# Patient Record
Sex: FEMALE | Race: WHITE | Employment: OTHER | URBAN - METROPOLITAN AREA
[De-identification: names, ages, dates, MRNs, and addresses within clinical notes are randomized per-mention and may not be internally consistent; named-entity substitution may affect disease eponyms.]

---

## 2018-10-01 ENCOUNTER — HOSPITAL ENCOUNTER (EMERGENCY)
Age: 28
Discharge: HOME | End: 2018-10-02
Attending: EMERGENCY MEDICINE
Payer: MEDICAID

## 2018-10-01 VITALS
RESPIRATION RATE: 15 BRPM | OXYGEN SATURATION: 98 % | TEMPERATURE: 98.7 F | DIASTOLIC BLOOD PRESSURE: 81 MMHG | HEART RATE: 92 BPM | BODY MASS INDEX: 42.68 KG/M2 | HEIGHT: 64 IN | WEIGHT: 250 LBS | SYSTOLIC BLOOD PRESSURE: 127 MMHG

## 2018-10-01 DIAGNOSIS — R45.851 DEPRESSION WITH SUICIDAL IDEATION: Primary | ICD-10-CM

## 2018-10-01 DIAGNOSIS — F32.A DEPRESSION WITH SUICIDAL IDEATION: Primary | ICD-10-CM

## 2018-10-01 LAB
ACETAMINOPHEN LEVEL: <5 UG/ML (ref 15–30)
ALBUMIN SERPL-MCNC: 3.8 GM/DL (ref 3.4–5)
ALCOHOL SCREEN SERUM: <0.01 %WT/VOL
ALP BLD-CCNC: 79 IU/L (ref 40–129)
ALT SERPL-CCNC: 15 U/L (ref 10–40)
AMPHETAMINES: NEGATIVE
ANION GAP SERPL CALCULATED.3IONS-SCNC: 11 MMOL/L (ref 4–16)
AST SERPL-CCNC: 16 IU/L (ref 15–37)
BACTERIA: NEGATIVE /HPF
BARBITURATE SCREEN URINE: NEGATIVE
BASOPHILS ABSOLUTE: 0 K/CU MM
BASOPHILS RELATIVE PERCENT: 0.3 % (ref 0–1)
BENZODIAZEPINE SCREEN, URINE: NEGATIVE
BILIRUB SERPL-MCNC: 0.2 MG/DL (ref 0–1)
BILIRUBIN URINE: NEGATIVE MG/DL
BLOOD, URINE: NEGATIVE
BUN BLDV-MCNC: 14 MG/DL (ref 6–23)
CALCIUM SERPL-MCNC: 9.2 MG/DL (ref 8.3–10.6)
CANNABINOID SCREEN URINE: NEGATIVE
CHLORIDE BLD-SCNC: 103 MMOL/L (ref 99–110)
CLARITY: CLEAR
CO2: 26 MMOL/L (ref 21–32)
COCAINE METABOLITE: NEGATIVE
COLOR: YELLOW
CREAT SERPL-MCNC: 0.5 MG/DL (ref 0.6–1.1)
DIFFERENTIAL TYPE: ABNORMAL
EOSINOPHILS ABSOLUTE: 0.2 K/CU MM
EOSINOPHILS RELATIVE PERCENT: 2.4 % (ref 0–3)
GFR AFRICAN AMERICAN: >60 ML/MIN/1.73M2
GFR NON-AFRICAN AMERICAN: >60 ML/MIN/1.73M2
GLUCOSE BLD-MCNC: 92 MG/DL (ref 70–99)
GLUCOSE, URINE: NEGATIVE MG/DL
HCT VFR BLD CALC: 39.2 % (ref 37–47)
HEMOGLOBIN: 12 GM/DL (ref 12.5–16)
IMMATURE NEUTROPHIL %: 0.2 % (ref 0–0.43)
KETONES, URINE: NEGATIVE MG/DL
LEUKOCYTE ESTERASE, URINE: NEGATIVE
LYMPHOCYTES ABSOLUTE: 2 K/CU MM
LYMPHOCYTES RELATIVE PERCENT: 20.2 % (ref 24–44)
MCH RBC QN AUTO: 26.4 PG (ref 27–31)
MCHC RBC AUTO-ENTMCNC: 30.6 % (ref 32–36)
MCV RBC AUTO: 86.2 FL (ref 78–100)
MONOCYTES ABSOLUTE: 0.6 K/CU MM
MONOCYTES RELATIVE PERCENT: 6.1 % (ref 0–4)
MUCUS: NORMAL HPF
NITRITE URINE, QUANTITATIVE: NEGATIVE
NUCLEATED RBC %: 0 %
OPIATES, URINE: NEGATIVE
OXYCODONE: NEGATIVE
PDW BLD-RTO: 13.8 % (ref 11.7–14.9)
PH, URINE: 5
PHENCYCLIDINE, URINE: NEGATIVE
PLATELET # BLD: 349 K/CU MM (ref 140–440)
PMV BLD AUTO: 9 FL (ref 7.5–11.1)
POTASSIUM SERPL-SCNC: 4.1 MMOL/L (ref 3.5–5.1)
PREGNANCY, URINE: NEGATIVE
PROTEIN UA: NEGATIVE MG/DL
RBC # BLD: 4.55 M/CU MM (ref 4.2–5.4)
RBC URINE: <1 /HPF
SALICYLATE LEVEL: <0.3 MG/DL (ref 15–30)
SEGMENTED NEUTROPHILS ABSOLUTE COUNT: 7 K/CU MM
SEGMENTED NEUTROPHILS RELATIVE PERCENT: 70.8 % (ref 36–66)
SODIUM BLD-SCNC: 140 MMOL/L (ref 135–145)
SPECIFIC GRAVITY UA: 1.02
SPECIFIC GRAVITY, URINE: 1.02 (ref 1–1.03)
SQUAMOUS EPITHELIAL: 2 /HPF
TOTAL IMMATURE NEUTOROPHIL: 0.02 K/CU MM
TOTAL NUCLEATED RBC: 0 K/CU MM
TOTAL PROTEIN: 7.6 GM/DL (ref 6.4–8.2)
TRICHOMONAS: NORMAL /HPF
TSH HIGH SENSITIVITY: 1.1 UIU/ML (ref 0.27–4.2)
UROBILINOGEN, URINE: 1 MG/DL
WBC # BLD: 9.8 K/CU MM (ref 4–10.5)
WBC UA: 2 /HPF

## 2018-10-01 PROCEDURE — 80307 DRUG TEST PRSMV CHEM ANLYZR: CPT

## 2018-10-01 PROCEDURE — G0480 DRUG TEST DEF 1-7 CLASSES: HCPCS

## 2018-10-01 PROCEDURE — 80329 ANALGESICS NON-OPIOID 1 OR 2: CPT

## 2018-10-01 PROCEDURE — 81025 URINE PREGNANCY TEST: CPT

## 2018-10-01 PROCEDURE — 36415 COLL VENOUS BLD VENIPUNCTURE: CPT

## 2018-10-01 PROCEDURE — 80050 GENERAL HEALTH PANEL: CPT

## 2018-10-01 PROCEDURE — 81001 URINALYSIS AUTO W/SCOPE: CPT

## 2018-10-01 PROCEDURE — 80320 DRUG SCREEN QUANTALCOHOLS: CPT

## 2018-10-01 PROCEDURE — 99285 EMERGENCY DEPT VISIT HI MDM: CPT

## 2018-10-01 NOTE — ED PROVIDER NOTES
EMERGENCY DEPARTMENT H&P     Patient Name:  Jeffy Walker  :  1990  MRN:  [id number]  Date of Visit:  10/1/2018    Triage Chief Complaint:   Suicide Attempt    HPI:  Jeffy Walker is a 25 y.o. female who presents for complaints of suicidal ideations. Patient reports that she has a history of PTSD and has been depressed she feels her family has left her and cares about her. She is present with her caretaker today who reports that patient had plans to stab herself in the neck with a fork however was stopped from doing so by staff. She states she no longer feels suicidal at this time however feels depressed. She denies any actual self-harm today. She denies any physical complaints. She denies any substance abuse. Denies any other symptoms or history at this time. ROS:  ROS  Ten point ROS was performed and is negative except as stated in HPI above. Past Medical History:   Diagnosis Date    Back pain     Depression     Encephalitis     Meningitis     Paraplegia ([facility] [address]     Suicidal ideation      Past Surgical History:   Procedure Laterality Date    PRE-MALIGNANT / BENIGN SKIN LESION EXCISION       left shoulder     History reviewed. No pertinent family history. Social History     Social History    Marital status: Single     Spouse name: N/A    Number of children: N/A    Years of education: N/A     Occupational History    Not on file. Social History Main Topics    Smoking status: Never Smoker    Smokeless tobacco: Never Used    Alcohol use No    Drug use: No    Sexual activity: No     Other Topics Concern    Not on file     Social History Narrative    Lives in group home. Wheelchair-bound since meningitis at 13 yo     No current facility-administered medications for this encounter.       Current Outpatient Prescriptions   Medication Sig Dispense Refill    magnesium citrate solution Take 296 mLs by mouth once for 1 dose 296 mL 0    diclofenac sodium (VOLTAREN) 1 % GEL Apply 4 g topically 4 times daily for 10 days 1 Tube 0    nystatin (MYCOSTATIN) 897876 UNIT/GM cream Apply topically 2 times daily Apply topically 2 times daily.  PARoxetine (PAXIL) 40 MG tablet Take 40 mg by mouth every morning      linaclotide (LINZESS) 145 MCG capsule Take 145 mcg by mouth every morning (before breakfast)      clotrimazole-betamethasone (LOTRISONE) 1-0.05 % cream Apply topically 2 times daily Apply topically 2 times daily.  zaleplon (SONATA) 5 MG capsule Take 10 mg by mouth nightly       bisacodyl (DULCOLAX) 5 MG EC tablet Take 5 mg by mouth every 12 hours as needed for Constipation.  benzoyl peroxide-erythromycin (BENZAMYCIN) gel Apply  topically 2 times daily. Apply topically 2 times daily. Allergies   Allergen Reactions    Fish-Derived Products     Lemon Extract [Flavoring Agent]        Physical Exam:  ED TRIAGE VITALS  BP: 127/81, Temp: 98.7 °F (37.1 °C), Pulse: 92, Resp: 15, SpO2: 98 %    GENERAL: Awake and alert, appears stated age, no acute distress noted. HEENT: NC / AT. Oropharynx unremarkable. NECK: Trachea midline. No overt adenopathy or masses. No stab wounds or other signs of trauma noted. CARDIOVASCULAR: RRR. Normal s1/s2. No murmur. Peripheral pulses and perfusion intact. RESPIRATORY: Lungs clear to auscultation bilaterally. No respiratory distress or accessory muscle usage. SKIN: Warm. Dry. Intact. No obvious skin abnormalities noted. NEURO: The patient is awake, alert, interactive. Follows commands and answers questions appropriately. Speech is fluent with intact cognition. PSYCHIATRIC: Appropriate. Cooperative with exam. Denies SI / HI at this time. Good eye contact.      I have reviewed and interpreted all of the currently available lab results from this visit:  Results for orders placed or performed during the hospital encounter of 10/01/18   CBC Auto Differential   Result Value Ref Range    WBC 9.8 4.0 - 10.5 K/CU MM    RBC 4.55 4.2

## 2018-10-01 NOTE — ED NOTES
All personal belongings were given to the representative that is with the patient as per the patients request       Lauren Smith RN  10/01/18 [id number]

## 2018-10-01 NOTE — ED NOTES
Patient placed in the bed at this time and changed into a green gown as per policy.       Chris Gary RN  10/01/18 [id number]

## 2018-10-02 NOTE — ED NOTES
Pt resting quietly in bed watching TV. Respirations easy. Sitter present.       [place], RN  10/01/18 1647
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
## 2019-02-11 ENCOUNTER — HOSPITAL ENCOUNTER (OUTPATIENT)
Dept: WOUND CARE | Age: 29
Discharge: HOME | End: 2019-02-11
Payer: MEDICAID

## 2019-02-11 ENCOUNTER — HOSPITAL ENCOUNTER (EMERGENCY)
Age: 29
Discharge: HOME | End: 2019-02-11
Attending: EMERGENCY MEDICINE
Payer: MEDICAID

## 2019-02-11 VITALS
RESPIRATION RATE: 16 BRPM | SYSTOLIC BLOOD PRESSURE: 119 MMHG | BODY MASS INDEX: 42.91 KG/M2 | DIASTOLIC BLOOD PRESSURE: 79 MMHG | HEIGHT: 64 IN | HEART RATE: 80 BPM | TEMPERATURE: 98.2 F

## 2019-02-11 VITALS
HEART RATE: 95 BPM | DIASTOLIC BLOOD PRESSURE: 71 MMHG | SYSTOLIC BLOOD PRESSURE: 106 MMHG | WEIGHT: 250 LBS | HEIGHT: 64 IN | BODY MASS INDEX: 42.68 KG/M2 | RESPIRATION RATE: 16 BRPM | OXYGEN SATURATION: 98 % | TEMPERATURE: 98.4 F

## 2019-02-11 DIAGNOSIS — F69 BEHAVIOR PROBLEM, ADULT: Primary | ICD-10-CM

## 2019-02-11 DIAGNOSIS — L89.311: ICD-10-CM

## 2019-02-11 DIAGNOSIS — L98.411 NON-PRESSURE CHRONIC ULCER OF BUTTOCK, LIMITED TO BREAKDOWN OF SKIN (HCC): ICD-10-CM

## 2019-02-11 LAB
ACETAMINOPHEN LEVEL: <5 UG/ML (ref 15–30)
ALBUMIN SERPL-MCNC: 3.3 GM/DL (ref 3.4–5)
ALCOHOL SCREEN SERUM: <0.01 %WT/VOL
ALP BLD-CCNC: 79 IU/L (ref 40–129)
ALT SERPL-CCNC: 10 U/L (ref 10–40)
AMPHETAMINES: NEGATIVE
ANION GAP SERPL CALCULATED.3IONS-SCNC: 12 MMOL/L (ref 4–16)
AST SERPL-CCNC: 12 IU/L (ref 15–37)
BARBITURATE SCREEN URINE: NEGATIVE
BASOPHILS ABSOLUTE: 0 K/CU MM
BASOPHILS RELATIVE PERCENT: 0.3 % (ref 0–1)
BENZODIAZEPINE SCREEN, URINE: NEGATIVE
BILIRUB SERPL-MCNC: 0.1 MG/DL (ref 0–1)
BUN BLDV-MCNC: 11 MG/DL (ref 6–23)
CALCIUM SERPL-MCNC: 8.6 MG/DL (ref 8.3–10.6)
CANNABINOID SCREEN URINE: NEGATIVE
CHLORIDE BLD-SCNC: 104 MMOL/L (ref 99–110)
CO2: 21 MMOL/L (ref 21–32)
COCAINE METABOLITE: NEGATIVE
CREAT SERPL-MCNC: 0.4 MG/DL (ref 0.6–1.1)
DIFFERENTIAL TYPE: ABNORMAL
EOSINOPHILS ABSOLUTE: 0.3 K/CU MM
EOSINOPHILS RELATIVE PERCENT: 3 % (ref 0–3)
GFR AFRICAN AMERICAN: >60 ML/MIN/1.73M2
GFR NON-AFRICAN AMERICAN: >60 ML/MIN/1.73M2
GLUCOSE BLD-MCNC: 109 MG/DL (ref 70–99)
GLUCOSE BLD-MCNC: 137 MG/DL (ref 70–99)
HCG QUALITATIVE: NEGATIVE
HCT VFR BLD CALC: 34.7 % (ref 37–47)
HEMOGLOBIN: 10.8 GM/DL (ref 12.5–16)
IMMATURE NEUTROPHIL %: 0.4 % (ref 0–0.43)
LYMPHOCYTES ABSOLUTE: 2.2 K/CU MM
LYMPHOCYTES RELATIVE PERCENT: 20.5 % (ref 24–44)
MCH RBC QN AUTO: 26.6 PG (ref 27–31)
MCHC RBC AUTO-ENTMCNC: 31.1 % (ref 32–36)
MCV RBC AUTO: 85.5 FL (ref 78–100)
MONOCYTES ABSOLUTE: 0.6 K/CU MM
MONOCYTES RELATIVE PERCENT: 5.3 % (ref 0–4)
NUCLEATED RBC %: 0 %
OPIATES, URINE: NEGATIVE
OXYCODONE: NEGATIVE
PDW BLD-RTO: 13.7 % (ref 11.7–14.9)
PHENCYCLIDINE, URINE: NEGATIVE
PLATELET # BLD: 352 K/CU MM (ref 140–440)
PMV BLD AUTO: 8.9 FL (ref 7.5–11.1)
POTASSIUM SERPL-SCNC: 3.9 MMOL/L (ref 3.5–5.1)
RBC # BLD: 4.06 M/CU MM (ref 4.2–5.4)
SALICYLATE LEVEL: <0.3 MG/DL (ref 15–30)
SEGMENTED NEUTROPHILS ABSOLUTE COUNT: 7.4 K/CU MM
SEGMENTED NEUTROPHILS RELATIVE PERCENT: 70.5 % (ref 36–66)
SODIUM BLD-SCNC: 137 MMOL/L (ref 135–145)
TOTAL IMMATURE NEUTOROPHIL: 0.04 K/CU MM
TOTAL NUCLEATED RBC: 0 K/CU MM
TOTAL PROTEIN: 6.9 GM/DL (ref 6.4–8.2)
WBC # BLD: 10.5 K/CU MM (ref 4–10.5)

## 2019-02-11 PROCEDURE — 85025 COMPLETE CBC W/AUTO DIFF WBC: CPT

## 2019-02-11 PROCEDURE — 99284 EMERGENCY DEPT VISIT MOD MDM: CPT

## 2019-02-11 PROCEDURE — 82962 GLUCOSE BLOOD TEST: CPT

## 2019-02-11 PROCEDURE — 80053 COMPREHEN METABOLIC PANEL: CPT

## 2019-02-11 PROCEDURE — 80307 DRUG TEST PRSMV CHEM ANLYZR: CPT

## 2019-02-11 PROCEDURE — 84703 CHORIONIC GONADOTROPIN ASSAY: CPT

## 2019-02-11 PROCEDURE — 99213 OFFICE O/P EST LOW 20 MIN: CPT

## 2019-02-11 PROCEDURE — G0480 DRUG TEST DEF 1-7 CLASSES: HCPCS

## 2019-02-11 PROCEDURE — 36415 COLL VENOUS BLD VENIPUNCTURE: CPT

## 2019-02-11 RX ORDER — AMOXICILLIN 500 MG/1
500 CAPSULE ORAL 3 TIMES DAILY
COMMUNITY
End: 2019-08-15

## 2019-02-11 RX ORDER — CIPROFLOXACIN AND DEXAMETHASONE 3; 1 MG/ML; MG/ML
4 SUSPENSION/ DROPS AURICULAR (OTIC) 2 TIMES DAILY
COMMUNITY
End: 2019-08-15

## 2019-02-11 RX ORDER — IBUPROFEN 800 MG/1
800 TABLET ORAL EVERY 6 HOURS PRN
COMMUNITY
End: 2019-10-01 | Stop reason: SDUPTHER

## 2019-02-25 ENCOUNTER — HOSPITAL ENCOUNTER (OUTPATIENT)
Dept: WOUND CARE | Age: 29
Discharge: HOME | End: 2019-02-25

## 2019-02-25 ENCOUNTER — HOSPITAL ENCOUNTER (EMERGENCY)
Age: 29
Discharge: OTHER INSTITUTION | End: 2019-02-25
Attending: EMERGENCY MEDICINE
Payer: MEDICAID

## 2019-02-25 VITALS
HEART RATE: 104 BPM | OXYGEN SATURATION: 99 % | BODY MASS INDEX: 42.68 KG/M2 | DIASTOLIC BLOOD PRESSURE: 80 MMHG | HEIGHT: 64 IN | RESPIRATION RATE: 18 BRPM | TEMPERATURE: 98 F | WEIGHT: 250 LBS | SYSTOLIC BLOOD PRESSURE: 101 MMHG

## 2019-02-25 DIAGNOSIS — R46.89 BEHAVIORAL CHANGE: Primary | ICD-10-CM

## 2019-02-25 PROCEDURE — 99285 EMERGENCY DEPT VISIT HI MDM: CPT

## 2019-05-31 ENCOUNTER — APPOINTMENT (OUTPATIENT)
Dept: GENERAL RADIOLOGY | Age: 29
End: 2019-05-31
Payer: MEDICAID

## 2019-05-31 ENCOUNTER — HOSPITAL ENCOUNTER (EMERGENCY)
Age: 29
Discharge: HOME | End: 2019-05-31
Payer: MEDICAID

## 2019-05-31 VITALS
TEMPERATURE: 98.4 F | HEART RATE: 96 BPM | SYSTOLIC BLOOD PRESSURE: 91 MMHG | RESPIRATION RATE: 18 BRPM | WEIGHT: 230 LBS | DIASTOLIC BLOOD PRESSURE: 77 MMHG | BODY MASS INDEX: 39.27 KG/M2 | OXYGEN SATURATION: 94 % | HEIGHT: 64 IN

## 2019-05-31 DIAGNOSIS — S90.32XA CONTUSION OF LEFT FOOT, INITIAL ENCOUNTER: Primary | ICD-10-CM

## 2019-05-31 PROCEDURE — 99283 EMERGENCY DEPT VISIT LOW MDM: CPT

## 2019-05-31 PROCEDURE — 73630 X-RAY EXAM OF FOOT: CPT

## 2019-05-31 PROCEDURE — 73610 X-RAY EXAM OF ANKLE: CPT

## 2019-05-31 ASSESSMENT — PAIN DESCRIPTION - LOCATION: LOCATION: FOOT

## 2019-05-31 ASSESSMENT — PAIN DESCRIPTION - ORIENTATION: ORIENTATION: LEFT

## 2019-05-31 ASSESSMENT — PAIN DESCRIPTION - PAIN TYPE: TYPE: ACUTE PAIN

## 2019-05-31 NOTE — ED NOTES
Bed: ED-25  Expected date:   Expected time:   Means of arrival:   Comments:  25 F lt foot injury     Ebonie Hilliard RN  05/31/19 [id number]

## 2019-05-31 NOTE — ED PROVIDER NOTES
eMERGENCY dEPARTMENT eNCOUnter      PCP: Morris Hodgkins, APRN - CNP    CHIEF COMPLAINT    Chief Complaint   Patient presents with    Foot Injury         HPI    Va Pelletier is a 25 y.o. female who presents with pain localized in the Left foot. Onset is Tuesday, 3 days ago. The context was patient is paraplegic and is in a wheelchair. She was at work when her wheelchair-bound due to some scaffolding causing a 5 gallon paint started to fall down onto her foot. No other injuries. Patient having gradually increasing pain and therefore presents to the ED for further evaluation. REVIEW OF SYSTEMS    General: No fever or chills  Skin: No lacerations or puncture wounds  Musculoskeletal: No other joint pain    All other review of systems are negative  See HPI and nursing notes for additional information       PAST MEDICAL & SURGICAL HISTORY    Past Medical History:   Diagnosis Date    Back pain     Depression     Encephalitis     Meningitis     Paraplegia ([address]     Suicidal ideation      Past Surgical History:   Procedure Laterality Date    PRE-MALIGNANT / BENIGN SKIN LESION EXCISION      2015 left shoulder       CURRENT MEDICATIONS    Current Outpatient Rx   Medication Sig Dispense Refill    amoxicillin (AMOXIL) 500 MG capsule Take 500 mg by mouth 3 times daily      ciprofloxacin-dexamethasone (CIPRODEX) 0.3-0.1 % otic suspension 4 drops 2 times daily      ibuprofen (ADVIL;MOTRIN) 800 MG tablet Take 800 mg by mouth every 6 hours as needed for Pain      magnesium citrate solution Take 296 mLs by mouth once for 1 dose 296 mL 0    diclofenac sodium (VOLTAREN) 1 % GEL Apply 4 g topically 4 times daily for 10 days 1 Tube 0    nystatin (MYCOSTATIN) 073401 UNIT/GM cream Apply topically 2 times daily Apply topically 2 times daily.       PARoxetine (PAXIL) 40 MG tablet Take 40 mg by mouth every morning      linaclotide (LINZESS) 145 MCG capsule Take 145 mcg by mouth every morning (before breakfast)  clotrimazole-betamethasone (LOTRISONE) 1-0.05 % cream Apply topically 2 times daily Apply topically 2 times daily.  zaleplon (SONATA) 5 MG capsule Take 10 mg by mouth nightly       bisacodyl (DULCOLAX) 5 MG EC tablet Take 5 mg by mouth every 12 hours as needed for Constipation.  benzoyl peroxide-erythromycin (BENZAMYCIN) gel Apply  topically 2 times daily. Apply topically 2 times daily. ALLERGIES    Allergies   Allergen Reactions    Fish-Derived Products     Lemon Extract [Flavoring Agent]        SOCIAL & FAMILY HISTORY    Social History     Socioeconomic History    Marital status: Single     Spouse name: None    Number of children: None    Years of education: None    Highest education level: None   Occupational History    None   Social Needs    Financial resource strain: None    Food insecurity:     Worry: None     Inability: None    Transportation needs:     Medical: None     Non-medical: None   Tobacco Use    Smoking status: Never Smoker    Smokeless tobacco: Never Used   Substance and Sexual Activity    Alcohol use: No    Drug use: No    Sexual activity: Never   Lifestyle    Physical activity:     Days per week: None     Minutes per session: None    Stress: None   Relationships    Social connections:     Talks on phone: None     Gets together: None     Attends Christianity service: None     Active member of club or organization: None     Attends meetings of clubs or organizations: None     Relationship status: None    Intimate partner violence:     Fear of current or ex partner: None     Emotionally abused: None     Physically abused: None     Forced sexual activity: None   Other Topics Concern    None   Social History Narrative    Lives in group home. Wheelchair-bound since meningitis at 13 yo     History reviewed. No pertinent family history.       PHYSICAL EXAM    VITAL SIGNS: BP 91/77   Pulse 96   Temp 98.4 °F (36.9 °C) (Oral)   Resp 18   Ht 5' 4\" (1.626 m) Wt 230 lb (104.3 kg)   SpO2 94%   BMI 39.48 kg/m²   Constitutional:  Well developed, appears comfortable  HENT:  Atraumatic  Respiratory:  No retractions  Musculoskeletal:   Left Lower Extemity:  The Left foot demonstrates mild soft tissue swelling. There is tenderness over dorsal aspect. No palpable defects. Range of motion mildly limited - no obvious deficits. The ankle joint is stable. No swelling, discoloration, or tenderness to palpation of the proximal to distal lower leg bones, ankle & toes  Distal capillary refill, motor intact. Vascular:  DP pulse 2+, capillary refill intact. Integument:  No open wounds of the left ankle   Neurologic:  Awake alert, no slurred speech     RADIOLOGY   Left foot xrays:       XR FOOT LEFT (MIN 3 VIEWS) (Final result)   Result time 05/31/19 01:21:47   Final result by Lulú Alfonso MD (05/31/19 01:21:47)                Impression:    1. Severe osteopenia, which limits evaluation. 2. No convincing evidence of a displaced fracture of the left ankle or foot. 3. Diffuse soft tissue prominence. Narrative:    EXAMINATION:  3 XRAY VIEWS OF THE LEFT FOOT; 3 XRAY VIEWS OF THE LEFT ANKLE    5/31/2019 12:38 am    COMPARISON:  04/18/2016. HISTORY:  ORDERING SYSTEM PROVIDED HISTORY: injury  TECHNOLOGIST PROVIDED HISTORY:  Reason for exam:->injury  Ordering Physician Provided Reason for Exam: injury, pt states that an 80lb  can of paint fell on foot and ankle  Acuity: Acute  Type of Exam: Initial  Relevant Medical/Surgical History: Best possible images, pt is paraplegic    FINDINGS:  The osseous structures are severely demineralized, which limits evaluation. No evidence of an acutely displaced fracture of the left ankle or left foot.   There is accentuation of the normal plantar arch.  There is diffuse soft  tissue swelling.                      XR ANKLE LEFT (MIN 3 VIEWS) (Final result)   Result time 05/31/19 01:21:47   Final result by Lulú Alfonso MD (05/31/19 01:21:47)                Impression:    1. Severe osteopenia, which limits evaluation. 2. No convincing evidence of a displaced fracture of the left ankle or foot. 3. Diffuse soft tissue prominence. Narrative:    EXAMINATION:  3 XRAY VIEWS OF THE LEFT FOOT; 3 XRAY VIEWS OF THE LEFT ANKLE    5/31/2019 12:38 am    COMPARISON:  04/18/2016. HISTORY:  ORDERING SYSTEM PROVIDED HISTORY: injury  TECHNOLOGIST PROVIDED HISTORY:  Reason for exam:->injury  Ordering Physician Provided Reason for Exam: injury, pt states that an 80lb  can of paint fell on foot and ankle  Acuity: Acute  Type of Exam: Initial  Relevant Medical/Surgical History: Best possible images, pt is paraplegic    FINDINGS:  The osseous structures are severely demineralized, which limits evaluation. No evidence of an acutely displaced fracture of the left ankle or left foot. There is accentuation of the normal plantar arch.  There is diffuse soft  tissue swelling.                      ED COURSE & MEDICAL DECISION MAKING       Vital signs and nursing notes reviewed during ED course. I have independently evaluated this patient . Supervising MD present in the Emergency Department, available for consultation, throughout entirety of  patient care. All pertinent Lab data and radiographic results reviewed with patient at bedside. The patient and/or the family were informed of the results of any tests/labs/imaging, the treatment plan, and time was allotted to answer questions. Differential diagnosis includes but not limited to fracture, dislocation, vascular injury, neurologic injury. Clinical  IMPRESSION    1. Contusion of left foot, initial encounter       Recommend ice and elevation as much as possible. To followup with orthopedist if symptoms do not improve over the next 5-7days. Diagnosis and plan discussed in detail with patient who understands and agrees.   Patient agrees to return emergency department if symptoms worsen or any new symptoms develop. Comment: Please note this report has been produced using speech recognition software and may contain errors related to that system including errors in grammar, punctuation, and spelling, as well as words and phrases that may be inappropriate. If there are any questions or concerns please feel free to contact the dictating provider for clarification.           Marianne Cassidy PA-C  05/31/19 [id number]

## 2019-06-17 ENCOUNTER — HOSPITAL ENCOUNTER (OUTPATIENT)
Dept: OCCUPATIONAL THERAPY | Age: 29
Discharge: HOME | End: 2019-06-17

## 2019-06-17 NOTE — FLOWSHEET NOTE
Physical Therapy  Cancellation/No-show Note  Patient Name:  Oleg Rich  :  1990   Date:  2019  Cancelled visits to date: 1  No-shows to date: 0    For today's appointment patient:  []  Cancelled  []  Rescheduled appointment  []  No-show     Reason given by patient:  []  Patient ill  []  Conflicting appointment  []  No transportation    []  Conflict with work  []  No reason given  [x]  Other:     Comments:  Patient wants home health care  Electronically signed by:  Kelly Mcgill, 2019, 12:15 PM

## 2019-08-15 ENCOUNTER — OFFICE VISIT (OUTPATIENT)
Dept: FAMILY MEDICINE CLINIC | Age: 29
End: 2019-08-15
Payer: MEDICAID

## 2019-08-15 VITALS
HEART RATE: 76 BPM | HEIGHT: 64 IN | BODY MASS INDEX: 39.27 KG/M2 | WEIGHT: 230 LBS | DIASTOLIC BLOOD PRESSURE: 74 MMHG | SYSTOLIC BLOOD PRESSURE: 110 MMHG | OXYGEN SATURATION: 98 %

## 2019-08-15 DIAGNOSIS — F41.9 ANXIETY: ICD-10-CM

## 2019-08-15 DIAGNOSIS — Z76.89 ENCOUNTER TO ESTABLISH CARE: ICD-10-CM

## 2019-08-15 DIAGNOSIS — R53.83 FATIGUE, UNSPECIFIED TYPE: ICD-10-CM

## 2019-08-15 DIAGNOSIS — R15.9 INCONTINENCE OF FECES, UNSPECIFIED FECAL INCONTINENCE TYPE: Primary | ICD-10-CM

## 2019-08-15 PROCEDURE — 99202 OFFICE O/P NEW SF 15 MIN: CPT | Performed by: NURSE PRACTITIONER

## 2019-08-15 RX ORDER — LORATADINE 10 MG/1
10 TABLET ORAL DAILY
Qty: 30 TABLET | Refills: 0 | Status: SHIPPED | OUTPATIENT
Start: 2019-08-15 | End: 2019-08-16 | Stop reason: SDUPTHER

## 2019-08-15 RX ORDER — DIAPER,BRIEF,INFANT-TODD,DISP
EACH MISCELLANEOUS
Qty: 56 G | Refills: 0 | Status: SHIPPED | OUTPATIENT
Start: 2019-08-15 | End: 2019-08-16 | Stop reason: SDUPTHER

## 2019-08-15 NOTE — PROGRESS NOTES
Eyes: Negative. Respiratory: Negative for cough, chest tightness, shortness of breath and wheezing. Cardiovascular: Negative for chest pain and palpitations. Gastrointestinal: Negative. Negative for nausea. Endocrine: Negative. Genitourinary: Negative. Musculoskeletal: Positive for gait problem (paraplegic). Negative for arthralgias, back pain, joint swelling, myalgias, neck pain and neck stiffness. Neurological: Negative for dizziness, seizures and headaches. Hematological: Negative. Psychiatric/Behavioral: Negative. OBJECTIVE:     /74   Pulse 76   Ht 5' 4\" (1.626 m)   Wt 230 lb (104.3 kg)   SpO2 98%   BMI 39.48 kg/m²     Physical Exam   Constitutional: She is oriented to person, place, and time. She appears well-developed and well-nourished. No distress. HENT:   Head: Normocephalic and atraumatic. Right Ear: External ear normal.   Left Ear: External ear normal.   Nose: Nose normal.   Mouth/Throat: Oropharynx is clear and moist. No oropharyngeal exudate. Eyes: Pupils are equal, round, and reactive to light. Conjunctivae and EOM are normal. Right eye exhibits no discharge. Left eye exhibits no discharge. No scleral icterus. Neck: Normal range of motion. Neck supple. Cardiovascular: Normal rate, regular rhythm and normal heart sounds. Pulses:       Dorsalis pedis pulses are 2+ on the right side, and 2+ on the left side. Pulmonary/Chest: Effort normal and breath sounds normal. No respiratory distress. She has no wheezes. She exhibits no tenderness. Abdominal: Soft. Musculoskeletal: Normal range of motion. Feet:   Right Foot:   Skin Integrity: Negative for ulcer or skin breakdown. Left Foot:   Skin Integrity: Negative for ulcer or skin breakdown. Lymphadenopathy:     She has no cervical adenopathy. Neurological: She is alert and oriented to person, place, and time. Skin: Skin is warm and dry. She is not diaphoretic.    Psychiatric: She has a normal

## 2019-08-16 DIAGNOSIS — R15.9 INCONTINENCE OF FECES, UNSPECIFIED FECAL INCONTINENCE TYPE: ICD-10-CM

## 2019-08-16 RX ORDER — LORATADINE 10 MG/1
10 TABLET ORAL DAILY
Qty: 30 TABLET | Refills: 0 | Status: SHIPPED | OUTPATIENT
Start: 2019-08-16 | End: 2019-09-15

## 2019-08-16 RX ORDER — DIAPER,BRIEF,INFANT-TODD,DISP
EACH MISCELLANEOUS
Qty: 56 G | Refills: 0 | Status: SHIPPED | OUTPATIENT
Start: 2019-08-16 | End: 2019-08-29 | Stop reason: ALTCHOICE

## 2019-08-16 ASSESSMENT — ENCOUNTER SYMPTOMS
BACK PAIN: 0
CHEST TIGHTNESS: 0
SINUS PRESSURE: 0
GASTROINTESTINAL NEGATIVE: 1
SORE THROAT: 1
SHORTNESS OF BREATH: 0
SINUS PAIN: 0
TROUBLE SWALLOWING: 0
EYES NEGATIVE: 1
WHEEZING: 0
COUGH: 0
NAUSEA: 0

## 2019-08-24 ENCOUNTER — HOSPITAL ENCOUNTER (EMERGENCY)
Age: 29
Discharge: HOME | End: 2019-08-24
Payer: MEDICAID

## 2019-08-24 ENCOUNTER — APPOINTMENT (OUTPATIENT)
Dept: GENERAL RADIOLOGY | Age: 29
End: 2019-08-24
Payer: MEDICAID

## 2019-08-24 VITALS
BODY MASS INDEX: 39.27 KG/M2 | OXYGEN SATURATION: 99 % | DIASTOLIC BLOOD PRESSURE: 82 MMHG | TEMPERATURE: 98.6 F | HEIGHT: 64 IN | WEIGHT: 230 LBS | SYSTOLIC BLOOD PRESSURE: 115 MMHG | HEART RATE: 97 BPM | RESPIRATION RATE: 15 BRPM

## 2019-08-24 DIAGNOSIS — Z86.59 HISTORY OF ANXIETY: Primary | ICD-10-CM

## 2019-08-24 LAB
ALBUMIN SERPL-MCNC: 3.6 GM/DL (ref 3.4–5)
ALP BLD-CCNC: 84 IU/L (ref 40–129)
ALT SERPL-CCNC: 15 U/L (ref 10–40)
ANION GAP SERPL CALCULATED.3IONS-SCNC: 10 MMOL/L (ref 4–16)
AST SERPL-CCNC: 20 IU/L (ref 15–37)
BASOPHILS ABSOLUTE: 0 K/CU MM
BASOPHILS RELATIVE PERCENT: 0.4 % (ref 0–1)
BILIRUB SERPL-MCNC: 0.2 MG/DL (ref 0–1)
BUN BLDV-MCNC: 12 MG/DL (ref 6–23)
CALCIUM SERPL-MCNC: 8.9 MG/DL (ref 8.3–10.6)
CHLORIDE BLD-SCNC: 105 MMOL/L (ref 99–110)
CO2: 25 MMOL/L (ref 21–32)
CREAT SERPL-MCNC: 0.5 MG/DL (ref 0.6–1.1)
DIFFERENTIAL TYPE: ABNORMAL
EOSINOPHILS ABSOLUTE: 0.3 K/CU MM
EOSINOPHILS RELATIVE PERCENT: 2.9 % (ref 0–3)
GFR AFRICAN AMERICAN: >60 ML/MIN/1.73M2
GFR NON-AFRICAN AMERICAN: >60 ML/MIN/1.73M2
GLUCOSE BLD-MCNC: 105 MG/DL (ref 70–99)
HCT VFR BLD CALC: 38.3 % (ref 37–47)
HEMOGLOBIN: 10.9 GM/DL (ref 12.5–16)
IMMATURE NEUTROPHIL %: 0.6 % (ref 0–0.43)
LYMPHOCYTES ABSOLUTE: 2.1 K/CU MM
LYMPHOCYTES RELATIVE PERCENT: 18.9 % (ref 24–44)
MCH RBC QN AUTO: 24.4 PG (ref 27–31)
MCHC RBC AUTO-ENTMCNC: 28.5 % (ref 32–36)
MCV RBC AUTO: 85.9 FL (ref 78–100)
MONOCYTES ABSOLUTE: 0.6 K/CU MM
MONOCYTES RELATIVE PERCENT: 5.2 % (ref 0–4)
NUCLEATED RBC %: 0 %
PDW BLD-RTO: 14.6 % (ref 11.7–14.9)
PLATELET # BLD: 403 K/CU MM (ref 140–440)
PMV BLD AUTO: 9.1 FL (ref 7.5–11.1)
POTASSIUM SERPL-SCNC: 4.3 MMOL/L (ref 3.5–5.1)
RBC # BLD: 4.46 M/CU MM (ref 4.2–5.4)
SEGMENTED NEUTROPHILS ABSOLUTE COUNT: 7.9 K/CU MM
SEGMENTED NEUTROPHILS RELATIVE PERCENT: 72 % (ref 36–66)
SODIUM BLD-SCNC: 140 MMOL/L (ref 135–145)
TOTAL IMMATURE NEUTOROPHIL: 0.06 K/CU MM
TOTAL NUCLEATED RBC: 0 K/CU MM
TOTAL PROTEIN: 7.5 GM/DL (ref 6.4–8.2)
WBC # BLD: 10.9 K/CU MM (ref 4–10.5)

## 2019-08-24 PROCEDURE — 36415 COLL VENOUS BLD VENIPUNCTURE: CPT

## 2019-08-24 PROCEDURE — 80053 COMPREHEN METABOLIC PANEL: CPT

## 2019-08-24 PROCEDURE — 71046 X-RAY EXAM CHEST 2 VIEWS: CPT

## 2019-08-24 PROCEDURE — 85025 COMPLETE CBC W/AUTO DIFF WBC: CPT

## 2019-08-24 PROCEDURE — 99284 EMERGENCY DEPT VISIT MOD MDM: CPT

## 2019-08-24 RX ORDER — HYDROXYZINE PAMOATE 25 MG/1
25-50 CAPSULE ORAL 3 TIMES DAILY PRN
Qty: 30 CAPSULE | Refills: 0 | Status: SHIPPED | OUTPATIENT
Start: 2019-08-24 | End: 2019-09-07

## 2019-08-24 RX ORDER — HYDROXYZINE HYDROCHLORIDE 50 MG/ML
50 INJECTION, SOLUTION INTRAMUSCULAR ONCE
Status: DISCONTINUED | OUTPATIENT
Start: 2019-08-24 | End: 2019-08-24 | Stop reason: HOSPADM

## 2019-08-24 ASSESSMENT — ENCOUNTER SYMPTOMS
CHEST TIGHTNESS: 0
DIARRHEA: 0
SHORTNESS OF BREATH: 0
VOMITING: 0
ABDOMINAL PAIN: 0
NAUSEA: 0

## 2019-08-24 ASSESSMENT — PAIN SCALES - GENERAL: PAINLEVEL_OUTOF10: 3

## 2019-08-24 ASSESSMENT — PAIN DESCRIPTION - LOCATION: LOCATION: CHEST

## 2019-08-24 ASSESSMENT — PAIN DESCRIPTION - DESCRIPTORS: DESCRIPTORS: PATIENT UNABLE TO DESCRIBE

## 2019-08-24 ASSESSMENT — PAIN DESCRIPTION - PAIN TYPE: TYPE: ACUTE PAIN

## 2019-08-24 NOTE — ED NOTES
Bed: ED-22  Expected date:   Expected time:   Means of arrival:   Comments:  Medic panic attack     Elizabeth Wright RN  08/24/19 [id number]

## 2019-08-24 NOTE — ED PROVIDER NOTES
session: None    Stress: None   Relationships    Social connections:     Talks on phone: None     Gets together: None     Attends Quaker service: None     Active member of club or organization: None     Attends meetings of clubs or organizations: None     Relationship status: None    Intimate partner violence:     Fear of current or ex partner: None     Emotionally abused: None     Physically abused: None     Forced sexual activity: None   Other Topics Concern    None   Social History Narrative    Lives in group home. Wheelchair-bound since meningitis at 13 yo       SCREENINGS             PHYSICAL EXAM    (up to 7 for level 4, 8 or more for level 5)     ED Triage Vitals   BP Temp Temp Source Pulse Resp SpO2 Height Weight   08/24/19 1222 08/24/19 1222 08/24/19 1222 08/24/19 1222 08/24/19 1222 08/24/19 1222 08/24/19 1220 08/24/19 1220   112/62 98.6 °F (37 °C) Oral 99 16 93 % 5' 4\" (1.626 m) 230 lb (104.3 kg)       Physical Exam   Constitutional: She is oriented to person, place, and time. She appears well-developed and well-nourished. No distress. HENT:   Head: Normocephalic and atraumatic. Right Ear: External ear normal.   Left Ear: External ear normal.   Eyes: Right eye exhibits no discharge. Left eye exhibits no discharge. Neck: Normal range of motion. Neck supple. No JVD present. Cardiovascular: Normal rate and regular rhythm. Exam reveals no friction rub. No murmur heard. Pulmonary/Chest: Effort normal and breath sounds normal. No stridor. No respiratory distress. She has no wheezes. Abdominal: Soft. She exhibits no mass. There is no tenderness. Musculoskeletal: Normal range of motion. Neurological: She is alert and oriented to person, place, and time. No cranial nerve deficit. GCS eye subscore is 4. GCS verbal subscore is 5. GCS motor subscore is 6. Skin: Skin is warm and dry. She is not diaphoretic. No pallor. Psychiatric: She has a normal mood and affect.  Her speech is normal and

## 2019-08-29 ENCOUNTER — OFFICE VISIT (OUTPATIENT)
Dept: FAMILY MEDICINE CLINIC | Age: 29
End: 2019-08-29
Payer: MEDICAID

## 2019-08-29 VITALS
SYSTOLIC BLOOD PRESSURE: 122 MMHG | OXYGEN SATURATION: 97 % | BODY MASS INDEX: 39.48 KG/M2 | HEART RATE: 94 BPM | DIASTOLIC BLOOD PRESSURE: 80 MMHG | WEIGHT: 230 LBS

## 2019-08-29 DIAGNOSIS — F41.9 ANXIETY: ICD-10-CM

## 2019-08-29 DIAGNOSIS — R41.82 ALTERED MENTAL STATUS, UNSPECIFIED ALTERED MENTAL STATUS TYPE: Primary | ICD-10-CM

## 2019-08-29 DIAGNOSIS — Z86.61 HISTORY OF ENCEPHALITIS: ICD-10-CM

## 2019-08-29 DIAGNOSIS — R15.9 INCONTINENCE OF FECES, UNSPECIFIED FECAL INCONTINENCE TYPE: ICD-10-CM

## 2019-08-29 DIAGNOSIS — R53.83 FATIGUE, UNSPECIFIED TYPE: ICD-10-CM

## 2019-08-29 DIAGNOSIS — F41.0 PANIC ATTACK: ICD-10-CM

## 2019-08-29 LAB
BASOPHILS ABSOLUTE: 0 K/UL (ref 0–0.2)
BASOPHILS RELATIVE PERCENT: 0.5 %
EOSINOPHILS ABSOLUTE: 0.4 K/UL (ref 0–0.6)
EOSINOPHILS RELATIVE PERCENT: 4.1 %
HCT VFR BLD CALC: 36.6 % (ref 36–48)
HEMOGLOBIN: 11.7 G/DL (ref 12–16)
LYMPHOCYTES ABSOLUTE: 2.3 K/UL (ref 1–5.1)
LYMPHOCYTES RELATIVE PERCENT: 21.6 %
MCH RBC QN AUTO: 25.4 PG (ref 26–34)
MCHC RBC AUTO-ENTMCNC: 32 G/DL (ref 31–36)
MCV RBC AUTO: 79.2 FL (ref 80–100)
MONOCYTES ABSOLUTE: 0.6 K/UL (ref 0–1.3)
MONOCYTES RELATIVE PERCENT: 6.1 %
NEUTROPHILS ABSOLUTE: 7.2 K/UL (ref 1.7–7.7)
NEUTROPHILS RELATIVE PERCENT: 67.7 %
PDW BLD-RTO: 16 % (ref 12.4–15.4)
PLATELET # BLD: 367 K/UL (ref 135–450)
PMV BLD AUTO: 7.1 FL (ref 5–10.5)
RBC # BLD: 4.63 M/UL (ref 4–5.2)
WBC # BLD: 10.6 K/UL (ref 4–11)

## 2019-08-29 PROCEDURE — 36415 COLL VENOUS BLD VENIPUNCTURE: CPT | Performed by: NURSE PRACTITIONER

## 2019-08-29 PROCEDURE — 99213 OFFICE O/P EST LOW 20 MIN: CPT | Performed by: NURSE PRACTITIONER

## 2019-08-29 ASSESSMENT — ENCOUNTER SYMPTOMS
SORE THROAT: 0
WHEEZING: 0
NAUSEA: 0
CHEST TIGHTNESS: 0
COUGH: 0
SHORTNESS OF BREATH: 0
ABDOMINAL PAIN: 0
VISUAL CHANGE: 0

## 2019-08-30 ENCOUNTER — TELEPHONE (OUTPATIENT)
Dept: FAMILY MEDICINE CLINIC | Age: 29
End: 2019-08-30

## 2019-08-30 DIAGNOSIS — G82.20 PARAPLEGIA (HCC): Primary | ICD-10-CM

## 2019-08-30 LAB
A/G RATIO: 1.4 (ref 1.1–2.2)
ALBUMIN SERPL-MCNC: 4.2 G/DL (ref 3.4–5)
ALP BLD-CCNC: 87 U/L (ref 40–129)
ALT SERPL-CCNC: 15 U/L (ref 10–40)
ANION GAP SERPL CALCULATED.3IONS-SCNC: 14 MMOL/L (ref 3–16)
AST SERPL-CCNC: 19 U/L (ref 15–37)
BILIRUB SERPL-MCNC: <0.2 MG/DL (ref 0–1)
BUN BLDV-MCNC: 11 MG/DL (ref 7–20)
CALCIUM SERPL-MCNC: 9.2 MG/DL (ref 8.3–10.6)
CHLORIDE BLD-SCNC: 101 MMOL/L (ref 99–110)
CHOLESTEROL, TOTAL: 155 MG/DL (ref 0–199)
CO2: 25 MMOL/L (ref 21–32)
CREAT SERPL-MCNC: <0.5 MG/DL (ref 0.6–1.1)
GFR AFRICAN AMERICAN: >60
GFR NON-AFRICAN AMERICAN: >60
GLOBULIN: 3.1 G/DL
GLUCOSE BLD-MCNC: 94 MG/DL (ref 70–99)
HDLC SERPL-MCNC: 32 MG/DL (ref 40–60)
LDL CHOLESTEROL CALCULATED: 97 MG/DL
POTASSIUM SERPL-SCNC: 4.1 MMOL/L (ref 3.5–5.1)
SODIUM BLD-SCNC: 140 MMOL/L (ref 136–145)
TOTAL PROTEIN: 7.3 G/DL (ref 6.4–8.2)
TRIGL SERPL-MCNC: 129 MG/DL (ref 0–150)
TSH SERPL DL<=0.05 MIU/L-ACNC: 1.59 UIU/ML (ref 0.27–4.2)
VLDLC SERPL CALC-MCNC: 26 MG/DL

## 2019-10-01 ENCOUNTER — OFFICE VISIT (OUTPATIENT)
Dept: FAMILY MEDICINE CLINIC | Age: 29
End: 2019-10-01
Payer: MEDICAID

## 2019-10-01 VITALS
TEMPERATURE: 98.4 F | DIASTOLIC BLOOD PRESSURE: 82 MMHG | HEART RATE: 96 BPM | SYSTOLIC BLOOD PRESSURE: 106 MMHG | OXYGEN SATURATION: 97 % | RESPIRATION RATE: 15 BRPM

## 2019-10-01 DIAGNOSIS — K59.00 CONSTIPATION, UNSPECIFIED CONSTIPATION TYPE: ICD-10-CM

## 2019-10-01 DIAGNOSIS — S70.222A: ICD-10-CM

## 2019-10-01 DIAGNOSIS — M79.10 MYALGIA: ICD-10-CM

## 2019-10-01 DIAGNOSIS — M54.50 CHRONIC LOW BACK PAIN, UNSPECIFIED BACK PAIN LATERALITY, UNSPECIFIED WHETHER SCIATICA PRESENT: ICD-10-CM

## 2019-10-01 DIAGNOSIS — J30.9 ALLERGIC RHINITIS, UNSPECIFIED SEASONALITY, UNSPECIFIED TRIGGER: ICD-10-CM

## 2019-10-01 DIAGNOSIS — G89.29 CHRONIC LOW BACK PAIN, UNSPECIFIED BACK PAIN LATERALITY, UNSPECIFIED WHETHER SCIATICA PRESENT: ICD-10-CM

## 2019-10-01 DIAGNOSIS — R15.9 BOWEL AND BLADDER INCONTINENCE: ICD-10-CM

## 2019-10-01 DIAGNOSIS — G82.20 PARAPLEGIA (HCC): Primary | ICD-10-CM

## 2019-10-01 DIAGNOSIS — R32 BOWEL AND BLADDER INCONTINENCE: ICD-10-CM

## 2019-10-01 PROCEDURE — 99214 OFFICE O/P EST MOD 30 MIN: CPT | Performed by: NURSE PRACTITIONER

## 2019-10-01 RX ORDER — IBUPROFEN 800 MG/1
800 TABLET ORAL EVERY 6 HOURS PRN
Qty: 120 TABLET | Refills: 1 | Status: SHIPPED | OUTPATIENT
Start: 2019-10-01 | End: 2022-01-27 | Stop reason: SDUPTHER

## 2019-10-01 RX ORDER — TRAZODONE HYDROCHLORIDE 50 MG/1
TABLET ORAL
Refills: 2 | COMMUNITY
Start: 2019-09-25 | End: 2021-06-11 | Stop reason: SDUPTHER

## 2019-10-01 RX ORDER — DIAPER,BRIEF,ADULT, DISPOSABLE
EACH MISCELLANEOUS
Refills: 2 | COMMUNITY
Start: 2019-08-17 | End: 2022-02-14 | Stop reason: ALTCHOICE

## 2019-10-01 RX ORDER — FLUOXETINE HYDROCHLORIDE 20 MG/1
CAPSULE ORAL
Refills: 2 | COMMUNITY
Start: 2019-09-25 | End: 2020-01-03 | Stop reason: SDUPTHER

## 2019-10-01 RX ORDER — HYDROCORTISONE 1 G/100G
CREAM TOPICAL
Refills: 0 | COMMUNITY
Start: 2019-08-16 | End: 2019-12-05 | Stop reason: ALTCHOICE

## 2019-10-01 RX ORDER — LORATADINE 10 MG/1
10 TABLET ORAL DAILY
Qty: 90 TABLET | Refills: 1 | Status: SHIPPED | OUTPATIENT
Start: 2019-10-01 | End: 2021-03-22 | Stop reason: ALTCHOICE

## 2019-10-02 ASSESSMENT — ENCOUNTER SYMPTOMS
EYE ITCHING: 0
SHORTNESS OF BREATH: 0
VOMITING: 0
SINUS PRESSURE: 1
NAUSEA: 0
WHEEZING: 0
EYE REDNESS: 0
SINUS PAIN: 0
EYE DISCHARGE: 0
SORE THROAT: 0
CHEST TIGHTNESS: 0
DIARRHEA: 0
ABDOMINAL PAIN: 0
CONSTIPATION: 1
COUGH: 0
EYE PAIN: 0
TROUBLE SWALLOWING: 0

## 2019-10-10 ENCOUNTER — TELEPHONE (OUTPATIENT)
Dept: FAMILY MEDICINE CLINIC | Age: 29
End: 2019-10-10

## 2019-10-10 DIAGNOSIS — S70.222A: Primary | ICD-10-CM

## 2019-10-10 DIAGNOSIS — L08.9 INFECTED SKIN LESION: ICD-10-CM

## 2019-10-10 RX ORDER — BACITRACIN, NEOMYCIN, POLYMYXIN B 400; 3.5; 5 [USP'U]/G; MG/G; [USP'U]/G
OINTMENT TOPICAL
Qty: 14 G | Refills: 0 | Status: SHIPPED | OUTPATIENT
Start: 2019-10-10 | End: 2019-10-20

## 2019-10-22 ENCOUNTER — TELEPHONE (OUTPATIENT)
Dept: FAMILY MEDICINE CLINIC | Age: 29
End: 2019-10-22

## 2019-10-22 DIAGNOSIS — R05.9 COUGH: Primary | ICD-10-CM

## 2019-10-22 RX ORDER — GUAIFENESIN 600 MG/1
600 TABLET, EXTENDED RELEASE ORAL 2 TIMES DAILY
Qty: 30 TABLET | Refills: 0 | Status: SHIPPED | OUTPATIENT
Start: 2019-10-22 | End: 2019-11-06

## 2019-11-25 ENCOUNTER — TELEPHONE (OUTPATIENT)
Dept: FAMILY MEDICINE CLINIC | Age: 29
End: 2019-11-25

## 2019-11-26 ENCOUNTER — HOSPITAL ENCOUNTER (OUTPATIENT)
Dept: PHYSICAL THERAPY | Age: 29
Setting detail: THERAPIES SERIES
Discharge: HOME | End: 2019-11-26
Payer: MEDICAID

## 2019-11-26 PROCEDURE — 97110 THERAPEUTIC EXERCISES: CPT

## 2019-11-26 PROCEDURE — 97162 PT EVAL MOD COMPLEX 30 MIN: CPT

## 2019-11-26 ASSESSMENT — PAIN - FUNCTIONAL ASSESSMENT: PAIN_FUNCTIONAL_ASSESSMENT: PREVENTS OR INTERFERES WITH ALL ACTIVE AND SOME PASSIVE ACTIVITIES

## 2019-11-26 ASSESSMENT — PAIN DESCRIPTION - ONSET: ONSET: AWAKENED FROM SLEEP

## 2019-11-26 ASSESSMENT — PAIN DESCRIPTION - ORIENTATION: ORIENTATION: RIGHT;LEFT

## 2019-11-26 ASSESSMENT — PAIN DESCRIPTION - LOCATION: LOCATION: BACK

## 2019-11-26 ASSESSMENT — PAIN DESCRIPTION - FREQUENCY: FREQUENCY: CONTINUOUS

## 2019-11-26 ASSESSMENT — PAIN DESCRIPTION - PAIN TYPE: TYPE: CHRONIC PAIN

## 2019-11-26 ASSESSMENT — PAIN SCALES - GENERAL: PAINLEVEL_OUTOF10: 2

## 2019-11-26 ASSESSMENT — PAIN DESCRIPTION - DESCRIPTORS: DESCRIPTORS: DULL

## 2019-11-26 ASSESSMENT — PAIN DESCRIPTION - PROGRESSION: CLINICAL_PROGRESSION: GRADUALLY WORSENING

## 2019-12-05 ENCOUNTER — OFFICE VISIT (OUTPATIENT)
Dept: FAMILY MEDICINE CLINIC | Age: 29
End: 2019-12-05
Payer: MEDICAID

## 2019-12-05 VITALS
RESPIRATION RATE: 16 BRPM | DIASTOLIC BLOOD PRESSURE: 84 MMHG | TEMPERATURE: 98.7 F | OXYGEN SATURATION: 96 % | SYSTOLIC BLOOD PRESSURE: 124 MMHG | HEART RATE: 103 BPM

## 2019-12-05 DIAGNOSIS — J01.10 ACUTE NON-RECURRENT FRONTAL SINUSITIS: Primary | ICD-10-CM

## 2019-12-05 DIAGNOSIS — J02.9 SORE THROAT: ICD-10-CM

## 2019-12-05 PROCEDURE — 99213 OFFICE O/P EST LOW 20 MIN: CPT | Performed by: NURSE PRACTITIONER

## 2019-12-05 RX ORDER — AMOXICILLIN 500 MG/1
500 CAPSULE ORAL 2 TIMES DAILY
Qty: 20 CAPSULE | Refills: 0 | Status: SHIPPED | OUTPATIENT
Start: 2019-12-05 | End: 2019-12-15

## 2019-12-05 ASSESSMENT — ENCOUNTER SYMPTOMS
SORE THROAT: 1
WHEEZING: 0
CHEST TIGHTNESS: 0
NAUSEA: 1
COUGH: 1
VOICE CHANGE: 1
SHORTNESS OF BREATH: 0
ABDOMINAL PAIN: 0

## 2019-12-06 ASSESSMENT — ENCOUNTER SYMPTOMS
SINUS PRESSURE: 0
RHINORRHEA: 0
SINUS PAIN: 0

## 2020-01-03 RX ORDER — FLUOXETINE HYDROCHLORIDE 20 MG/1
20 CAPSULE ORAL DAILY
Qty: 30 CAPSULE | Refills: 0 | Status: SHIPPED | OUTPATIENT
Start: 2020-01-03 | End: 2020-07-31 | Stop reason: ALTCHOICE

## 2020-01-03 RX ORDER — FLUOXETINE 10 MG/1
10 CAPSULE ORAL DAILY
Qty: 30 CAPSULE | Refills: 0 | Status: SHIPPED | OUTPATIENT
Start: 2020-01-03 | End: 2021-02-23 | Stop reason: HOSPADM

## 2020-02-24 ENCOUNTER — OFFICE VISIT (OUTPATIENT)
Dept: FAMILY MEDICINE CLINIC | Age: 30
End: 2020-02-24
Payer: COMMERCIAL

## 2020-02-24 VITALS
DIASTOLIC BLOOD PRESSURE: 70 MMHG | TEMPERATURE: 97.2 F | HEART RATE: 76 BPM | WEIGHT: 200 LBS | HEIGHT: 64 IN | BODY MASS INDEX: 34.15 KG/M2 | SYSTOLIC BLOOD PRESSURE: 110 MMHG

## 2020-02-24 PROCEDURE — 99213 OFFICE O/P EST LOW 20 MIN: CPT | Performed by: NURSE PRACTITIONER

## 2020-02-24 RX ORDER — HYDROXYZINE PAMOATE 25 MG/1
CAPSULE ORAL
COMMUNITY
Start: 2019-12-23 | End: 2021-05-21 | Stop reason: ALTCHOICE

## 2020-02-24 ASSESSMENT — ENCOUNTER SYMPTOMS
WHEEZING: 0
SORE THROAT: 0
CHEST TIGHTNESS: 0
GASTROINTESTINAL NEGATIVE: 1
SHORTNESS OF BREATH: 0
TROUBLE SWALLOWING: 0
COUGH: 0

## 2020-02-24 NOTE — PROGRESS NOTES
facility-administered medications on file prior to visit. Past Medical History:   Diagnosis Date    Back pain     Depression     Encephalitis     Meningitis     Paraplegia ([facility] [address]     Suicidal ideation      Past Surgical History:   Procedure Laterality Date    PRE-MALIGNANT / BENIGN SKIN LESION EXCISION      2015 left shoulder     No family history on file. Social History     Socioeconomic History    Marital status: Single     Spouse name: Not on file    Number of children: Not on file    Years of education: Not on file    Highest education level: Not on file   Occupational History    Not on file   Social Needs    Financial resource strain: Not on file    Food insecurity:     Worry: Not on file     Inability: Not on file    Transportation needs:     Medical: Not on file     Non-medical: Not on file   Tobacco Use    Smoking status: Never Smoker    Smokeless tobacco: Never Used   Substance and Sexual Activity    Alcohol use: No    Drug use: No    Sexual activity: Never   Lifestyle    Physical activity:     Days per week: Not on file     Minutes per session: Not on file    Stress: Not on file   Relationships    Social connections:     Talks on phone: Not on file     Gets together: Not on file     Attends Adventism service: Not on file     Active member of club or organization: Not on file     Attends meetings of clubs or organizations: Not on file     Relationship status: Not on file    Intimate partner violence:     Fear of current or ex partner: Not on file     Emotionally abused: Not on file     Physically abused: Not on file     Forced sexual activity: Not on file   Other Topics Concern    Not on file   Social History Narrative    Lives in group home. Wheelchair-bound since meningitis at 13 yo       Review of Systems   Constitutional: Negative for activity change, appetite change, chills, diaphoresis, fatigue, fever and unexpected weight change.    HENT: Negative for congestion, sore throat and trouble swallowing. Respiratory: Negative for cough, chest tightness, shortness of breath and wheezing. Cardiovascular: Negative for chest pain and palpitations. Gastrointestinal: Negative. Musculoskeletal: Negative. Neurological: Negative for dizziness, seizures, light-headedness and headaches. Psychiatric/Behavioral: Positive for dysphoric mood. Negative for agitation, behavioral problems, confusion, decreased concentration, hallucinations, self-injury, sleep disturbance and suicidal ideas. The patient is nervous/anxious. The patient is not hyperactive. OBJECTIVE:     /70 (Site: Right Upper Arm, Position: Sitting, Cuff Size: Medium Adult)   Pulse 76   Temp 97.2 °F (36.2 °C)   Ht 5' 4\" (1.626 m)   Wt 200 lb (90.7 kg)   BMI 34.33 kg/m²     Physical Exam  Vitals signs reviewed. Constitutional:       General: She is not in acute distress. Appearance: Normal appearance. She is well-developed. She is obese. She is not ill-appearing or diaphoretic. HENT:      Head: Normocephalic and atraumatic. Right Ear: External ear normal.      Left Ear: External ear normal.      Nose: Nose normal.   Eyes:      Conjunctiva/sclera: Conjunctivae normal.      Pupils: Pupils are equal, round, and reactive to light. Neck:      Musculoskeletal: Normal range of motion and neck supple. Cardiovascular:      Rate and Rhythm: Normal rate and regular rhythm. Heart sounds: Normal heart sounds. Pulmonary:      Effort: Pulmonary effort is normal.      Breath sounds: Normal breath sounds. Abdominal:      Palpations: Abdomen is soft. Musculoskeletal: Normal range of motion. Skin:     General: Skin is warm and dry. Neurological:      Mental Status: She is alert and oriented to person, place, and time. Psychiatric:         Behavior: Behavior normal.         Thought Content:  Thought content normal.         Judgment: Judgment normal.         No results found for requested labs within last 30 days. Hemoglobin A1C (%)   Date Value   06/21/2016 4.9     LDL Calculated (mg/dL)   Date Value   08/29/2019 97       Lab Results   Component Value Date    WBC 10.6 08/29/2019    WBC 10.9 08/24/2019    WBC 10.5 02/11/2019    NEUTROABS 7.2 08/29/2019    HGB 11.7 08/29/2019    HGB 10.9 08/24/2019    HGB 10.8 02/11/2019    HCT 36.6 08/29/2019    HCT 38.3 08/24/2019    HCT 34.7 02/11/2019    MCV 79.2 08/29/2019    MCV 85.9 08/24/2019    MCV 85.5 02/11/2019     08/29/2019     08/24/2019     02/11/2019    SEGSABS 7.9 08/24/2019    SEGSABS 7.4 02/11/2019    SEGSABS 7.0 10/01/2018    LYMPHSABS 2.3 08/29/2019    MONOSABS 0.6 08/29/2019    EOSABS 0.4 08/29/2019    BASOSABS 0.0 08/29/2019     Lab Results   Component Value Date    TSH 1.59 08/29/2019    TSHHS 1.100 10/01/2018     Lab Results   Component Value Date    LABALBU 4.2 08/29/2019    BILITOT <0.2 08/29/2019    AST 19 08/29/2019    ALT 15 08/29/2019    ALKPHOS 87 08/29/2019             No results found for this visit on 02/24/20. ASSESSMENT AND PLAN:     1. Follow-up exam  - stable for suicide ideations at this time    2. Paraplegia ([address]    3. History of risk factor for suicide  - keep appointment with psychiatry  - reinforced use of safety plan  - keep 24 hour home health help      Return in about 4 months (around 6/24/2020). Care discussed with patient. Patient educated on signs and symptoms of exacerbation and when to seek further medical attention. Advised to call for any problems, questions, or concerns. Patient verbalizes understanding and agrees with plan. Medications reviewed and reconciled. Continue current medications. Appropriate prescriptions are ordered. Risks and benefits of meds are discussed. After visit summary provided.

## 2020-06-29 ENCOUNTER — TELEPHONE (OUTPATIENT)
Dept: FAMILY MEDICINE CLINIC | Age: 30
End: 2020-06-29

## 2020-06-29 ENCOUNTER — HOSPITAL ENCOUNTER (EMERGENCY)
Age: 30
Discharge: PSYCHIATRIC OR SUBSTANCE USE FACILITY | End: 2020-06-30
Attending: EMERGENCY MEDICINE
Payer: COMMERCIAL

## 2020-06-29 LAB
ACETAMINOPHEN LEVEL: <5 UG/ML (ref 15–30)
ALBUMIN SERPL-MCNC: 3.8 GM/DL (ref 3.4–5)
ALCOHOL SCREEN SERUM: <0.01 %WT/VOL
ALP BLD-CCNC: 64 IU/L (ref 40–129)
ALT SERPL-CCNC: 9 U/L (ref 10–40)
AMPHETAMINES: NEGATIVE
ANION GAP SERPL CALCULATED.3IONS-SCNC: 14 MMOL/L (ref 4–16)
AST SERPL-CCNC: 12 IU/L (ref 15–37)
BARBITURATE SCREEN URINE: NEGATIVE
BASOPHILS ABSOLUTE: 0 K/CU MM
BASOPHILS RELATIVE PERCENT: 0.3 % (ref 0–1)
BENZODIAZEPINE SCREEN, URINE: NEGATIVE
BILIRUB SERPL-MCNC: 0.2 MG/DL (ref 0–1)
BUN BLDV-MCNC: 12 MG/DL (ref 6–23)
CALCIUM SERPL-MCNC: 9.1 MG/DL (ref 8.3–10.6)
CANNABINOID SCREEN URINE: NEGATIVE
CHLORIDE BLD-SCNC: 101 MMOL/L (ref 99–110)
CO2: 22 MMOL/L (ref 21–32)
COCAINE METABOLITE: NEGATIVE
CREAT SERPL-MCNC: 0.5 MG/DL (ref 0.6–1.1)
DIFFERENTIAL TYPE: ABNORMAL
DOSE AMOUNT: ABNORMAL
DOSE AMOUNT: ABNORMAL
DOSE TIME: ABNORMAL
DOSE TIME: ABNORMAL
EOSINOPHILS ABSOLUTE: 0.2 K/CU MM
EOSINOPHILS RELATIVE PERCENT: 1.5 % (ref 0–3)
GFR AFRICAN AMERICAN: >60 ML/MIN/1.73M2
GFR NON-AFRICAN AMERICAN: >60 ML/MIN/1.73M2
GLUCOSE BLD-MCNC: 97 MG/DL (ref 70–99)
HCG QUALITATIVE: NEGATIVE
HCT VFR BLD CALC: 36.8 % (ref 37–47)
HEMOGLOBIN: 10.9 GM/DL (ref 12.5–16)
IMMATURE NEUTROPHIL %: 0.2 % (ref 0–0.43)
LACTATE: 1.4 MMOL/L (ref 0.4–2)
LACTATE: 2.8 MMOL/L (ref 0.4–2)
LYMPHOCYTES ABSOLUTE: 2.4 K/CU MM
LYMPHOCYTES RELATIVE PERCENT: 23.2 % (ref 24–44)
MCH RBC QN AUTO: 24.9 PG (ref 27–31)
MCHC RBC AUTO-ENTMCNC: 29.6 % (ref 32–36)
MCV RBC AUTO: 84 FL (ref 78–100)
MONOCYTES ABSOLUTE: 0.5 K/CU MM
MONOCYTES RELATIVE PERCENT: 5.1 % (ref 0–4)
NUCLEATED RBC %: 0 %
OPIATES, URINE: NEGATIVE
OXYCODONE: NEGATIVE
PDW BLD-RTO: 14 % (ref 11.7–14.9)
PHENCYCLIDINE, URINE: NEGATIVE
PLATELET # BLD: 420 K/CU MM (ref 140–440)
PMV BLD AUTO: 8.7 FL (ref 7.5–11.1)
POTASSIUM SERPL-SCNC: 3.5 MMOL/L (ref 3.5–5.1)
RBC # BLD: 4.38 M/CU MM (ref 4.2–5.4)
SALICYLATE LEVEL: <0.3 MG/DL (ref 15–30)
SEGMENTED NEUTROPHILS ABSOLUTE COUNT: 7.1 K/CU MM
SEGMENTED NEUTROPHILS RELATIVE PERCENT: 69.7 % (ref 36–66)
SODIUM BLD-SCNC: 137 MMOL/L (ref 135–145)
TOTAL IMMATURE NEUTOROPHIL: 0.02 K/CU MM
TOTAL NUCLEATED RBC: 0 K/CU MM
TOTAL PROTEIN: 7.7 GM/DL (ref 6.4–8.2)
WBC # BLD: 10.2 K/CU MM (ref 4–10.5)

## 2020-06-29 PROCEDURE — G0480 DRUG TEST DEF 1-7 CLASSES: HCPCS

## 2020-06-29 PROCEDURE — 84703 CHORIONIC GONADOTROPIN ASSAY: CPT

## 2020-06-29 PROCEDURE — 85025 COMPLETE CBC W/AUTO DIFF WBC: CPT

## 2020-06-29 PROCEDURE — 80307 DRUG TEST PRSMV CHEM ANLYZR: CPT

## 2020-06-29 PROCEDURE — 96372 THER/PROPH/DIAG INJ SC/IM: CPT

## 2020-06-29 PROCEDURE — 83605 ASSAY OF LACTIC ACID: CPT

## 2020-06-29 PROCEDURE — 87040 BLOOD CULTURE FOR BACTERIA: CPT

## 2020-06-29 PROCEDURE — 80053 COMPREHEN METABOLIC PANEL: CPT

## 2020-06-29 PROCEDURE — 6360000002 HC RX W HCPCS: Performed by: PHYSICIAN ASSISTANT

## 2020-06-29 PROCEDURE — 99285 EMERGENCY DEPT VISIT HI MDM: CPT

## 2020-06-29 RX ORDER — LORAZEPAM 2 MG/ML
2 INJECTION INTRAMUSCULAR ONCE
Status: COMPLETED | OUTPATIENT
Start: 2020-06-29 | End: 2020-06-29

## 2020-06-29 RX ORDER — LORAZEPAM 2 MG/ML
1 INJECTION INTRAMUSCULAR ONCE
Status: DISCONTINUED | OUTPATIENT
Start: 2020-06-29 | End: 2020-06-29

## 2020-06-29 RX ADMIN — LORAZEPAM 2 MG: 2 INJECTION INTRAMUSCULAR; INTRAVENOUS at 15:15

## 2020-06-29 ASSESSMENT — PAIN DESCRIPTION - LOCATION: LOCATION: OTHER (COMMENT)

## 2020-06-29 ASSESSMENT — PAIN DESCRIPTION - PAIN TYPE: TYPE: CHRONIC PAIN

## 2020-06-29 ASSESSMENT — PAIN SCALES - GENERAL: PAINLEVEL_OUTOF10: 5

## 2020-06-29 ASSESSMENT — PAIN DESCRIPTION - FREQUENCY: FREQUENCY: CONTINUOUS

## 2020-06-29 NOTE — ED NOTES
Pt changed in green gown. All belongings bagged and given to security ( shirt, two stuffed unicorns, cell phone with charge, Extension cord). While changing pt and collecting belongings pt became upset and was yelling and refusing to change. im ativan given. Pt then calmed down and let us change her, collect blood and urine. 1:1 sitter present per protocol.       Mart Chaudhari RN  06/29/20 6365
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
## 2020-06-29 NOTE — ED NOTES
This RN in room to assume sitter 1:1 role. Room checked for safety. Introduced self to patient and offered needs, denies needs at this time.       Yadira Tavares RN  06/29/20 1939

## 2020-06-29 NOTE — ED NOTES
WESLEY GENTILE IN TO SEE PT AT [address] [place] [address] Rd  Theador Amend  06/29/20 5699

## 2020-06-29 NOTE — ED NOTES
This tech took patient into Trauma 2 to get the patient changed into green gown and collected her belongings, while doing so the patient shouted \"go fuck yourself, or go die\" multiple times to staff, the patient was given ativan to calm the patient down, and then the patient agreed to allow staff to take her belongings and assist her to change into the green gown. After changing patient into green gown she refused to have blood work,after talking with the patient she agreed to blood work and straight cath to obtain urine specimen. While prepping for the straight cath we changed the patients depend, and put clean sheets under her because hers were wet. When we started to clean patient to get urine sample, patient began urinating and we obtained the specimen and returned patient to the [facility]antiago until next room is available.       Shabbir Callaway  06/29/20 1031

## 2020-06-29 NOTE — ED PROVIDER NOTES
I independently examined and evaluated Sunny Monzon. In brief, patient presenting with suicidal ideations, she states she does not want to live anymore. Focused exam revealed patient with bizarre behavior, states that she is suicidal and does not want to live anymore. ED course: Patient here for mental health issues, labs sent lactate normal no leukocytosis drug screen negative alcohol negative patient is medically cleared seen by crisis worker and determined patient would benefit from inpatient psychiatric care    All diagnostic, treatment, and disposition decisions were made by myself in conjunction with the advanced practice provider. For all further details of the patient's emergency department visit, please see the advanced practice provider's documentation. Comment: Please note this report has been produced using speech recognition software and may contain errors related to that system including errors in grammar, punctuation, and spelling, as well as words and phrases that may be inappropriate. If there are any questions or concerns please feel free to contact the dictating provider for clarification.         Sachin Ramsey MD  06/29/20 2083

## 2020-06-29 NOTE — ED NOTES
Remains under SI watch, sitter at bedside. No change in pt condition.      Niko Davis, RAKESH  06/29/20 6475

## 2020-06-29 NOTE — ED PROVIDER NOTES
Patient Identification  Eliza Higuera is a 26 y.o. female    Chief Complaint  Altered Mental Status and Wound Check      HPI  (History provided by patient)  This is a 26 y.o. female who was brought in by self for chief complaint of AMS, wound check. Patient has history of paraplegia secondary to meningitis as a child per patient. She states that she does not want to live anymore due to her condition, states that she has been refusing to allow her home health workers to come in, states that she \"cannot trust any of them\", reports that she \"cannot trust anyone\". She has a wound on her right gluteal fold per recent visit at outside hospital she refuses to allow me to see this wound. She readily admits that she is unable to care for herself at home and states that she does not want to allow the health workers to help her because she does not want to live anymore. She denies any falls or trauma. No changes in meds. REVIEW OF SYSTEMS    Constitutional:  Denies fever, chills  HENT:  Denies sore throat or ear pain   Eyes: Denies vision changes, eye pain  Cardiovascular:  Denies chest pain, syncope  Respiratory:  Denies shortness of breath, cough   GI:  Denies abdominal pain, nausea, vomiting  :  Denies dysuria, discharge  Musculoskeletal:  Denies back pain, joint pain  Skin:  Denies rash, pruritis  Neurologic:  Denies headache, focal weakness, or sensory changes     See HPI and nursing notes for additional information     I have reviewed the following nursing documentation:  Allergies: Allergies   Allergen Reactions    Fish-Derived Products     Lemon Extract [Flavoring Agent]        Past medical history:  has a past medical history of Back pain, Depression, Encephalitis, Meningitis, Paraplegia ([address] and Suicidal ideation. Past surgical history:  has a past surgical history that includes pre-malignant / benign skin lesion excision.     Home medications:   Prior to Admission medications Medication Sig Start Date End Date Taking? Authorizing Provider   hydrOXYzine (VISTARIL) 25 MG capsule  12/23/19   Historical Provider, MD   FLUoxetine (PROZAC) 20 MG capsule Take 1 capsule by mouth daily 1/3/20   PRECIOUS Baum CNP   FLUoxetine (PROZAC) 10 MG capsule Take 1 capsule by mouth daily 1/3/20   PRECIOUS Baum CNP   Diapers & Supplies MISC Use prn four times daily- Adult Depends/Pull ups. 10/2/19   PRECIOUS Baum CNP   traZODone (DESYREL) 50 MG tablet  9/25/19   Historical Provider, MD   Incontinence Supply Disposable (DEPEND PANT LARGE) MISC  8/17/19   Historical Provider, MD   loratadine (CLARITIN) 10 MG tablet Take 1 tablet by mouth daily 10/1/19   PRECIOUS Baum CNP   ibuprofen (ADVIL;MOTRIN) 800 MG tablet Take 1 tablet by mouth every 6 hours as needed for Pain 10/1/19   PRECIOUS Baum CNP   Diapers & Supplies MISC 1 each by Does not apply route every 4 hours for 28 days Change diaper every 4 hours. 8/29/19 2/24/20  PRECIOUS Baum CNP       Social history:  reports that she has never smoked. She has never used smokeless tobacco. She reports that she does not drink alcohol or use drugs. Family history:  History reviewed. No pertinent family history. Exam  /88   Pulse 96   Temp 98.4 °F (36.9 °C) (Oral)   Resp 15   Ht 5' 4\" (1.626 m)   Wt 200 lb (90.7 kg)   SpO2 96%   BMI 34.33 kg/m²   Nursing note and vitals reviewed. Constitutional: Well developed, well nourished. No acute distress. HENT:      Head: Normocephalic and atraumatic. Ears: External ears normal.      Nose: Nose normal.     Mouth: Membrane mucosa moist and pink. No posterior oropharynx erythema or tonsillar edema  Eyes: Anicteric sclera. No discharge, PERRL  Neck: Supple. Trachea midline. Cardiovascular: RRR, no murmurs, rubs, or gallops, radial pulses 2+ bilaterally. Pulmonary/Chest: Effort normal. No respiratory distress. CTAB. No stridor. No wheezes. No rales. Abdominal: Soft. Nontender to palpation. No distension. No guarding, rebound tenderness, or evidence of ascites. : No CVA tenderness. Musculoskeletal: Moves all extremities. No gross deformity. Neurological: Alert and oriented to person, place, and time. Normal muscle tone. Skin: Warm and dry. No rash. Exam chaperoned by Kai Bingham ED tech. Patient has 2 stage III pressure wounds noted on the right gluteal fold, that appear to have clean bases, largest is approximately 3 cm of stage 1 with central deeper area the size of a nickel that is stage 3, other is approximately the size of a dime. There is no drainage. No surrounding erythema. Psychiatric: Anxious, paranoid, endorses active SI.          Labs  Results for orders placed or performed during the hospital encounter of 06/29/20   Culture, Blood 1   Result Value Ref Range    Specimen BLOOD     Culture NO GROWTH AT 4 DAYS    Culture, Blood 2   Result Value Ref Range    Specimen BLOOD     Culture NO GROWTH AT 4 DAYS    CBC Auto Differential   Result Value Ref Range    WBC 10.2 4.0 - 10.5 K/CU MM    RBC 4.38 4.2 - 5.4 M/CU MM    Hemoglobin 10.9 (L) 12.5 - 16.0 GM/DL    Hematocrit 36.8 (L) 37 - 47 %    MCV 84.0 78 - 100 FL    MCH 24.9 (L) 27 - 31 PG    MCHC 29.6 (L) 32.0 - 36.0 %    RDW 14.0 11.7 - 14.9 %    Platelets 700 407 - 720 K/CU MM    MPV 8.7 7.5 - 11.1 FL    Differential Type AUTOMATED DIFFERENTIAL     Segs Relative 69.7 (H) 36 - 66 %    Lymphocytes % 23.2 (L) 24 - 44 %    Monocytes % 5.1 (H) 0 - 4 %    Eosinophils % 1.5 0 - 3 %    Basophils % 0.3 0 - 1 %    Segs Absolute 7.1 K/CU MM    Lymphocytes Absolute 2.4 K/CU MM    Monocytes Absolute 0.5 K/CU MM    Eosinophils Absolute 0.2 K/CU MM    Basophils Absolute 0.0 K/CU MM    Nucleated RBC % 0.0 %    Total Nucleated RBC 0.0 K/CU MM    Total Immature Neutrophil 0.02 K/CU MM    Immature Neutrophil % 0.2 0 - 0.43 %   CMP   Result Value Ref Range    Sodium 137 135 - 145 MMOL/L    Potassium 3.5 3.5 - 5.1 MMOL/L    Chloride 101 99 - 110 mMol/L    CO2 22 21 - 32 MMOL/L    BUN 12 6 - 23 MG/DL    CREATININE 0.5 (L) 0.6 - 1.1 MG/DL    Glucose 97 70 - 99 MG/DL    Calcium 9.1 8.3 - 10.6 MG/DL    Alb 3.8 3.4 - 5.0 GM/DL    Total Protein 7.7 6.4 - 8.2 GM/DL    Total Bilirubin 0.2 0.0 - 1.0 MG/DL    ALT 9 (L) 10 - 40 U/L    AST 12 (L) 15 - 37 IU/L    Alkaline Phosphatase 64 40 - 129 IU/L    GFR Non-African American >60 >60 mL/min/1.73m2    GFR African American >60 >60 mL/min/1.73m2    Anion Gap 14 4 - 16   Salicylate   Result Value Ref Range    Salicylate Lvl <6.2 (L) 15 - 30 MG/DL    DOSE AMOUNT DOSE AMT. GIVEN - UNKNOWN     DOSE TIME DOSE TIME GIVEN - UNKNOWN    Acetaminophen level   Result Value Ref Range    Acetaminophen Level <5.0 (L) 15 - 30 ug/ml    DOSE AMOUNT DOSE AMT. GIVEN - UNKNOWN     DOSE TIME DOSE TIME GIVEN - UNKNOWN    Urine Drug Screen   Result Value Ref Range    Cannabinoid Scrn, Ur NEGATIVE NEGATIVE    Amphetamines NEGATIVE NEGATIVE    Cocaine Metabolite NEGATIVE NEGATIVE    Benzodiazepine Screen, Urine NEGATIVE NEGATIVE    Barbiturate Screen, Ur NEGATIVE NEGATIVE    Opiates, Urine NEGATIVE NEGATIVE    Phencyclidine, Urine NEGATIVE NEGATIVE    Oxycodone NEGATIVE NEGATIVE   Ethanol   Result Value Ref Range    Alcohol Scrn <0.01 <0.01 %WT/VOL   HCG Serum, Qualitative   Result Value Ref Range    hCG Qual NEGATIVE    Lactic Acid, Plasma   Result Value Ref Range    Lactate 2.8 (HH) 0.4 - 2.0 mMOL/L   Lactic Acid, Plasma   Result Value Ref Range    Lactate 1.4 0.4 - 2.0 mMOL/L         MDM  Patient presents for SI, seems paranoid, admits to plan to stop receiving care until she dies. Initially agitated, given ativan and much more cooperative. Workup reveals mild anemia and elevated lactic acid level that resolved without treatment. Plan is to obtain  eval for placement in inpatient psychiatric care. This patient was also seen and evaluated by Dr. Hari Espinoza. Final Impression  1.  Suicidal behavior

## 2020-06-29 NOTE — ED NOTES
While sitting with this patient, this tech spoke with her about things that were going on in her life. She told me that the staff that she has at home do not treat her properly and are constantly picking on her. The patient told this tech about her history that caused her to feel the way she does today. The patient told this tech that she hasnt eaten in a week, this tech provided friesx3, a hamburger, and a milkshake made from peanut butter and chocolate ice cream. The patient was watching tv but has tried to reach her brother multiple times but after failed attempts the patient has decided she doesn't want to watch tv. The patient continues to talk to this tech, and states she is comfortable. Natasha Benitez was called to the patients bed side to check on her unicorns, rachael satisfied the patient with his answers and patient is now resting comfortably in the bed.       Javier German  06/29/20 3149

## 2020-06-29 NOTE — TELEPHONE ENCOUNTER
Galen Berman, , called because concerned about patient. For 1 month, patient talking about dying, talking erratically erratically, hostile towards caregivers, not takig her medication. Suspicious of caregivers. Wants to die. Dx stage 3 decubitus ulcer yesterday at another facility, but she is refusing outpatient treatment. Plan: I called the ER (Dr. Manolo Wynne). He recommended that I call the police and they can in turn call for the EMS to help to take her in to the hospital where she can be pink slipped. I spoke to leo's office and they will f/u with checking on her.

## 2020-06-29 NOTE — ED NOTES
Bed: ED-16  Expected date:   Expected time:   Means of arrival:   Comments:  Gaye Clements RN  06/29/20 2701

## 2020-06-29 NOTE — ED NOTES
Patient requesting to speak with Tyron Dsouza RN at this time. Tyron Dsouza notified at this time.       Matthew Turner RN  06/29/20 1930

## 2020-06-29 NOTE — ED TRIAGE NOTES
Pt lives at home by self. Has 24 hour home health. In the last week she has started to refuse care. Currently has stage 3 wounds and is refusing dressing changes. Has called the  on staff 30xs in the last weeks. Police have pink slipped her at this time. Pt refused to tell me where wounds are and would not let me look at them at this time.  Likes to go by the name \"dayne\"

## 2020-06-30 VITALS
BODY MASS INDEX: 34.15 KG/M2 | WEIGHT: 200 LBS | SYSTOLIC BLOOD PRESSURE: 118 MMHG | DIASTOLIC BLOOD PRESSURE: 88 MMHG | HEART RATE: 96 BPM | HEIGHT: 64 IN | TEMPERATURE: 98.4 F | OXYGEN SATURATION: 96 % | RESPIRATION RATE: 15 BRPM

## 2020-06-30 PROCEDURE — 6370000000 HC RX 637 (ALT 250 FOR IP): Performed by: EMERGENCY MEDICINE

## 2020-06-30 RX ORDER — DIPHENHYDRAMINE HCL 25 MG
25 TABLET ORAL ONCE
Status: COMPLETED | OUTPATIENT
Start: 2020-06-30 | End: 2020-06-30

## 2020-06-30 RX ADMIN — DIPHENHYDRAMINE HCL 25 MG: 25 TABLET ORAL at 03:01

## 2020-06-30 NOTE — ED NOTES
Updated Tristen Metzger pt's care provider on the plan to transfer pt at [facility]  06/30/20 1304

## 2020-06-30 NOTE — ED NOTES
This nurse and the sitter placed a chux under the patient who sts she does not want a diaper on. Changed into a blue gown. The patient's belonging bags are given to the medics and she has signed for them. Dressing change sent with the medics, as well. The patient is never left alone per suicide precaution protocol.       Liz Wakefield RN  06/30/20 0081

## 2020-06-30 NOTE — ED NOTES
The patient is lying on a chux which is draining clear fluid from the dec ulcer on the right upper thigh. Skin cleansed and dressing applied, fresh chux per Tessy Penn RN.       Carlos Bender RN  06/30/20 6802

## 2020-06-30 NOTE — ED NOTES
Patient requesting to ask security how her unicorns are doing. Also requesting something to drink. Have tried letting nurse know and also tried calling tech no answer.       Sally Cronin  06/29/20 2727

## 2020-06-30 NOTE — ED NOTES
Final report received per nurse at the [facility].       Darline De León RN  06/30/20 2069

## 2020-06-30 NOTE — ED NOTES
[email or web address] to [facility]. Followup report needs to be called by dayshift prior to pt departure.  #[phone]     Adrienne Dexter RN  06/30/20 7380

## 2020-06-30 NOTE — ED NOTES
The patient left without incident. This nurse attempted to call the [facility] back to make them aware, but there was no answer.       Linda Bills RN  06/30/20 1557

## 2020-06-30 NOTE — ED NOTES
The patient is resting at this time with her eyes closed. She is breathing normally. 1:1 monitoring per the sitter inside the room, continues.       Ana Ho RN  06/30/20 8226

## 2020-06-30 NOTE — ED NOTES
PT states Jossie Hardin is this hospital can do for her that no other hospital has been able to do. \"  ///////////Nothing Follows///////////      Margaret Locus  06/30/20 0201

## 2020-06-30 NOTE — ED NOTES
Report from [address] [facility]  06/30/20 5039

## 2020-06-30 NOTE — ED NOTES
Patient becoming upset and stating she does not want to stay at this hospital any longer.  Requesting to speak with doctor also requesting for a new \" counselor \" to get things moving so she can get out of this hospital.      Lam Mccullough  06/30/20 0118

## 2020-06-30 NOTE — ED NOTES
Patient being changed and cleaned by RN Evan Gross, and FRANTZ montemayor at this time.       Hermon Cooks  06/30/20 0127

## 2020-06-30 NOTE — ED NOTES
Given deoderant and assisted medics to move the patient. Sent with her own pillows. She sts she does not have a wheel chair, it is broken. The patient is co-operative and understands where and why she is going.       Ann Moreno RN  06/30/20 1623

## 2020-06-30 NOTE — ED NOTES
Pt changed, new sheet, chucks pad, and depends put on pt.  Pt cleaned     Per Alva RN  06/30/20 0000

## 2020-06-30 NOTE — ED PROVIDER NOTES
NOLA JARAMILLO          Final Impression      1.  Suicidal behavior without attempted self-injury        DISPOSITION       (Please note that portions of this note may have been completed with a voice recognition program. Efforts were made to edit the dictations but occasionally words are mis-transcribed.)    Dorothy Gregg MD  [address] [address]  06/30/20 2094

## 2020-07-06 LAB
CULTURE: NORMAL
CULTURE: NORMAL
Lab: NORMAL
Lab: NORMAL
SPECIMEN: NORMAL
SPECIMEN: NORMAL

## 2020-07-24 ENCOUNTER — TELEMEDICINE (OUTPATIENT)
Dept: FAMILY MEDICINE CLINIC | Age: 30
End: 2020-07-24
Payer: COMMERCIAL

## 2020-07-24 PROCEDURE — 99213 OFFICE O/P EST LOW 20 MIN: CPT | Performed by: NURSE PRACTITIONER

## 2020-07-24 PROCEDURE — 1036F TOBACCO NON-USER: CPT | Performed by: NURSE PRACTITIONER

## 2020-07-24 PROCEDURE — G8417 CALC BMI ABV UP PARAM F/U: HCPCS | Performed by: NURSE PRACTITIONER

## 2020-07-24 PROCEDURE — G8428 CUR MEDS NOT DOCUMENT: HCPCS | Performed by: NURSE PRACTITIONER

## 2020-07-24 ASSESSMENT — ENCOUNTER SYMPTOMS
WHEEZING: 0
ABDOMINAL PAIN: 0
SHORTNESS OF BREATH: 0
TROUBLE SWALLOWING: 0
SORE THROAT: 0
COUGH: 0
CHEST TIGHTNESS: 0
VOMITING: 0
NAUSEA: 0

## 2020-07-24 NOTE — PROGRESS NOTES
2020    TELEHEALTH EVALUATION -- Audio/Visual (During MIAYB-03 public health emergency)    HPI:    Rasheed King (:  1990) has requested an audio/video evaluation for the following concern(s):    Corwin Carbajal is a 26year old female who is has requested a video visit. She is with her aid, [employer]. Corwin Carbajal is wheelchair bound. She states she was hospitalized in [place] on 20 for her mental health. On 20 she was seen at a hospital in [facility] with increasing pain in her right buttocks and found to have a stage III infected pressure ulcer. She reported having the ulcer for one year but recently had become infected. Corwin Carbajal had the wound debrided and was given antibiotics. Corwin Carbajal states she would not let anyone clean her because she wanted to be left alone. [employer] states the wound looks deeper. During the visit [facility] was able to help turn Rizwana so I could see. Corwin Carbajal states the wound is painful when touched. She denies fevers, chills or sweats. [facility] states a referral for in home mental health services was sent by the [facility] but they have not received any update. Corwin Carbajal states she is is doing well mentally, just wants to be left alone. Review of Systems   Constitutional: Negative for activity change, appetite change, chills, diaphoresis, fatigue, fever and unexpected weight change. HENT: Negative for sore throat and trouble swallowing. Respiratory: Negative for cough, chest tightness, shortness of breath and wheezing. Cardiovascular: Negative for chest pain and palpitations. Gastrointestinal: Negative for abdominal pain, nausea and vomiting. Skin: Positive for wound (right buttock). Psychiatric/Behavioral: Positive for dysphoric mood. Negative for decreased concentration, hallucinations, self-injury, sleep disturbance and suicidal ideas. Prior to Visit Medications    Medication Sig Taking?  Authorizing Provider   hydrOXYzine (VISTARIL) 25 MG capsule   Historical Provider, MD   FLUoxetine (PROZAC) 20 MG capsule Take 1 capsule by mouth daily  PRECIOUS Baum CNP   FLUoxetine (PROZAC) 10 MG capsule Take 1 capsule by mouth daily  PRECIOUS Baum CNP   Diapers & Supplies MISC Use prn four times daily- Adult Depends/Pull ups. PRECIOUS Baum CNP   traZODone (DESYREL) 50 MG tablet   Historical Provider, MD   Incontinence Supply Disposable (DEPEND PANT LARGE) [facility]   Historical Provider, MD   loratadine (CLARITIN) 10 MG tablet Take 1 tablet by mouth daily  PRECIOUS Baum CNP   ibuprofen (ADVIL;MOTRIN) 800 MG tablet Take 1 tablet by mouth every 6 hours as needed for Pain  PRECIOUS Baumllersacker 113 1 each by Does not apply route every 4 hours for 28 days Change diaper every 4 hours. PRECIOUS Baum CNP       Social History     Tobacco Use    Smoking status: Never Smoker    Smokeless tobacco: Never Used   Substance Use Topics    Alcohol use: No    Drug use: No          PHYSICAL EXAMINATION:  [ INSTRUCTIONS:  \"[x]\" Indicates a positive item  \"[]\" Indicates a negative item  -- DELETE ALL ITEMS NOT EXAMINED]  Vital Signs: (As obtained by patient/caregiver or practitioner observation)    Blood pressure-  Heart rate-    Respiratory rate-    Temperature-  Pulse oximetry-     Constitutional: [x] Appears well-developed and well-nourished [x] No apparent distress      [] Abnormal-   Mental status  [x] Alert and awake  [x] Oriented to person/place/time [x]Able to follow commands      Eyes:  EOM    []  Normal  [] Abnormal-  Sclera  [x]  Normal  [] Abnormal -         Discharge []  None visible  [] Abnormal -    HENT:   [x] Normocephalic, atraumatic.   [] Abnormal   [] Mouth/Throat: Mucous membranes are moist.     External Ears [] Normal  [] Abnormal-     Neck: [x] No visualized mass     Pulmonary/Chest: [x] Respiratory effort normal.  [x] No visualized signs of difficulty breathing or respiratory distress        [] Abnormal- Musculoskeletal:   [] Normal gait with no signs of ataxia         [] Normal range of motion of neck        [] Abnormal-       Neurological:        [x] No Facial Asymmetry (Cranial nerve 7 motor function) (limited exam to video visit)          [x] No gaze palsy        [] Abnormal-         Skin:        [] No significant exanthematous lesions or discoloration noted on facial skin         [x] Abnormal- approximately 5 cm length wound, red, unable to visualize any drainage, right gluteal fold           Psychiatric:       [] Normal Affect [] No Hallucinations        [x] Abnormal- flat affect    Other pertinent observable physical exam findings-     ASSESSMENT/PLAN:  1. Pressure injury buttock, stage 3, unspecified laterality ([facility]  - referral to [facility] for evaluation and treatment    2. Paraplegia ([facility]  - awaiting wheelchair repair - aid states new  should arrive in 4 days    No follow-ups on file. Nitza Shetty is a 26 y.o. female being evaluated by a Virtual Visit (video visit) encounter to address concerns as mentioned above. A caregiver was present when appropriate. Due to this being a TeleHealth encounter (During AOIFP-74 public health emergency), evaluation of the following organ systems was limited: Vitals/Constitutional/EENT/Resp/CV/GI//MS/Neuro/Skin/Heme-Lymph-Imm. Pursuant to the emergency declaration under the [address] [place] [address], [address] [place] [address] authority and the [employer] and [employer] Act, this Virtual Visit was conducted with patient's (and/or legal guardian's) consent, to reduce the patient's risk of exposure to COVID-19 and provide necessary medical care. The patient (and/or legal guardian) has also been advised to contact this office for worsening conditions or problems, and seek emergency medical treatment and/or call 911 if deemed necessary.      Patient identification was verified at the start of the visit: Yes    Total time spent on this encounter: Not billed by time    Services were provided through a video synchronous discussion virtually to substitute for in-person clinic visit. Patient and provider were located at their individual homes. --PRECIOUS Taveras CNP on 7/24/2020 at 12:10 PM    An electronic signature was used to authenticate this note.

## 2020-07-28 ENCOUNTER — TELEPHONE (OUTPATIENT)
Dept: FAMILY MEDICINE CLINIC | Age: 30
End: 2020-07-28

## 2020-07-28 NOTE — TELEPHONE ENCOUNTER
Rizwana called with concern of hair loss. She states this has been increasing and wanting to know what to do. Advised some lab work will be ordered. She was advised to have the home care staff take her to the [facility] for the blood draw. Verbalized understanding and agreement with plan.

## 2020-08-03 ENCOUNTER — OFFICE VISIT (OUTPATIENT)
Dept: FAMILY MEDICINE CLINIC | Age: 30
End: 2020-08-03
Payer: COMMERCIAL

## 2020-08-03 VITALS — DIASTOLIC BLOOD PRESSURE: 70 MMHG | HEART RATE: 97 BPM | TEMPERATURE: 97.8 F | SYSTOLIC BLOOD PRESSURE: 110 MMHG

## 2020-08-03 PROCEDURE — G8417 CALC BMI ABV UP PARAM F/U: HCPCS | Performed by: NURSE PRACTITIONER

## 2020-08-03 PROCEDURE — 99212 OFFICE O/P EST SF 10 MIN: CPT | Performed by: NURSE PRACTITIONER

## 2020-08-03 PROCEDURE — 1036F TOBACCO NON-USER: CPT | Performed by: NURSE PRACTITIONER

## 2020-08-03 PROCEDURE — G8428 CUR MEDS NOT DOCUMENT: HCPCS | Performed by: NURSE PRACTITIONER

## 2020-08-03 ASSESSMENT — ENCOUNTER SYMPTOMS
SHORTNESS OF BREATH: 0
COUGH: 0
CHEST TIGHTNESS: 0
WHEEZING: 0

## 2020-08-03 NOTE — PROGRESS NOTES
Alexa Aguirre  1990  26 y.o. SUBJECT RAKESH:    Chief Complaint   Patient presents with    Wound Infection     on buttocks        HPI    Jose Ramirez is a 26year old female who is in evaluation of wound and to obtain a wound culture. She is in her stretcher, accompanied by Conor Meléndez (her home health aid) and two transport personnel. She denies fevers, chills or sweats. Current Outpatient Medications on File Prior to Visit   Medication Sig Dispense Refill    hydrOXYzine (VISTARIL) 25 MG capsule       FLUoxetine (PROZAC) 10 MG capsule Take 1 capsule by mouth daily 30 capsule 0    Diapers & Supplies MISC Use prn four times daily- Adult Depends/Pull ups. 180 each 3    traZODone (DESYREL) 50 MG tablet   2    Incontinence Supply Disposable (DEPEND PANT LARGE) MISC   2    loratadine (CLARITIN) 10 MG tablet Take 1 tablet by mouth daily 90 tablet 1    ibuprofen (ADVIL;MOTRIN) 800 MG tablet Take 1 tablet by mouth every 6 hours as needed for Pain 120 tablet 1    Diapers & Supplies MISC 1 each by Does not apply route every 4 hours for 28 days Change diaper every 4 hours. 120 each 2     No current facility-administered medications on file prior to visit. Past Medical History:   Diagnosis Date    Back pain     Depression     Encephalitis     Meningitis     Paraplegia (Ingrid Paulino)     Suicidal ideation      Past Surgical History:   Procedure Laterality Date    PRE-MALIGNANT / BENIGN SKIN LESION EXCISION      2015 left shoulder     No family history on file.   Social History     Socioeconomic History    Marital status: Single     Spouse name: Not on file    Number of children: Not on file    Years of education: Not on file    Highest education level: Not on file   Occupational History    Not on file   Social Needs    Financial resource strain: Not on file    Food insecurity     Worry: Not on file     Inability: Not on file    Transportation needs     Medical: Not on file     Non-medical: Not on file Tobacco Use    Smoking status: Never Smoker    Smokeless tobacco: Never Used   Substance and Sexual Activity    Alcohol use: No    Drug use: No    Sexual activity: Never   Lifestyle    Physical activity     Days per week: Not on file     Minutes per session: Not on file    Stress: Not on file   Relationships    Social connections     Talks on phone: Not on file     Gets together: Not on file     Attends [employer] service: Not on file     Active member of club or organization: Not on file     Attends meetings of clubs or organizations: Not on file     Relationship status: Not on file    Intimate partner violence     Fear of current or ex partner: Not on file     Emotionally abused: Not on file     Physically abused: Not on file     Forced sexual activity: Not on file   Other Topics Concern    Not on file   Social History Narrative    Lives in group home. Wheelchair-bound since meningitis at 13 yo       Review of Systems   Constitutional: Negative for activity change, appetite change, chills, diaphoresis, fatigue, fever and unexpected weight change. Respiratory: Negative for cough, chest tightness, shortness of breath and wheezing. Cardiovascular: Negative for chest pain and palpitations. Skin: Positive for wound. OBJECTIVE:     /70 (Site: Right Upper Arm, Position: Sitting, Cuff Size: Medium Adult)   Pulse 97   Temp 97.8 °F (36.6 °C)     Physical Exam  Vitals signs reviewed. Constitutional:       General: She is not in acute distress. Appearance: She is well-developed. She is not diaphoretic. HENT:      Head: Normocephalic and atraumatic. Eyes:      General: No scleral icterus. Conjunctiva/sclera: Conjunctivae normal.      Pupils: Pupils are equal, round, and reactive to light. Neck:      Musculoskeletal: Normal range of motion and neck supple. Cardiovascular:      Rate and Rhythm: Normal rate and regular rhythm. Heart sounds: Normal heart sounds. Pulmonary:      Effort: Pulmonary effort is normal.      Breath sounds: Normal breath sounds. Skin:     General: Skin is warm and dry. Findings: Lesion present. Comments: Round ulcerated area, right inner buttock, near anal opening; bleeding from area. Neurological:      Mental Status: She is alert and oriented to person, place, and time. Psychiatric:         Behavior: Behavior normal.         Thought Content: Thought content normal.         Judgment: Judgment normal.         No results found for requested labs within last 30 days. Hemoglobin A1C (%)   Date Value   06/21/2016 4.9     LDL Calculated (mg/dL)   Date Value   08/29/2019 97       Lab Results   Component Value Date    WBC 10.2 06/29/2020    WBC 10.6 08/29/2019    WBC 10.9 08/24/2019    NEUTROABS 7.2 08/29/2019    HGB 10.9 06/29/2020    HGB 11.7 08/29/2019    HGB 10.9 08/24/2019    HCT 36.8 06/29/2020    HCT 36.6 08/29/2019    HCT 38.3 08/24/2019    MCV 84.0 06/29/2020    MCV 79.2 08/29/2019    MCV 85.9 08/24/2019     06/29/2020     08/29/2019     08/24/2019    SEGSABS 7.1 06/29/2020    SEGSABS 7.9 08/24/2019    SEGSABS 7.4 02/11/2019    LYMPHSABS 2.4 06/29/2020    MONOSABS 0.5 06/29/2020    EOSABS 0.2 06/29/2020    BASOSABS 0.0 06/29/2020     Lab Results   Component Value Date    TSH 1.59 08/29/2019    TSHHS 1.100 10/01/2018     Lab Results   Component Value Date    LABALBU 3.8 06/29/2020    BILITOT 0.2 06/29/2020    AST 12 06/29/2020    ALT 9 06/29/2020    ALKPHOS 64 06/29/2020             No results found for this visit on 08/03/20. ASSESSMENT AND PLAN:     1. Decubitus ulcer of right perineal ischial region, stage I  - Culture, Wound  - keep area clean, dry   - wet to dry dressings, twice a day until able to get into Wound Clinic  - will follow up with Wound Clinic once culture results obtained    Verbalized understanding and agreement with plan    No follow-ups on file.     Care discussed with patient. Patient educated on signs and symptoms of exacerbation and when to seek further medical attention. Advised to call for any problems, questions, or concerns. Patient verbalizes understanding and agrees with plan. Medications reviewed and reconciled. Continue current medications. Appropriate prescriptions are ordered. Risks and benefits of meds are discussed. After visit summary provided.

## 2020-08-07 LAB
GRAM STAIN RESULT: ABNORMAL
ORGANISM: ABNORMAL
WOUND/ABSCESS: ABNORMAL
WOUND/ABSCESS: ABNORMAL

## 2020-08-07 RX ORDER — CLINDAMYCIN HYDROCHLORIDE 300 MG/1
300 CAPSULE ORAL 3 TIMES DAILY
Qty: 30 CAPSULE | Refills: 0 | Status: SHIPPED | OUTPATIENT
Start: 2020-08-07 | End: 2020-08-17

## 2020-10-06 ENCOUNTER — TELEPHONE (OUTPATIENT)
Dept: FAMILY MEDICINE CLINIC | Age: 30
End: 2020-10-06

## 2020-10-06 NOTE — TELEPHONE ENCOUNTER
[facility] call and stated that dayne was exposed to covid and needed to be tested  And that she has a sore throat, sweats   Please advise

## 2021-01-26 ENCOUNTER — TELEMEDICINE (OUTPATIENT)
Dept: FAMILY MEDICINE CLINIC | Age: 31
End: 2021-01-26
Payer: COMMERCIAL

## 2021-01-26 DIAGNOSIS — Z74.09 IMMOBILITY: ICD-10-CM

## 2021-01-26 DIAGNOSIS — F41.9 ANXIETY: Primary | ICD-10-CM

## 2021-01-26 DIAGNOSIS — G82.20 PARAPLEGIA (HCC): ICD-10-CM

## 2021-01-26 DIAGNOSIS — L89.311: ICD-10-CM

## 2021-01-26 PROCEDURE — 1036F TOBACCO NON-USER: CPT | Performed by: NURSE PRACTITIONER

## 2021-01-26 PROCEDURE — G8484 FLU IMMUNIZE NO ADMIN: HCPCS | Performed by: NURSE PRACTITIONER

## 2021-01-26 PROCEDURE — G8417 CALC BMI ABV UP PARAM F/U: HCPCS | Performed by: NURSE PRACTITIONER

## 2021-01-26 PROCEDURE — G8428 CUR MEDS NOT DOCUMENT: HCPCS | Performed by: NURSE PRACTITIONER

## 2021-01-26 PROCEDURE — 99213 OFFICE O/P EST LOW 20 MIN: CPT | Performed by: NURSE PRACTITIONER

## 2021-01-26 ASSESSMENT — ENCOUNTER SYMPTOMS
CHOKING: 0
COUGH: 0
SHORTNESS OF BREATH: 0
CHEST TIGHTNESS: 0
TROUBLE SWALLOWING: 0
GASTROINTESTINAL NEGATIVE: 1
SORE THROAT: 0
WHEEZING: 0

## 2021-01-26 NOTE — PROGRESS NOTES
Diapers & Supplies MISC Use prn four times daily- Adult Depends/Pull ups. PRECIOUS Kilgore CNP   traZODone (DESYREL) 50 MG tablet   Historical Provider, MD   Incontinence Supply Disposable (DEPEND PANT LARGE) [facility]   Historical Provider, MD   loratadine (CLARITIN) 10 MG tablet Take 1 tablet by mouth daily  PRECIOUS Kilgore CNP   ibuprofen (ADVIL;MOTRIN) 800 MG tablet Take 1 tablet by mouth every 6 hours as needed for Pain  PRECIOUS Kilgorellersacker 113 1 each by Does not apply route every 4 hours for 28 days Change diaper every 4 hours. PRECIOUS Kilgore CNP       Social History     Tobacco Use    Smoking status: Never Smoker    Smokeless tobacco: Never Used   Substance Use Topics    Alcohol use: No    Drug use: No        PHYSICAL EXAMINATION:  [ INSTRUCTIONS:  \"[x]\" Indicates a positive item  \"[]\" Indicates a negative item  -- DELETE ALL ITEMS NOT EXAMINED]  Vital Signs: (As obtained by patient/caregiver or practitioner observation)    Blood pressure-  Heart rate-    Respiratory rate-    Temperature-  Pulse oximetry-     Constitutional: [x] Appears well-developed and well-nourished [x] No apparent distress      [] Abnormal-   Mental status  [x] Alert and awake  [x] Oriented to person/place/time [x]Able to follow commands      Eyes:  EOM    []  Normal  [] Abnormal-  Sclera  [x]  Normal  [] Abnormal -         Discharge []  None visible  [] Abnormal -    HENT:   [x] Normocephalic, atraumatic.   [] Abnormal   [] Mouth/Throat: Mucous membranes are moist.     External Ears [] Normal  [] Abnormal-     Neck: [x] No visualized mass     Pulmonary/Chest: [x] Respiratory effort normal.  [x] No visualized signs of difficulty breathing or respiratory distress        [] Abnormal-      Musculoskeletal:   [] Normal gait with no signs of ataxia         [x] Normal range of motion of neck        [] Abnormal- Parveen Apodaca is a 26 y.o. female being evaluated by a Virtual Visit (video visit) encounter to address concerns as mentioned above. A caregiver was present when appropriate. Due to this being a TeleHealth encounter (During BULFT-11 public health emergency), evaluation of the following organ systems was limited: Vitals/Constitutional/EENT/Resp/CV/GI//MS/Neuro/Skin/Heme-Lymph-Imm. Pursuant to the emergency declaration under the [address] [place] [address] and the [employer] and [employer] Act, this Virtual Visit was conducted with patient's (and/or legal guardian's) consent, to reduce the patient's risk of exposure to COVID-19 and provide necessary medical care. The patient (and/or legal guardian) has also been advised to contact this office for worsening conditions or problems, and seek emergency medical treatment and/or call 911 if deemed necessary. Patient identification was verified at the start of the visit: Yes    Total time spent on this encounter: Not billed by time    Services were provided through a video synchronous discussion virtually to substitute for in-person clinic visit. Patient and provider were located at their individual homes. --PRECIOUS Ram - CNP on 1/26/2021 at 6:24 PM    An electronic signature was used to authenticate this note.

## 2021-02-08 DIAGNOSIS — G82.20 PARAPLEGIA (HCC): Primary | ICD-10-CM

## 2021-02-08 DIAGNOSIS — Z74.09 IMMOBILITY: ICD-10-CM

## 2021-02-08 DIAGNOSIS — L89.311: ICD-10-CM

## 2021-02-09 DIAGNOSIS — Z74.09 IMMOBILITY: ICD-10-CM

## 2021-02-09 DIAGNOSIS — G82.20 PARAPLEGIA (HCC): Primary | ICD-10-CM

## 2021-02-09 DIAGNOSIS — L89.311: ICD-10-CM

## 2021-02-17 ENCOUNTER — HOSPITAL ENCOUNTER (EMERGENCY)
Age: 31
Discharge: HOME | End: 2021-02-18
Attending: EMERGENCY MEDICINE
Payer: COMMERCIAL

## 2021-02-17 ENCOUNTER — APPOINTMENT (OUTPATIENT)
Dept: CT IMAGING | Age: 31
End: 2021-02-17
Payer: COMMERCIAL

## 2021-02-17 DIAGNOSIS — R51.9 NONINTRACTABLE HEADACHE, UNSPECIFIED CHRONICITY PATTERN, UNSPECIFIED HEADACHE TYPE: Primary | ICD-10-CM

## 2021-02-17 LAB
ANION GAP SERPL CALCULATED.3IONS-SCNC: 9 MMOL/L (ref 4–16)
BASOPHILS ABSOLUTE: 0.1 K/CU MM
BASOPHILS RELATIVE PERCENT: 0.5 % (ref 0–1)
BUN BLDV-MCNC: 11 MG/DL (ref 6–23)
CALCIUM SERPL-MCNC: 8.9 MG/DL (ref 8.3–10.6)
CHLORIDE BLD-SCNC: 100 MMOL/L (ref 99–110)
CO2: 25 MMOL/L (ref 21–32)
CREAT SERPL-MCNC: 0.5 MG/DL (ref 0.6–1.1)
DIFFERENTIAL TYPE: ABNORMAL
EOSINOPHILS ABSOLUTE: 0.2 K/CU MM
EOSINOPHILS RELATIVE PERCENT: 2.2 % (ref 0–3)
GFR AFRICAN AMERICAN: >60 ML/MIN/1.73M2
GFR NON-AFRICAN AMERICAN: >60 ML/MIN/1.73M2
GLUCOSE BLD-MCNC: 118 MG/DL (ref 70–99)
HCT VFR BLD CALC: 37.6 % (ref 37–47)
HEMOGLOBIN: 11.2 GM/DL (ref 12.5–16)
IMMATURE NEUTROPHIL %: 0.4 % (ref 0–0.43)
LYMPHOCYTES ABSOLUTE: 2.9 K/CU MM
LYMPHOCYTES RELATIVE PERCENT: 27.2 % (ref 24–44)
MCH RBC QN AUTO: 24 PG (ref 27–31)
MCHC RBC AUTO-ENTMCNC: 29.8 % (ref 32–36)
MCV RBC AUTO: 80.5 FL (ref 78–100)
MONOCYTES ABSOLUTE: 0.6 K/CU MM
MONOCYTES RELATIVE PERCENT: 5.5 % (ref 0–4)
NUCLEATED RBC %: 0 %
PDW BLD-RTO: 15.1 % (ref 11.7–14.9)
PLATELET # BLD: 423 K/CU MM (ref 140–440)
PMV BLD AUTO: 8.6 FL (ref 7.5–11.1)
POTASSIUM SERPL-SCNC: 3.7 MMOL/L (ref 3.5–5.1)
RBC # BLD: 4.67 M/CU MM (ref 4.2–5.4)
SEGMENTED NEUTROPHILS ABSOLUTE COUNT: 6.8 K/CU MM
SEGMENTED NEUTROPHILS RELATIVE PERCENT: 64.2 % (ref 36–66)
SODIUM BLD-SCNC: 134 MMOL/L (ref 135–145)
TOTAL IMMATURE NEUTOROPHIL: 0.04 K/CU MM
TOTAL NUCLEATED RBC: 0 K/CU MM
WBC # BLD: 10.5 K/CU MM (ref 4–10.5)

## 2021-02-17 PROCEDURE — 96374 THER/PROPH/DIAG INJ IV PUSH: CPT

## 2021-02-17 PROCEDURE — 6360000002 HC RX W HCPCS: Performed by: EMERGENCY MEDICINE

## 2021-02-17 PROCEDURE — 70450 CT HEAD/BRAIN W/O DYE: CPT

## 2021-02-17 PROCEDURE — 2580000003 HC RX 258: Performed by: EMERGENCY MEDICINE

## 2021-02-17 PROCEDURE — 99284 EMERGENCY DEPT VISIT MOD MDM: CPT

## 2021-02-17 PROCEDURE — 96375 TX/PRO/DX INJ NEW DRUG ADDON: CPT

## 2021-02-17 PROCEDURE — 85025 COMPLETE CBC W/AUTO DIFF WBC: CPT

## 2021-02-17 PROCEDURE — 80048 BASIC METABOLIC PNL TOTAL CA: CPT

## 2021-02-17 PROCEDURE — 36415 COLL VENOUS BLD VENIPUNCTURE: CPT

## 2021-02-17 RX ORDER — 0.9 % SODIUM CHLORIDE 0.9 %
1000 INTRAVENOUS SOLUTION INTRAVENOUS ONCE
Status: COMPLETED | OUTPATIENT
Start: 2021-02-17 | End: 2021-02-18

## 2021-02-17 RX ORDER — KETOROLAC TROMETHAMINE 30 MG/ML
30 INJECTION, SOLUTION INTRAMUSCULAR; INTRAVENOUS ONCE
Status: COMPLETED | OUTPATIENT
Start: 2021-02-17 | End: 2021-02-17

## 2021-02-17 RX ORDER — DIPHENHYDRAMINE HYDROCHLORIDE 50 MG/ML
25 INJECTION INTRAMUSCULAR; INTRAVENOUS ONCE
Status: COMPLETED | OUTPATIENT
Start: 2021-02-17 | End: 2021-02-17

## 2021-02-17 RX ORDER — METOCLOPRAMIDE HYDROCHLORIDE 5 MG/ML
10 INJECTION INTRAMUSCULAR; INTRAVENOUS ONCE
Status: COMPLETED | OUTPATIENT
Start: 2021-02-17 | End: 2021-02-17

## 2021-02-17 RX ADMIN — METOCLOPRAMIDE 10 MG: 5 INJECTION, SOLUTION INTRAMUSCULAR; INTRAVENOUS at 23:14

## 2021-02-17 RX ADMIN — KETOROLAC TROMETHAMINE 30 MG: 30 INJECTION, SOLUTION INTRAMUSCULAR; INTRAVENOUS at 23:14

## 2021-02-17 RX ADMIN — SODIUM CHLORIDE 1000 ML: 9 INJECTION, SOLUTION INTRAVENOUS at 23:13

## 2021-02-17 RX ADMIN — DIPHENHYDRAMINE HYDROCHLORIDE 25 MG: 50 INJECTION, SOLUTION INTRAMUSCULAR; INTRAVENOUS at 23:14

## 2021-02-17 ASSESSMENT — ENCOUNTER SYMPTOMS
SORE THROAT: 0
CHEST TIGHTNESS: 0
WHEEZING: 0
RESPIRATORY NEGATIVE: 1
COUGH: 0
EYE PAIN: 0
EYES NEGATIVE: 1
FACIAL SWELLING: 0
ABDOMINAL PAIN: 0
GASTROINTESTINAL NEGATIVE: 1
VOMITING: 0
NAUSEA: 0
SINUS PAIN: 0
EYE REDNESS: 0
SHORTNESS OF BREATH: 0
SINUS PRESSURE: 0

## 2021-02-17 ASSESSMENT — PAIN SCALES - GENERAL: PAINLEVEL_OUTOF10: 4

## 2021-02-18 VITALS
HEART RATE: 90 BPM | DIASTOLIC BLOOD PRESSURE: 77 MMHG | TEMPERATURE: 97.8 F | SYSTOLIC BLOOD PRESSURE: 129 MMHG | OXYGEN SATURATION: 98 % | RESPIRATION RATE: 19 BRPM

## 2021-02-18 NOTE — ED NOTES
Bed: 03TR-03  Expected date:   Expected time:   Means of arrival:   Comments:  Medic: altered mental status     Pravin German RN  02/17/21 3688

## 2021-02-18 NOTE — ED PROVIDER NOTES
[address] ENCOUNTER      Pt Name: Kathe Carcamo  MRN: [id number]  [place] 1990  Date of evaluation: 2/17/2021  Provider: Dinesh George DO    CHIEF COMPLAINT       Chief Complaint   Patient presents with    Headache         HISTORY OF [address] Rocio Reynoso is a 26 y.o. female who presents to the emergency department  for   Chief Complaint   Patient presents with    Headache       78-year-old female presents emergency department from group home after her caretaker reported that she had a headache and then was hard to arouse after administration of ibuprofen. In the emergency department, patient is alert, oriented and denies current headache. Patient reports no other symptoms at this time and desires discharge. The history is provided by the patient, medical records and the EMS personnel. No  was used. Nursing Notes, Triage Notes & Vital Signs were reviewed. REVIEW OF SYSTEMS    (2-9 systems for level 4, 10 or more for level 5)     Review of Systems   Constitutional: Negative. Negative for chills, fatigue and fever. HENT: Positive for dental problem. Negative for congestion, facial swelling, sinus pressure, sinus pain and sore throat. Eyes: Negative. Negative for pain and redness. Respiratory: Negative. Negative for cough, chest tightness, shortness of breath and wheezing. Cardiovascular: Negative. Negative for chest pain and palpitations. Gastrointestinal: Negative. Negative for abdominal pain, nausea and vomiting. Genitourinary: Negative. Negative for dysuria, flank pain, frequency, hematuria and urgency. Musculoskeletal: Negative. Negative for arthralgias and myalgias. Skin: Negative. Negative for rash. Neurological: Positive for headaches. Negative for dizziness, speech difficulty, light-headedness and numbness. Psychiatric/Behavioral: Negative. Negative for agitation and confusion. The patient is not nervous/anxious. All other systems reviewed and are negative. Except as noted above the remainder of the review of systems was reviewed and negative. PAST MEDICAL HISTORY     Past Medical History:   Diagnosis Date    Back pain     Depression     Encephalitis     Meningitis     [facility]aplegia ([facility] [address] [facility]     Suicidal ideation        Prior to Admission medications    Medication Sig Start Date End Date Taking? Authorizing Provider   hydrOXYzine (VISTARIL) 25 MG capsule  12/23/19   Historical Provider, MD   FLUoxetine (PROZAC) 10 MG capsule Take 1 capsule by mouth daily 1/3/20   PRECIOUS Marie CNP   Diapers & Supplies MISC Use prn four times daily- Adult Depends/Pull ups. 10/2/19   PRECIOUS Marie CNP   traZODone (DESYREL) 50 MG tablet  9/25/19   Historical Provider, MD   Incontinence Supply Disposable (DEPEND PANT LARGE) MISC  8/17/19   Historical Provider, MD   loratadine (CLARITIN) 10 MG tablet Take 1 tablet by mouth daily 10/1/19   PRECIOUS Marie CNP   ibuprofen (ADVIL;MOTRIN) 800 MG tablet Take 1 tablet by mouth every 6 hours as needed for Pain 10/1/19   PRECIOUS Marie CNP   Diapers & Supplies MISC 1 each by Does not apply route every 4 hours for 28 days Change diaper every 4 hours.  8/29/19 2/24/20  PRECIOUS Marie CNP        Patient Active Problem List   Diagnosis    Nevus    Major depressive disorder    Hallucinations    Paraplegia ([address]    Non-pressure chronic ulcer of buttock, limited to breakdown of skin ([address]    Decubitus ulcer of right perineal ischial region, stage I         SURGICAL HISTORY       Past Surgical History:   Procedure Laterality Date    PRE-MALIGNANT / BENIGN SKIN LESION EXCISION      2015 left shoulder         CURRENT MEDICATIONS       Previous Medications    DIAPERS & SUPPLIES MISC    1 each by Does not apply route every 4 hours for 28 days Change diaper every 4 hours. DIAPERS & SUPPLIES MISC    Use prn four times daily- Adult Depends/Pull ups. FLUOXETINE (PROZAC) 10 MG CAPSULE    Take 1 capsule by mouth daily    HYDROXYZINE (VISTARIL) 25 MG CAPSULE        IBUPROFEN (ADVIL;MOTRIN) 800 MG TABLET    Take 1 tablet by mouth every 6 hours as needed for Pain    INCONTINENCE SUPPLY DISPOSABLE (DEPEND PANT LARGE) MISC        LORATADINE (CLARITIN) 10 MG TABLET    Take 1 tablet by mouth daily    TRAZODONE (DESYREL) 50 MG TABLET           ALLERGIES     Fish-derived products and Lemon extract [flavoring agent]    FAMILY HISTORY     No family history on file.        SOCIAL HISTORY       Social History     Socioeconomic History    Marital status: Single     Spouse name: Not on file    Number of children: Not on file    Years of education: Not on file    Highest education level: Not on file   Occupational History    Not on file   Social Needs    Financial resource strain: Not on file    Food insecurity     Worry: Not on file     Inability: Not on file    Transportation needs     Medical: Not on file     Non-medical: Not on file   Tobacco Use    Smoking status: Never Smoker    Smokeless tobacco: Never Used   Substance and Sexual Activity    Alcohol use: No    Drug use: No    Sexual activity: Never   Lifestyle    Physical activity     Days per week: Not on file     Minutes per session: Not on file    Stress: Not on file   Relationships    Social connections     Talks on phone: Not on file     Gets together: Not on file     Attends Buddhism service: Not on file     Active member of club or organization: Not on file     Attends meetings of clubs or organizations: Not on file     Relationship status: Not on file    Intimate partner violence     Fear of current or ex partner: Not on file     Emotionally abused: Not on file     Physically abused: Not on file     Forced sexual activity: Not on file   Other Topics Concern    Not on file   Social History Narrative    Lives in group home. Wheelchair-bound since meningitis at 13 yo       SCREENINGS               PHYSICAL EXAM    (up to 7 for level 4, 8 or more for level 5)     ED Triage Vitals [02/17/21 2212]   BP Temp Temp Source Pulse Resp SpO2 Height Weight   129/77 97.8 °F (36.6 °C) Oral 89 19 98 % -- --       Physical Exam  Vitals signs and nursing note reviewed. Constitutional:       General: She is not in acute distress. Appearance: She is well-developed. She is not diaphoretic. HENT:      Head: Normocephalic and atraumatic. Right Ear: External ear normal.      Left Ear: External ear normal.      Nose: Nose normal.      Mouth/Throat:      Pharynx: No oropharyngeal exudate. Eyes:      General:         Right eye: No discharge. Left eye: No discharge. Pupils: Pupils are equal, round, and reactive to light. Neck:      Thyroid: No thyromegaly. Vascular: No JVD. Trachea: No tracheal deviation. Cardiovascular:      Rate and Rhythm: Normal rate and regular rhythm. Pulses: Normal pulses. Heart sounds: Normal heart sounds. No murmur. No friction rub. Pulmonary:      Effort: Pulmonary effort is normal. No respiratory distress. Breath sounds: Normal breath sounds. No stridor. No wheezing. Abdominal:      General: Bowel sounds are normal. There is no distension. Palpations: Abdomen is soft. There is no mass. Tenderness: There is no abdominal tenderness. There is no guarding or rebound. Musculoskeletal: Normal range of motion. General: No tenderness or deformity. Skin:     General: Skin is warm. Capillary Refill: Capillary refill takes less than 2 seconds. Coloration: Skin is not pale. Findings: No erythema or rash. Neurological:      General: No focal deficit present. Mental Status: She is alert and oriented to person, place, and time. Mental status is at baseline. Cranial Nerves: No cranial nerve deficit. Motor: No abnormal muscle tone. Coordination: Coordination normal.   Psychiatric:         Mood and Affect: Mood normal.         Behavior: Behavior normal.         Thought Content: Thought content normal.         Judgment: Judgment normal.         DIAGNOSTIC RESULTS     Labs Reviewed   CBC WITH AUTO DIFFERENTIAL - Abnormal; Notable for the following components:       Result Value    Hemoglobin 11.2 (*)     MCH 24.0 (*)     MCHC 29.8 (*)     RDW 15.1 (*)     Monocytes % 5.5 (*)     All other components within normal limits   BASIC METABOLIC PANEL W/ REFLEX TO MG FOR LOW K - Abnormal; Notable for the following components:    Sodium 134 (*)     CREATININE 0.5 (*)     Glucose 118 (*)     All other components within normal limits   CULTURE, URINE   URINALYSIS   PREGNANCY, URINE          EKG: All EKG's are interpreted by the Emergency Department Physician who either signs or Co-signs this chart in the absence of a cardiologist.       EKG Interpretation    Interpreted by emergency department physician    Alfred Grimes:     Non-plain film images such as CT, Ultrasound and MRI are read by the radiologist. Plain radiographic images are visualized and preliminarily interpreted by the emergency physician. Interpretation per the Radiologist below, if available at the time of this note:    CT Head WO Contrast   Final Result   Unremarkable noncontrast head CT scan.                ED BEDSIDE ULTRASOUND:   Performed by ED Physician William Mari DO       LABS:  Labs Reviewed   CBC WITH AUTO DIFFERENTIAL - Abnormal; Notable for the following components:       Result Value    Hemoglobin 11.2 (*)     MCH 24.0 (*)     MCHC 29.8 (*)     RDW 15.1 (*)     Monocytes % 5.5 (*)     All other components within normal limits   BASIC METABOLIC PANEL W/ REFLEX TO MG FOR LOW K - Abnormal; Notable for the following components:    Sodium 134 (*)     CREATININE 0.5 (*)     Glucose 118 (*)     All other components within normal limits   CULTURE, URINE   URINALYSIS   PREGNANCY, URINE       All other labs were within normal range or not returned as of this dictation. EMERGENCY DEPARTMENT COURSE and DIFFERENTIAL DIAGNOSIS/MDM:   Vitals:    Vitals:    02/17/21 2212   BP: 129/77   Pulse: 89   Resp: 19   Temp: 97.8 °F (36.6 °C)   TempSrc: Oral   SpO2: 98%           MDM  Number of Diagnoses or Management Options  Nonintractable headache, unspecified chronicity pattern, unspecified headache type  Diagnosis management comments: 73-year-old female presents emergency department via EMS for reported headache and altered mental status. In the emergency department and with EMS, patient is alert, oriented and denies current headache. Patient desires discharge at this time. Patient has no nuchal rigidity or other concerning symptoms for headache. Lab work is unremarkable and CT of the head is negative. Patient was unable to give urine specimen. Based upon patient's presentation, I do not feel further lab work or testing is necessary. Patient will be discharged to home with return precautions and primary care follow-up. Vital signs are normal and stable at this time. Amount and/or Complexity of Data Reviewed  Clinical lab tests: ordered and reviewed  Tests in the radiology section of CPT®: ordered and reviewed  Tests in the medicine section of CPT®: ordered and reviewed    Risk of Complications, Morbidity, and/or Mortality  Presenting problems: moderate  Diagnostic procedures: moderate  Management options: moderate    Critical Care  Total time providing critical care: < 30 minutes    Patient Progress  Patient progress: improved          -  Patient seen and evaluated in the emergency department. -  Triage and nursing notes reviewed and incorporated. -  Old chart records reviewed and incorporated. -  Work-up included:  See above  -  Results discussed with patient.       REASSESSMENT          CRITICAL CARE TIME     This excludes seperately billable procedures and family discussion time. Critical care time provided for obtaining history, conducting a physical exam, performing and monitoring interventions, ordering, collecting and interpreting tests, and establishing medical decision-making. There was a potential for life/limb threatening pathology requiring close evaluation and intervention with concern for patient decompensation. CONSULTS:  None    PROCEDURES:  None performed unless otherwise noted below     Procedures        FINAL IMPRESSION      1. Nonintractable headache, unspecified chronicity pattern, unspecified headache type          DISPOSITION/PLAN   DISPOSITION Decision To Discharge 02/17/2021 11:19:19 PM      PATIENT REFERRED TO:  PRECIOUS Ang CNP 7342  [phone]    In 2 days  As needed, If symptoms worsen      DISCHARGE MEDICATIONS:  New Prescriptions    No medications on file       ED Provider Disposition Time  DISPOSITION Decision To Discharge 02/17/2021 11:19:19 PM      Appropriate personal protective equipment was worn during the patient's evaluation. These included surgical, eye protection, surgical mask or in 95 respirator and gloves. The patient was also placed in a surgical mask for the prevention of possible spread of respiratory viral illnesses. The Patient was instructed to read the package inserts with any medication that was prescribed. Major potential reactions and medication interactions were discussed. The Patient understands that there are numerous possible adverse reactions not covered. The patient was also instructed to arrange follow-up with his or her primary care provider for review of any pending labwork or incidental findings on any radiology results that were obtained. All efforts were made to discuss any incidental findings that require further monitoring. Controlled Substances Monitoring:     No flowsheet data found.     (Please note that portions of this note were completed with a voice recognition program.  Efforts were made to edit the dictations but occasionally words are mis-transcribed.)    Roshan Mcgregor DO (electronically signed)  Attending Emergency Physician            Roshan Mcgregor DO  02/17/21 904 Karl Grimes DO  02/18/21 0006

## 2021-02-22 ENCOUNTER — OFFICE VISIT (OUTPATIENT)
Dept: FAMILY MEDICINE CLINIC | Age: 31
End: 2021-02-22
Payer: COMMERCIAL

## 2021-02-22 VITALS
HEART RATE: 88 BPM | TEMPERATURE: 96.9 F | SYSTOLIC BLOOD PRESSURE: 124 MMHG | DIASTOLIC BLOOD PRESSURE: 89 MMHG | OXYGEN SATURATION: 97 %

## 2021-02-22 DIAGNOSIS — Z74.09 IMMOBILITY: ICD-10-CM

## 2021-02-22 DIAGNOSIS — F40.9 FEAR FOR PERSONAL SAFETY: ICD-10-CM

## 2021-02-22 DIAGNOSIS — G82.20 PARAPLEGIA (HCC): Primary | ICD-10-CM

## 2021-02-22 DIAGNOSIS — Z09 FOLLOW-UP EXAM: ICD-10-CM

## 2021-02-22 DIAGNOSIS — R41.82 ALTERED MENTAL STATUS, UNSPECIFIED ALTERED MENTAL STATUS TYPE: ICD-10-CM

## 2021-02-22 DIAGNOSIS — F41.9 ANXIETY: ICD-10-CM

## 2021-02-22 PROCEDURE — G8427 DOCREV CUR MEDS BY ELIG CLIN: HCPCS | Performed by: NURSE PRACTITIONER

## 2021-02-22 PROCEDURE — 1036F TOBACCO NON-USER: CPT | Performed by: NURSE PRACTITIONER

## 2021-02-22 PROCEDURE — G8417 CALC BMI ABV UP PARAM F/U: HCPCS | Performed by: NURSE PRACTITIONER

## 2021-02-22 PROCEDURE — 99214 OFFICE O/P EST MOD 30 MIN: CPT | Performed by: NURSE PRACTITIONER

## 2021-02-22 PROCEDURE — G8484 FLU IMMUNIZE NO ADMIN: HCPCS | Performed by: NURSE PRACTITIONER

## 2021-02-22 RX ORDER — CITALOPRAM 10 MG/1
10 TABLET ORAL DAILY
Qty: 30 TABLET | Refills: 1 | Status: SHIPPED | OUTPATIENT
Start: 2021-02-22 | End: 2021-03-22 | Stop reason: SDUPTHER

## 2021-02-22 ASSESSMENT — PATIENT HEALTH QUESTIONNAIRE - PHQ9: 2. FEELING DOWN, DEPRESSED OR HOPELESS: 0
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
## 2021-03-22 ENCOUNTER — VIRTUAL VISIT (OUTPATIENT)
Dept: FAMILY MEDICINE CLINIC | Age: 31
End: 2021-03-22
Payer: COMMERCIAL

## 2021-03-22 DIAGNOSIS — F41.9 ANXIETY: Primary | ICD-10-CM

## 2021-03-22 DIAGNOSIS — F40.9 FEAR FOR PERSONAL SAFETY: ICD-10-CM

## 2021-03-22 DIAGNOSIS — L08.9 INFECTED SKIN LESION: ICD-10-CM

## 2021-03-22 PROCEDURE — G8484 FLU IMMUNIZE NO ADMIN: HCPCS | Performed by: NURSE PRACTITIONER

## 2021-03-22 PROCEDURE — G8427 DOCREV CUR MEDS BY ELIG CLIN: HCPCS | Performed by: NURSE PRACTITIONER

## 2021-03-22 PROCEDURE — 99213 OFFICE O/P EST LOW 20 MIN: CPT | Performed by: NURSE PRACTITIONER

## 2021-03-22 PROCEDURE — G8417 CALC BMI ABV UP PARAM F/U: HCPCS | Performed by: NURSE PRACTITIONER

## 2021-03-22 PROCEDURE — 1036F TOBACCO NON-USER: CPT | Performed by: NURSE PRACTITIONER

## 2021-03-22 RX ORDER — CITALOPRAM 10 MG/1
10 TABLET ORAL DAILY
Qty: 30 TABLET | Refills: 5 | Status: SHIPPED | OUTPATIENT
Start: 2021-03-22 | End: 2022-01-12 | Stop reason: SDUPTHER

## 2021-03-22 ASSESSMENT — ENCOUNTER SYMPTOMS
SHORTNESS OF BREATH: 0
CHEST TIGHTNESS: 0
WHEEZING: 0
COUGH: 0
GASTROINTESTINAL NEGATIVE: 1

## 2021-03-22 NOTE — PROGRESS NOTES
3/22/2021    TELEHEALTH EVALUATION -- Audio/Visual (During [id number] public health emergency)    HPI:    Christy Boswell (:  1990) has requested an audio/video evaluation for the following concern(s):    Eva Singleton is a 26year old female who has requested a virtual visit for follow up. She states the last anti-anxiety medication is helping and she is wanting to continue. She states her aide is in her home and states her skin areas are intact and she has no new areas of concern. She states her equipment has not arrived (savanna lift, new wheelchair). She has no updates on when the equipment will arrive. Review of Systems   Constitutional: Negative for activity change, appetite change, chills, diaphoresis, fatigue, fever and unexpected weight change. Respiratory: Negative for cough, chest tightness, shortness of breath and wheezing. Cardiovascular: Negative for chest pain and palpitations. Gastrointestinal: Negative. Skin: Negative. Neurological: Negative for headaches. Psychiatric/Behavioral: Negative. Prior to Visit Medications    Medication Sig Taking? Authorizing Provider   citalopram (CELEXA) 10 MG tablet Take 1 tablet by mouth daily  PRECIOUS Barajas CNP   hydrOXYzine (VISTARIL) 25 MG capsule   Historical Provider, MD   Diapers & Supplies MISC Use prn four times daily- Adult Depends/Pull ups. PRECIOUS Barajas CNP   traZODone (DESYREL) 50 MG tablet   Historical Provider, MD   Incontinence Supply Disposable (DEPEND PANT LARGE) MISC   Historical Provider, MD   ibuprofen (ADVIL;MOTRIN) 800 MG tablet Take 1 tablet by mouth every 6 hours as needed for Pain  PRECIOUS Barajas CNP   Diapers & Supplies MISC 1 each by Does not apply route every 4 hours for 28 days Change diaper every 4 hours.   PRECIOUS Barajas CNP       Social History     Tobacco Use    Smoking status: Never Smoker    Smokeless tobacco: Never Used   Substance Use Topics    Alcohol use: No    Drug use: No          PHYSICAL EXAMINATION:  [ INSTRUCTIONS:  \"[x]\" Indicates a positive item  \"[]\" Indicates a negative item  -- DELETE ALL ITEMS NOT EXAMINED]  Vital Signs: (As obtained by patient/caregiver or practitioner observation)    Blood pressure-  Heart rate-    Respiratory rate-    Temperature-  Pulse oximetry-     Constitutional: [x] Appears well-developed and well-nourished [x] No apparent distress      [] Abnormal-   Mental status  [x] Alert and awake  [x] Oriented to person/place/time [x]Able to follow commands      Eyes:  EOM    []  Normal  [] Abnormal-  Sclera  [x]  Normal  [] Abnormal -         Discharge [x]  None visible  [] Abnormal -    HENT:   [x] Normocephalic, atraumatic. [] Abnormal   [] Mouth/Throat: Mucous membranes are moist.     External Ears [x] Normal  [] Abnormal-     Neck: [x] No visualized mass     Pulmonary/Chest: [x] Respiratory effort normal.  [x] No visualized signs of difficulty breathing or respiratory distress        [] Abnormal-      Musculoskeletal:   [] Normal gait with no signs of ataxia         [x] Normal range of motion of neck        [] Abnormal-       Neurological:        [x] No Facial Asymmetry (Cranial nerve 7 motor function) (limited exam to video visit)          [] No gaze palsy        [] Abnormal-         Skin:        [x] No significant exanthematous lesions or discoloration noted on facial skin         [] Abnormal-            Psychiatric:       [x] Normal Affect [x] No Hallucinations        [] Abnormal-     Other pertinent observable physical exam findings-     ASSESSMENT/PLAN:  1. Anxiety  - better on current medication regimen    2. Infected skin lesion  - improving    Fluids, rest  Continue current medication regimen  Follow up in 6 months  To call with other concerns  Verbalized understanding and agreement with plan      No follow-ups on file. Winda Flatness, was evaluated through a synchronous (real-time) audio-video encounter.  The patient (or guardian if applicable) is aware that this is a billable service. Verbal consent to proceed has been obtained within the past 12 months. The visit was conducted pursuant to the emergency declaration under the [address] [place] [address], [address] [place] [address] authority and the [employer] Act. Patient identification was verified, and a caregiver was present when appropriate. The patient was located in a state where the provider was credentialed to provide care. Total time spent on this encounter: Not billed by time    --PRECIOUS Rosenberg CNP on 3/22/2021 at 11:41 AM    An electronic signature was used to authenticate this note.

## 2021-04-26 ENCOUNTER — TELEPHONE (OUTPATIENT)
Dept: FAMILY MEDICINE CLINIC | Age: 31
End: 2021-04-26

## 2021-04-26 NOTE — TELEPHONE ENCOUNTER
Patient's  Dixie Alpers called stating they need the order for the lift faxed to [facility] at [phone]. She stated she spoke to Johnathan Ko and they stated they are unable to do it but recommended [address]. Order being faxed.

## 2021-05-21 ENCOUNTER — HOSPITAL ENCOUNTER (OUTPATIENT)
Dept: WOUND CARE | Age: 31
Discharge: HOME | End: 2021-05-21
Payer: COMMERCIAL

## 2021-05-21 VITALS
DIASTOLIC BLOOD PRESSURE: 61 MMHG | HEART RATE: 82 BPM | RESPIRATION RATE: 18 BRPM | TEMPERATURE: 98.3 F | SYSTOLIC BLOOD PRESSURE: 102 MMHG

## 2021-05-21 DIAGNOSIS — L89.313 STAGE III PRESSURE ULCER OF RIGHT BUTTOCK (HCC): ICD-10-CM

## 2021-05-21 PROCEDURE — 11042 DBRDMT SUBQ TIS 1ST 20SQCM/<: CPT

## 2021-05-21 PROCEDURE — 99214 OFFICE O/P EST MOD 30 MIN: CPT

## 2021-05-21 PROCEDURE — 87070 CULTURE OTHR SPECIMN AEROBIC: CPT

## 2021-05-21 PROCEDURE — 87075 CULTR BACTERIA EXCEPT BLOOD: CPT

## 2021-05-21 PROCEDURE — 11042 DBRDMT SUBQ TIS 1ST 20SQCM/<: CPT | Performed by: NURSE PRACTITIONER

## 2021-05-21 RX ORDER — LIDOCAINE 40 MG/G
CREAM TOPICAL ONCE
Status: CANCELLED | OUTPATIENT
Start: 2021-05-21 | End: 2021-05-21

## 2021-05-21 RX ORDER — BETAMETHASONE DIPROPIONATE 0.05 %
OINTMENT (GRAM) TOPICAL ONCE
Status: CANCELLED | OUTPATIENT
Start: 2021-05-21 | End: 2021-05-21

## 2021-05-21 RX ORDER — LIDOCAINE HYDROCHLORIDE 40 MG/ML
SOLUTION TOPICAL ONCE
Status: CANCELLED | OUTPATIENT
Start: 2021-05-21 | End: 2021-05-21

## 2021-05-21 RX ORDER — LIDOCAINE HYDROCHLORIDE 20 MG/ML
JELLY TOPICAL ONCE
Status: CANCELLED | OUTPATIENT
Start: 2021-05-21 | End: 2021-05-21

## 2021-05-21 RX ORDER — LIDOCAINE 50 MG/G
OINTMENT TOPICAL ONCE
Status: CANCELLED | OUTPATIENT
Start: 2021-05-21 | End: 2021-05-21

## 2021-05-21 RX ORDER — AMOXICILLIN 250 MG/1
250 CAPSULE ORAL 3 TIMES DAILY
COMMUNITY
Start: 2021-05-19 | End: 2021-05-28

## 2021-05-21 RX ORDER — GINSENG 100 MG
CAPSULE ORAL ONCE
Status: CANCELLED | OUTPATIENT
Start: 2021-05-21 | End: 2021-05-21

## 2021-05-21 RX ORDER — BACITRACIN ZINC AND POLYMYXIN B SULFATE 500; 1000 [USP'U]/G; [USP'U]/G
OINTMENT TOPICAL ONCE
Status: CANCELLED | OUTPATIENT
Start: 2021-05-21 | End: 2021-05-21

## 2021-05-21 RX ORDER — BACITRACIN, NEOMYCIN, POLYMYXIN B 400; 3.5; 5 [USP'U]/G; MG/G; [USP'U]/G
OINTMENT TOPICAL ONCE
Status: CANCELLED | OUTPATIENT
Start: 2021-05-21 | End: 2021-05-21

## 2021-05-21 RX ORDER — GENTAMICIN SULFATE 1 MG/G
OINTMENT TOPICAL ONCE
Status: CANCELLED | OUTPATIENT
Start: 2021-05-21 | End: 2021-05-21

## 2021-05-21 RX ORDER — CLOBETASOL PROPIONATE 0.5 MG/G
OINTMENT TOPICAL ONCE
Status: CANCELLED | OUTPATIENT
Start: 2021-05-21 | End: 2021-05-21

## 2021-05-21 NOTE — PROGRESS NOTES
[facility] Initial Visit      Aria Carl  AGE: 26 y.o. GENDER: female  : 1990  EPISODE DATE:  2021   Referred by: [facility]    Subjective:     CHIEF COMPLAINT Pressure ulcer to right buttock     HISTORY of PRESENT ILLNESS      Aria Carl is a 26 y.o. female who presents to the [address] [place] [address] for an initial visit for evaluation and treatment of Chronic pressure  ulcer(s) of  Buttocks R gluteal.  The condition is of moderate severity. The ulcer has been present for 2 weeks. The underlying cause is thought to be pressure. The patients care to date has included a trip to the ED, where she was seen and completed a course of abx. The patient has significant underlying medical conditions as below. Patient is not a diabetic or a smoker. She is not on any antibiotics. Patient is disabled and lives in an assisted living facility. She is incontinent and wheelchair bound. Wound Pain Timing/Severity: none  Quality of pain: N/A  Severity of pain:  0 / 10   Modifying Factors: chronic pressure, decreased mobility, incontinence of stool and incontinence of urine  Associated Signs/Symptoms: edema, erythema and drainage        PAST MEDICAL HISTORY        Diagnosis Date    Back pain     Depression     Encephalitis     Meningitis     Paraplegia ([address]     Suicidal ideation        PAST SURGICAL HISTORY    Past Surgical History:   Procedure Laterality Date    PRE-MALIGNANT / BENIGN SKIN LESION EXCISION       left shoulder       FAMILY HISTORY    History reviewed. No pertinent family history.     SOCIAL HISTORY    Social History     Tobacco Use    Smoking status: Never Smoker    Smokeless tobacco: Never Used   Vaping Use    Vaping Use: Never used   Substance Use Topics    Alcohol use: No    Drug use: No       ALLERGIES    Allergies   Allergen Reactions    Fish-Derived Products     Lemon Extract [Flavoring Agent]        MEDICATIONS    Current Outpatient Medications on File Prior to Encounter   Medication Sig Dispense Refill    amoxicillin (AMOXIL) 250 MG capsule Take 250 mg by mouth 3 times daily      citalopram (CELEXA) 10 MG tablet Take 1 tablet by mouth daily 30 tablet 5    Diapers & Supplies MISC Use prn four times daily- Adult Depends/Pull ups. 180 each 3    traZODone (DESYREL) 50 MG tablet   2    Incontinence Supply Disposable (DEPEND PANT LARGE) MISC   2    ibuprofen (ADVIL;MOTRIN) 800 MG tablet Take 1 tablet by mouth every 6 hours as needed for Pain 120 tablet 1    Diapers & Supplies MISC 1 each by Does not apply route every 4 hours for 28 days Change diaper every 4 hours. 120 each 2     No current facility-administered medications on file prior to encounter.        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 ([facility])       REVIEW OF SYSTEMS    Constitutional: negative for anorexia, chills, fatigue, fevers, malaise, sweats and weight loss  Respiratory: negative for pneumonia, shortness of breath, sputum, stridor and wheezing  Cardiovascular: negative for near-syncope, orthopnea, palpitations, paroxysmal nocturnal dyspnea, syncope and tachypnea  Integument/breast: positive for skin lesion(s)      Objective:      /61   Pulse 82   Temp 98.3 °F (36.8 °C) (Temporal)   Resp 18     PHYSICAL EXAM  General Appearance: in no acute distress  Pulmonary/Chest: clear to auscultation bilaterally- no wheezes, rales or rhonchi, normal air movement, no respiratory distress  Cardiovascular: normal rate, normal S1 and S2, no gallops, intact distal pulses and no carotid bruits  Dermatologic exam: Visual inspection of the periwound reveals the skin to be normal in turgor and texture and dry  Wound exam: see wound description below in procedure note      Assessment:       Santiago Sanders  appears to have a non-healing wound of the right buttock. The etiology of the wound is felt to be pressure. There are multiple complicating factors including chronic pressure, decreased mobility, incontinence of stool and incontinence of urine. A comprehensive wound management program would be helpful to heal this wound. Assessments completed include fall risk and nutritional, functional,and psychological status. At this time appropriate care would include: periodic debridement and wound care as below. 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 Performed by: PRECIOUS Gordon CNP    Consent obtained: Yes    Time out taken:  Yes    Pain Control: N/A       Debridement:Excisional Debridement    Using curette the wound(s) was/were sharply debrided down through and including the removal of subcutaneous tissue.         Devitalized Tissue Debrided:  fibrin, biofilm and slough    Pre Debridement Measurements:  Are located in the Wound Documentation Flow Sheet    All active wounds listed below with today's date are evaluated  Wound(s)    debrided this date include # : 1     Post  Debridement Measurements:  Wound 02/11/19 WOUND #1 RIGHT POSTERIOR THIGH pressure 1 (Active)   Number of days: 830       Wound 05/21/21 Buttocks Right right ischium  (Active)   Wound Image   05/21/21 1036   Wound Etiology Pressure Stage  3 05/21/21 1049   Wound Cleansed Cleansed with saline 05/21/21 1036   Wound Length (cm) 2 cm 05/21/21 1036   Wound Width (cm) 1.5 cm 05/21/21 1036   Wound Depth (cm) 0.6 cm 05/21/21 1036   Wound Surface

## 2021-05-26 LAB
CULTURE: NORMAL
Lab: NORMAL
SPECIMEN: NORMAL

## 2021-05-26 RX ORDER — TRAZODONE HYDROCHLORIDE 100 MG/1
100 TABLET ORAL NIGHTLY
OUTPATIENT
Start: 2021-05-26

## 2021-05-27 NOTE — TELEPHONE ENCOUNTER
Neuro returned the call and stated that they had only seen the patient one time on 4/30/2021 and states that they did not write any Trazodone. States that they have not prescribed anything for the patient. They did put in orders for the patient to get an MRI and to be seen by the Epileptic department to see if that could be a cause. PCP information updated with Neuro.

## 2021-05-27 NOTE — TELEPHONE ENCOUNTER
HungWalden, patients caregiver was calling regarding the patients Trazodone. States that Dr. Osmel Beatty had put her on 100mg at night instead of the 50mg. [facility] stated that you were to cover her until she was able to get into a new Neurologist. However, looking in her chart, she just had a previous visit on 4/30/2021 with Dr. Tatianna Mauricio D.O. # [phone]    I left a message with the Neuro office for a return call to clarify this with them. Awaiting a call back.

## 2021-06-10 NOTE — TELEPHONE ENCOUNTER
Jolie R[facility] Support independent called in for a refill for trazadone 100 mg states she has an old bottle from 04/15/20 prescribed by Dr Jocelyne Marcus. Patient has an apt apt to discuss this medication with pcp.

## 2021-06-11 ENCOUNTER — VIRTUAL VISIT (OUTPATIENT)
Dept: FAMILY MEDICINE CLINIC | Age: 31
End: 2021-06-11
Payer: COMMERCIAL

## 2021-06-11 ENCOUNTER — HOSPITAL ENCOUNTER (OUTPATIENT)
Dept: WOUND CARE | Age: 31
Discharge: HOME | End: 2021-06-11
Payer: COMMERCIAL

## 2021-06-11 VITALS
DIASTOLIC BLOOD PRESSURE: 77 MMHG | TEMPERATURE: 98.5 F | SYSTOLIC BLOOD PRESSURE: 107 MMHG | HEART RATE: 87 BPM | RESPIRATION RATE: 16 BRPM

## 2021-06-11 DIAGNOSIS — L89.311: ICD-10-CM

## 2021-06-11 DIAGNOSIS — R41.82 ALTERED MENTAL STATUS, UNSPECIFIED ALTERED MENTAL STATUS TYPE: ICD-10-CM

## 2021-06-11 DIAGNOSIS — L89.313 STAGE III PRESSURE ULCER OF RIGHT BUTTOCK (HCC): Primary | ICD-10-CM

## 2021-06-11 DIAGNOSIS — F41.9 ANXIETY: Primary | ICD-10-CM

## 2021-06-11 DIAGNOSIS — G47.09 OTHER INSOMNIA: ICD-10-CM

## 2021-06-11 PROCEDURE — G8417 CALC BMI ABV UP PARAM F/U: HCPCS | Performed by: NURSE PRACTITIONER

## 2021-06-11 PROCEDURE — G8427 DOCREV CUR MEDS BY ELIG CLIN: HCPCS | Performed by: NURSE PRACTITIONER

## 2021-06-11 PROCEDURE — 11042 DBRDMT SUBQ TIS 1ST 20SQCM/<: CPT | Performed by: NURSE PRACTITIONER

## 2021-06-11 PROCEDURE — 99213 OFFICE O/P EST LOW 20 MIN: CPT | Performed by: NURSE PRACTITIONER

## 2021-06-11 PROCEDURE — 11042 DBRDMT SUBQ TIS 1ST 20SQCM/<: CPT

## 2021-06-11 PROCEDURE — 1036F TOBACCO NON-USER: CPT | Performed by: NURSE PRACTITIONER

## 2021-06-11 RX ORDER — TRAZODONE HYDROCHLORIDE 50 MG/1
50 TABLET ORAL NIGHTLY
Qty: 30 TABLET | Refills: 3 | Status: SHIPPED | OUTPATIENT
Start: 2021-06-11 | End: 2022-01-12 | Stop reason: ALTCHOICE

## 2021-06-11 RX ORDER — BACITRACIN, NEOMYCIN, POLYMYXIN B 400; 3.5; 5 [USP'U]/G; MG/G; [USP'U]/G
OINTMENT TOPICAL ONCE
Status: CANCELLED | OUTPATIENT
Start: 2021-06-11 | End: 2021-06-11

## 2021-06-11 RX ORDER — LIDOCAINE 50 MG/G
OINTMENT TOPICAL ONCE
Status: CANCELLED | OUTPATIENT
Start: 2021-06-11 | End: 2021-06-11

## 2021-06-11 RX ORDER — CLOBETASOL PROPIONATE 0.5 MG/G
OINTMENT TOPICAL ONCE
Status: CANCELLED | OUTPATIENT
Start: 2021-06-11 | End: 2021-06-11

## 2021-06-11 RX ORDER — BETAMETHASONE DIPROPIONATE 0.05 %
OINTMENT (GRAM) TOPICAL ONCE
Status: CANCELLED | OUTPATIENT
Start: 2021-06-11 | End: 2021-06-11

## 2021-06-11 RX ORDER — HYDROXYZINE PAMOATE 25 MG/1
25 CAPSULE ORAL 2 TIMES DAILY
Qty: 30 CAPSULE | Refills: 1 | Status: SHIPPED | OUTPATIENT
Start: 2021-06-11 | End: 2021-06-27

## 2021-06-11 RX ORDER — LIDOCAINE HYDROCHLORIDE 20 MG/ML
JELLY TOPICAL ONCE
Status: CANCELLED | OUTPATIENT
Start: 2021-06-11 | End: 2021-06-11

## 2021-06-11 RX ORDER — BACITRACIN ZINC AND POLYMYXIN B SULFATE 500; 1000 [USP'U]/G; [USP'U]/G
OINTMENT TOPICAL ONCE
Status: CANCELLED | OUTPATIENT
Start: 2021-06-11 | End: 2021-06-11

## 2021-06-11 RX ORDER — GINSENG 100 MG
CAPSULE ORAL ONCE
Status: CANCELLED | OUTPATIENT
Start: 2021-06-11 | End: 2021-06-11

## 2021-06-11 RX ORDER — LIDOCAINE 40 MG/G
CREAM TOPICAL ONCE
Status: CANCELLED | OUTPATIENT
Start: 2021-06-11 | End: 2021-06-11

## 2021-06-11 RX ORDER — GENTAMICIN SULFATE 1 MG/G
OINTMENT TOPICAL ONCE
Status: CANCELLED | OUTPATIENT
Start: 2021-06-11 | End: 2021-06-11

## 2021-06-11 RX ORDER — LIDOCAINE HYDROCHLORIDE 40 MG/ML
SOLUTION TOPICAL ONCE
Status: CANCELLED | OUTPATIENT
Start: 2021-06-11 | End: 2021-06-11

## 2021-06-11 ASSESSMENT — ENCOUNTER SYMPTOMS
WHEEZING: 0
SORE THROAT: 0
SHORTNESS OF BREATH: 0
COUGH: 0
CHOKING: 0
GASTROINTESTINAL NEGATIVE: 1
TROUBLE SWALLOWING: 0
CHEST TIGHTNESS: 0

## 2021-06-11 ASSESSMENT — PATIENT HEALTH QUESTIONNAIRE - PHQ9
SUM OF ALL RESPONSES TO PHQ9 QUESTIONS 1 & 2: 1
SUM OF ALL RESPONSES TO PHQ QUESTIONS 1-9: 1
2. FEELING DOWN, DEPRESSED OR HOPELESS: 1
SUM OF ALL RESPONSES TO PHQ QUESTIONS 1-9: 1
1. LITTLE INTEREST OR PLEASURE IN DOING THINGS: 0
SUM OF ALL RESPONSES TO PHQ QUESTIONS 1-9: 1

## 2021-06-11 NOTE — PROGRESS NOTES
2021    TELEHEALTH EVALUATION -- Audio/Visual (During [id number] public health emergency)    HPI:    Neelam Byrne (:  1990) has requested an audio/video evaluation for the following concern(s):    Gabriel Luna is a 33year old female who is in for follow up and medication refill. She is with her home health nurse who states Rizwana is scheduled for an in-house testing by neurology some time in July. Gabriel Luna states she has had some periods of increase in anxiety and found the hydroxyzine helped. She continues to take Celexa which she thinks is helping. The nurse states Gabriel Luna has been more interactive and engages in more group activities. She states she was going to counseling ([facility]) but she was not able to make appointments because of her episodes that required ED visits. She states she will restart her visits, plan is to call today and get an appointment. The nurse states the wound looks better and they have an appointment at the wound clinic today. Gabriel Luna states she feels happier, getting  in one month. She states she has not received the Covid vaccine and asked if she should get it. Explained the importance of her receiving the shots. She states she will think about getting that done. Review of Systems   Constitutional: Negative for activity change, appetite change, chills, diaphoresis, fatigue, fever and unexpected weight change. HENT: Negative for sore throat and trouble swallowing. Respiratory: Negative for cough, choking, chest tightness, shortness of breath and wheezing. Cardiovascular: Negative for chest pain and palpitations. Gastrointestinal: Negative. Neurological: Negative for dizziness, tremors, weakness, light-headedness and headaches. Psychiatric/Behavioral: Positive for sleep disturbance. Negative for agitation, behavioral problems, confusion, decreased concentration, dysphoric mood, hallucinations, self-injury and suicidal ideas. The patient is nervous/anxious. The patient is not hyperactive. Prior to Visit Medications    Medication Sig Taking? Authorizing Provider   hydrOXYzine (VISTARIL) 25 MG capsule Take 1 capsule by mouth 2 times daily Yes PRECIOUS Whitfield CNP   traZODone (DESYREL) 50 MG tablet Take 1 tablet by mouth nightly Yes PRECIOUS Whitfield CNP   citalopram (CELEXA) 10 MG tablet Take 1 tablet by mouth daily Yes PRECIOUS Whitfield CNP   Diapers & Supplies MISC Use prn four times daily- Adult Depends/Pull ups. Yes PRECIOUS Whitfield CNP   Incontinence Supply Disposable (DEPEND PANT LARGE) MISC  Yes Historical Provider, MD   ibuprofen (ADVIL;MOTRIN) 800 MG tablet Take 1 tablet by mouth every 6 hours as needed for Pain Yes PRECIOUS Whitfield CNP   Diapers & Supplies MISC 1 each by Does not apply route every 4 hours for 28 days Change diaper every 4 hours. PRECIOUS Whitfield CNP       Social History     Tobacco Use    Smoking status: Never Smoker    Smokeless tobacco: Never Used   Vaping Use    Vaping Use: Never used   Substance Use Topics    Alcohol use: No    Drug use: No          PHYSICAL EXAMINATION:  [ INSTRUCTIONS:  \"[x]\" Indicates a positive item  \"[]\" Indicates a negative item  -- DELETE ALL ITEMS NOT EXAMINED]  Vital Signs: (As obtained by patient/caregiver or practitioner observation)    Blood pressure-  Heart rate-    Respiratory rate-    Temperature-  Pulse oximetry-     Constitutional: [x] Appears well-developed and well-nourished [x] No apparent distress      [] Abnormal-   Mental status  [x] Alert and awake  [x] Oriented to person/place/time [x]Able to follow commands      Eyes:  EOM    []  Normal  [] Abnormal-  Sclera  [x]  Normal  [] Abnormal -         Discharge [x]  None visible  [] Abnormal -    HENT:   [x] Normocephalic, atraumatic.   [] Abnormal   [] Mouth/Throat: Mucous membranes are moist.     External Ears [x] Normal  [] Abnormal-      Neck: [x] No visualized mass     Pulmonary/Chest: [x] Respiratory effort normal.  [x] No visualized signs of difficulty breathing or respiratory distress        [] Abnormal-      Musculoskeletal:   [] Normal gait with no signs of ataxia         [x] Normal range of motion of neck        [] Abnormal-       Neurological:        [x] No Facial Asymmetry (Cranial nerve 7 motor function) (limited exam to video visit)          [] No gaze palsy        [] Abnormal-         Skin:        [x] No significant exanthematous lesions or discoloration noted on facial skin         [] Abnormal-            Psychiatric:       [x] Normal Affect [] No Hallucinations        [] Abnormal-     Other pertinent observable physical exam findings-     ASSESSMENT/PLAN:  1. Anxiety  - hydrOXYzine (VISTARIL) 25 MG capsule; Take 1 capsule by mouth 2 times daily  Dispense: 30 capsule; Refill: 1    2. Altered mental status, unspecified altered mental status type    3. Decubitus ulcer of right perineal ischial region, stage I    4. Other insomnia  - traZODone (DESYREL) 50 MG tablet; Take 1 tablet by mouth nightly  Dispense: 30 tablet; Refill: 3    Fluids, rest  Keep wound clinic and neurology appointments  Refills provided  Explained the potential side effects of vistaril with trazadone; encouraged to only use vistaril when necessary  Schedule follow up appointment with counseling  Recommended Covid vaccine  Verbalized understanding and agreement with plan    Return in about 4 months (around 10/11/2021). Riaz Mares, was evaluated through a synchronous (real-time) audio-video encounter. The patient (or guardian if applicable) is aware that this is a billable service. Verbal consent to proceed has been obtained within the past 12 months. The visit was conducted pursuant to the emergency declaration under the [address] [place] [address], [address] [place] [address] authority and the [employer] General Act.   Patient identification was verified, and a caregiver was present when appropriate. The patient was located in a state where the provider was credentialed to provide care. Total time spent on this encounter: Not billed by time    --PRECIOUS Barlow CNP on 6/11/2021 at 8:45 AM    An electronic signature was used to authenticate this note.

## 2021-06-11 NOTE — PROGRESS NOTES
Wound Care Center Progress Note With Procedure    Amador Adan  AGE: 33 y.o. GENDER: female  : 1990  EPISODE DATE:  2021     Subjective:     Chief Complaint   Patient presents with    Wound Check     rt ischium         HISTORY of PRESENT ILLNESS      Amador Adan is a 26 y.o. female who presents to the [address] [place] [address] for a visit for evaluation and treatment of Chronic pressure  ulcer(s) of  Buttocks R gluteal.  The condition is of moderate severity. The ulcer has been present for 2 weeks. The underlying cause is thought to be pressure. The patients care to date has included a trip to the ED, where she was seen and completed a course of abx. The patient has significant underlying medical conditions as below. Patient is not a diabetic or a smoker. She is not on any antibiotics. Patient is disabled and lives in an assisted living facility. She is incontinent and wheelchair bound.     Wound Pain Timing/Severity: none  Quality of pain: N/A  Severity of pain:  0 / 10   Modifying Factors: chronic pressure, decreased mobility, incontinence of stool and incontinence of urine  Associated Signs/Symptoms: edema, erythema and drainage        PAST MEDICAL HISTORY        Diagnosis Date    Back pain     Depression     Encephalitis     Meningitis     Paraplegia ([facility]     Suicidal ideation        PAST SURGICAL HISTORY    Past Surgical History:   Procedure Laterality Date    PRE-MALIGNANT / BENIGN SKIN LESION EXCISION       left shoulder       FAMILY HISTORY    History reviewed. No pertinent family history.     SOCIAL HISTORY    Social History     Tobacco Use    Smoking status: Never Smoker    Smokeless tobacco: Never Used   Vaping Use    Vaping Use: Never used   Substance Use Topics    Alcohol use: No    Drug use: No       ALLERGIES    Allergies   Allergen Reactions    Fish-Derived Products     Lemon Extract [Flavoring Agent]        MEDICATIONS    Current Outpatient Medications on File Prior to Encounter   Medication Sig Dispense Refill    citalopram (CELEXA) 10 MG tablet Take 1 tablet by mouth daily 30 tablet 5    Diapers & Supplies MISC Use prn four times daily- Adult Depends/Pull ups. 180 each 3    Incontinence Supply Disposable (DEPEND PANT LARGE) MISC   2    ibuprofen (ADVIL;MOTRIN) 800 MG tablet Take 1 tablet by mouth every 6 hours as needed for Pain 120 tablet 1    Diapers & Supplies MISC 1 each by Does not apply route every 4 hours for 28 days Change diaper every 4 hours. 120 each 2     No current facility-administered medications on file prior to encounter. REVIEW OF SYSTEMS    Pertinent items are noted in HPI. Constitutional: Negative for systemic symptoms including fever, chills and malaise. Objective:      /77   Pulse 87   Temp 98.5 °F (36.9 °C) (Temporal)   Resp 16     PHYSICAL EXAM      General: The patient is in no acute distress. Mental status:  Patient is appropriate, is  oriented to place and plan of care. Dermatologic exam: Visual inspection of the periwound reveals the skin to be normal in turgor and texture, dry and scaly  Wound exam: see wound description below in procedure note      Assessment:     Problem List Items Addressed This Visit     WD-Stage III pressure ulcer of right buttock ([address] - Primary        Procedure Note    Indications:  Based on my examination of this patient's wound(s) today, sharp excision into necrotic subcutaneous tissue is required to promote healing and evaluate the extent of previous healing. Performed by: PRECIOUS Flores - SAMIRA    Consent obtained: Yes    Time out taken:  Yes    Pain Control: Anesthetic  Anesthetic: 4% Lidocaine Liquid Topical     Debridement:Excisional Debridement    Using curette the wound(s) was/were sharply debrided down through and including the removal of subcutaneous tissue.         Devitalized Tissue Debrided:  fibrin, biofilm and slough    Pre Debridement Measurements:  Are located in the Wound Documentation Flow Sheet    All active wounds listed below with today's date are evaluated  Wound(s)    debrided this date include # : 1     Post  Debridement Measurements:  Wound 02/11/19 WOUND #1 RIGHT POSTERIOR THIGH pressure 1 (Active)   Number of days: 851       Wound 05/21/21 Buttocks Right Right Ischium  (Active)   Wound Image   05/21/21 1036   Wound Etiology Pressure Stage  3 06/11/21 1116   Wound Cleansed Cleansed with saline 06/11/21 1116   Offloading for Diabetic Foot Ulcers No 06/11/21 1116   Wound Length (cm) 2 cm 06/11/21 1116   Wound Width (cm) 2 cm 06/11/21 1116   Wound Depth (cm) 0.5 cm 06/11/21 1116   Wound Surface Area (cm^2) 4 cm^2 06/11/21 1116   Change in Wound Size % (l*w) -33.33 06/11/21 1116   Wound Volume (cm^3) 2 cm^3 06/11/21 1116   Wound Healing % -11 06/11/21 1116   Post-Procedure Length (cm) 2 cm 06/11/21 1119   Post-Procedure Width (cm) 2 cm 06/11/21 1119   Post-Procedure Depth (cm) 0.5 cm 06/11/21 1119   Post-Procedure Surface Area (cm^2) 4 cm^2 06/11/21 1119   Post-Procedure Volume (cm^3) 2 cm^3 06/11/21 1119   Distance Tunneling (cm) 0 cm 06/11/21 1116   Tunneling Position ___ O'Clock 0 06/11/21 1116   Undermining Starts ___ O'Clock 1200 06/11/21 1116   Undermining Ends___ O'Clock 0100 06/11/21 1116   Undermining Maxium Distance (cm) 0.3 06/11/21 1116   Wound Assessment Pink/red;Slough;Eschar moist 06/11/21 1116   Drainage Amount Moderate 06/11/21 1116   Drainage Description Serosanguinous 06/11/21 1116   Odor None 06/11/21 1116   Dyan-wound Assessment Fragile 06/11/21 1116   Margins Defined edges; Unattached edges 06/11/21 1116   Wound Thickness Description not for Pressure Injury Full thickness 06/11/21 1116   Number of days: 21       Percent of Wound(s) Debrided: approximately 100%    Total  Area  Debrided:  4 sq cm     Bleeding:  Minimal    Hemostasis Achieved:  by pressure    Procedural Pain:  0  / 10     Post Procedural Pain:  0 / 10     Response to treatment:  Well tolerated by patient. Status of wound progress and description from last visit:   Stable. Wound is clean and dry, but they have had issue with supplies and have only been using silver alginate and A&D ointment. We will simplify orders this week and try to send some supplies home with them. Continue to monitor and 2 week follow up per patient request.   Culture reviewed and no growth, no further action needed. Plan:       Discharge Instructions       PHYSICIAN ORDERS AND DISCHARGE INSTRUCTIONS     NOTE: Upon discharge from the [address], you will receive a patient experience survey. We would be grateful if you would take the time to fill this survey out.     Wound care order history:                 KEVIN's   Right       Left               Date:              Cultures:  Buttock wound cultured 5/21/21              Grafts:                Antibiotics:           Continuing wound care orders and information:                 Residence:                Continue home health care with:               Your wound-care supplies will be provided by:      Wound cleansing:               Do not scrub or use excessive force. Wash hands with soap and water before and after dressing changes. Prior to applying a clean dressing, cleanse wound with normal saline, wound cleanser, or mild soap and water. Ask the physician or nurse before getting the wound(s) wet in a shower     Daily Wound management:              Keep weight off wounds and reposition every 2 hours. Avoid standing for long periods of time. Apply wraps/stockings in AM and remove at bedtime. If swelling is present, elevate legs to the level of the heart or above for 30 minutes 4-5 times a day and/or when sitting. When taking antibiotics take entire prescription as ordered by physician do not stop taking until medicine is all gone.

## 2021-06-25 ENCOUNTER — HOSPITAL ENCOUNTER (OUTPATIENT)
Dept: WOUND CARE | Age: 31
Discharge: HOME | End: 2021-06-25
Payer: COMMERCIAL

## 2021-06-25 VITALS
DIASTOLIC BLOOD PRESSURE: 67 MMHG | HEART RATE: 78 BPM | RESPIRATION RATE: 16 BRPM | TEMPERATURE: 97.6 F | SYSTOLIC BLOOD PRESSURE: 110 MMHG

## 2021-06-25 DIAGNOSIS — L89.313 STAGE III PRESSURE ULCER OF RIGHT BUTTOCK (HCC): Primary | ICD-10-CM

## 2021-06-25 PROCEDURE — 11042 DBRDMT SUBQ TIS 1ST 20SQCM/<: CPT | Performed by: NURSE PRACTITIONER

## 2021-06-25 PROCEDURE — 11042 DBRDMT SUBQ TIS 1ST 20SQCM/<: CPT

## 2021-06-25 RX ORDER — CLOBETASOL PROPIONATE 0.5 MG/G
OINTMENT TOPICAL ONCE
Status: CANCELLED | OUTPATIENT
Start: 2021-06-25 | End: 2021-06-25

## 2021-06-25 RX ORDER — LIDOCAINE HYDROCHLORIDE 20 MG/ML
JELLY TOPICAL ONCE
Status: CANCELLED | OUTPATIENT
Start: 2021-06-25 | End: 2021-06-25

## 2021-06-25 RX ORDER — BACITRACIN, NEOMYCIN, POLYMYXIN B 400; 3.5; 5 [USP'U]/G; MG/G; [USP'U]/G
OINTMENT TOPICAL ONCE
Status: CANCELLED | OUTPATIENT
Start: 2021-06-25 | End: 2021-06-25

## 2021-06-25 RX ORDER — BETAMETHASONE DIPROPIONATE 0.05 %
OINTMENT (GRAM) TOPICAL ONCE
Status: CANCELLED | OUTPATIENT
Start: 2021-06-25 | End: 2021-06-25

## 2021-06-25 RX ORDER — BACITRACIN ZINC AND POLYMYXIN B SULFATE 500; 1000 [USP'U]/G; [USP'U]/G
OINTMENT TOPICAL ONCE
Status: CANCELLED | OUTPATIENT
Start: 2021-06-25 | End: 2021-06-25

## 2021-06-25 RX ORDER — GENTAMICIN SULFATE 1 MG/G
OINTMENT TOPICAL ONCE
Status: CANCELLED | OUTPATIENT
Start: 2021-06-25 | End: 2021-06-25

## 2021-06-25 RX ORDER — GINSENG 100 MG
CAPSULE ORAL ONCE
Status: CANCELLED | OUTPATIENT
Start: 2021-06-25 | End: 2021-06-25

## 2021-06-25 RX ORDER — LIDOCAINE HYDROCHLORIDE 40 MG/ML
SOLUTION TOPICAL ONCE
Status: CANCELLED | OUTPATIENT
Start: 2021-06-25 | End: 2021-06-25

## 2021-06-25 RX ORDER — LIDOCAINE 50 MG/G
OINTMENT TOPICAL ONCE
Status: CANCELLED | OUTPATIENT
Start: 2021-06-25 | End: 2021-06-25

## 2021-06-25 RX ORDER — LIDOCAINE 40 MG/G
CREAM TOPICAL ONCE
Status: CANCELLED | OUTPATIENT
Start: 2021-06-25 | End: 2021-06-25

## 2021-06-25 RX ORDER — LIDOCAINE 50 MG/G
OINTMENT TOPICAL ONCE
Status: DISCONTINUED | OUTPATIENT
Start: 2021-06-25 | End: 2021-06-26 | Stop reason: HOSPADM

## 2021-06-25 NOTE — PROGRESS NOTES
Wound Care Center Progress Note With Procedure    Darshan Byrne  AGE: 33 y.o. GENDER: female  : 1990  EPISODE DATE:  2021     Subjective:     Chief Complaint   Patient presents with    Wound Check     buttock         HISTORY of PRESENT ILLNESS      Kalen Levy is a 26 y. o. female who presents to the Wound Clinic for a visit for evaluation and treatment of Chronic pressure  ulcer(s) of  Buttocks R gluteal.  The condition is of moderate severity. The ulcer has been present for 2 weeks.  The underlying cause is thought to be pressure.  The patients care to date has included a trip to the ED, where she was seen and completed a course of abx. The patient has significant underlying medical conditions as below.   Patient caretaker states that the wound had a slight infection since last visit, but this resolved on its own. The lower part of the wound became inflamed and swollen and painful. As a result the wound measured slightly larger.      Wound Pain Timing/Severity: none  Quality of pain: N/A  Severity of pain:  0 / 10   Modifying Factors: chronic pressure, decreased mobility, incontinence of stool and incontinence of urine  Associated Signs/Symptoms: edema, erythema and drainage        PAST MEDICAL HISTORY        Diagnosis Date    Back pain     Depression     Encephalitis     Meningitis     Paraplegia ([address]     Suicidal ideation        PAST SURGICAL HISTORY    Past Surgical History:   Procedure Laterality Date    PRE-MALIGNANT / BENIGN SKIN LESION EXCISION       left shoulder       FAMILY HISTORY    History reviewed. No pertinent family history.     SOCIAL HISTORY    Social History     Tobacco Use    Smoking status: Never Smoker    Smokeless tobacco: Never Used   Vaping Use    Vaping Use: Never used   Substance Use Topics    Alcohol use: No    Drug use: No       ALLERGIES    Allergies   Allergen Reactions    Fish-Derived Products     Lemon Extract [Flavoring Agent] MEDICATIONS    Current Outpatient Medications on File Prior to Encounter   Medication Sig Dispense Refill    hydrOXYzine (VISTARIL) 25 MG capsule Take 1 capsule by mouth 2 times daily 30 capsule 1    traZODone (DESYREL) 50 MG tablet Take 1 tablet by mouth nightly 30 tablet 3    citalopram (CELEXA) 10 MG tablet Take 1 tablet by mouth daily 30 tablet 5    Diapers & Supplies MISC Use prn four times daily- Adult Depends/Pull ups. 180 each 3    Incontinence Supply Disposable (DEPEND PANT LARGE) MISC   2    ibuprofen (ADVIL;MOTRIN) 800 MG tablet Take 1 tablet by mouth every 6 hours as needed for Pain 120 tablet 1    Diapers & Supplies MISC 1 each by Does not apply route every 4 hours for 28 days Change diaper every 4 hours. 120 each 2     No current facility-administered medications on file prior to encounter. REVIEW OF SYSTEMS    Pertinent items are noted in HPI. Constitutional: Negative for systemic symptoms including fever, chills and malaise. Objective:      /67   Pulse 78   Temp 97.6 °F (36.4 °C) (Temporal)   Resp 16     PHYSICAL EXAM      General: The patient is in no acute distress. Mental status:  Patient is appropriate, is  oriented to place and plan of care. 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     Performed by: PRECIOUS Raymond - SAMIRA    Consent obtained: Yes    Time out taken:  Yes    Pain Control: Anesthetic  Anesthetic: 5% Lidocaine Ointment Topical     Debridement:Excisional Debridement    Using curette, #15 blade scalpel and forceps the wound(s) was/were sharply debrided down through and including the removal of subcutaneous tissue. Devitalized Tissue Debrided:  fibrin, biofilm, slough and necrotic/eschar    Pre Debridement Measurements:  Are located in the Wound Documentation Flow Sheet    All active wounds listed below with today's date are evaluated  Wound(s)    debrided this date include # : right ischium     Post  Debridement Measurements:  Wound 05/21/21 Buttocks Right Right Ischium  (Active)   Wound Image   06/25/21 1120   Wound Etiology Pressure Stage  3 06/11/21 1116   Dressing Status Clean;Dry; Intact; New dressing applied 06/11/21 1145   Wound Cleansed Cleansed with saline 06/25/21 1120   Dressing/Treatment Silver dressing;Alginate with Ag;Silicone border 55/81/38 1145   Offloading for Diabetic Foot Ulcers No 06/11/21 1116   Wound Length (cm) 2.5 cm 06/25/21 1120   Wound Width (cm) 3.2 cm 06/25/21 1120   Wound Depth (cm) 0.5 cm 06/25/21 1120   Wound Surface Area (cm^2) 8 cm^2 06/25/21 1120   Change in Wound Size % (l*w) -166.67 06/25/21 1120   Wound Volume (cm^3) 4 cm^3 06/25/21 1120   Wound Healing % -122 06/25/21 1120   Post-Procedure Length (cm) 2.5 cm 06/25/21 1133   Post-Procedure Width (cm) 3.2 cm 06/25/21 1133   Post-Procedure Depth (cm) 0.5 cm 06/25/21 1133   Post-Procedure Surface Area (cm^2) 8 cm^2 06/25/21 1133   Post-Procedure Volume (cm^3) 4 cm^3 06/25/21 1133   Distance Tunneling (cm) 0 cm 06/25/21 1120   Tunneling Position ___ O'Clock 0 06/25/21 1120   Undermining Starts ___ O'Clock 11 06/25/21 1120   Undermining Ends___ O'Clock 1 06/25/21 1120   Undermining Maxium Distance (cm) 0.5 06/25/21 1120   Wound Assessment Pink/red;Slough;Eschar moist 06/11/21 1116   Drainage Amount Moderate 06/11/21 1116   Drainage Description Serosanguinous 06/11/21 1116   Odor None 06/11/21 1116   Dyan-wound Assessment Fragile 06/11/21 1116   Margins Defined edges; Unattached edges 06/11/21 1116   Wound Thickness Description not for Pressure Injury Full thickness 06/11/21 1116   Number of days: 35       Percent of Wound(s) Debrided: approximately 100%    Total  Area  Debrided:  8 sq cm     Bleeding:  Minimal    Hemostasis Achieved:  by pressure    Procedural Pain:  3  / 10     Post Procedural Pain:  0 / 10     Response to treatment:  Well tolerated by patient. Status of wound progress and description from last visit:   Wound is dry and clean, but there appears to have been a small infection since last visit that has resolved. Debrided small amount of dark necrotic tissue. We will continue plan of care for now. Patient is usually a 2 week follow up, but informed caregiver to come next week if wound is not progressing well. They have not gotten their supplies, so we will work on this as well. Plan:       Discharge Instructions       PHYSICIAN ORDERS AND DISCHARGE INSTRUCTIONS     NOTE: Upon discharge from the [address], you will receive a patient experience survey. We would be grateful if you would take the time to fill this survey out.     Wound care order history:                 KEVIN's   Right       Left               Date:              Cultures:  Buttock wound cultured 5/21/21              Grafts:                Antibiotics:           Continuing wound care orders and information:                 Residence:                Continue home health care with:               Your wound-care supplies will be provided by:      Wound cleansing:               XF not scrub or use excessive force.              Wash hands with soap and water before and after dressing changes.               IFRBH to applying a clean dressing, cleanse wound with normal saline, wound cleanser, or mild soap and water.               Ask the physician or nurse before getting the wound(s) wet in a shower     Daily Wound management:              Keep weight off wounds and reposition every 2 hours.              Avoid standing for long periods of time.              Apply wraps/stockings in AM and remove at bedtime.              If swelling is present, elevate legs to the level of the heart or above for 30 minutes 4-5 times a day and/or when sitting.                                      When taking antibiotics take entire prescription as ordered by physician do not stop taking until medicine is all gone.                                                           Orders for this week: 6/25/21     Buttock wound cultured 5/21/21     Rx: [facility] [place] [id number]  Right Buttock - wash with soap and water, pat dry. Apply ronald to wound bed, cover with silver alginate and secure with mepilex border. Change every 2-3 days and as needed.     Pressure reduction will facilitate healing, reposition at least every 2 hours.        Follow up with Mary Rodriguez CNP in 2 weeks in the wound care center  Call [phone] for any questions or concerns.   Date__________   Time____________        Treatment Note      Written Patient Dismissal Instructions Given            Electronically signed by PRECIOUS Alvarado CNP on 6/25/2021 at 11:38 AM

## 2021-06-27 DIAGNOSIS — F41.9 ANXIETY: ICD-10-CM

## 2021-06-27 RX ORDER — HYDROXYZINE PAMOATE 25 MG/1
25 CAPSULE ORAL 2 TIMES DAILY
Qty: 30 CAPSULE | Refills: 1 | Status: SHIPPED | OUTPATIENT
Start: 2021-06-27 | End: 2022-01-12 | Stop reason: SDUPTHER

## 2021-07-09 ENCOUNTER — HOSPITAL ENCOUNTER (OUTPATIENT)
Dept: WOUND CARE | Age: 31
Discharge: HOME | End: 2021-07-09
Payer: COMMERCIAL

## 2021-07-09 DIAGNOSIS — L89.313 STAGE III PRESSURE ULCER OF RIGHT BUTTOCK (HCC): Primary | ICD-10-CM

## 2021-07-09 PROCEDURE — 11042 DBRDMT SUBQ TIS 1ST 20SQCM/<: CPT

## 2021-07-09 PROCEDURE — 11042 DBRDMT SUBQ TIS 1ST 20SQCM/<: CPT | Performed by: NURSE PRACTITIONER

## 2021-07-09 RX ORDER — LIDOCAINE 50 MG/G
OINTMENT TOPICAL ONCE
Status: CANCELLED | OUTPATIENT
Start: 2021-07-09 | End: 2021-07-09

## 2021-07-09 RX ORDER — LIDOCAINE HYDROCHLORIDE 40 MG/ML
SOLUTION TOPICAL ONCE
Status: CANCELLED | OUTPATIENT
Start: 2021-07-09 | End: 2021-07-09

## 2021-07-09 RX ORDER — LIDOCAINE HYDROCHLORIDE 20 MG/ML
JELLY TOPICAL ONCE
Status: CANCELLED | OUTPATIENT
Start: 2021-07-09 | End: 2021-07-09

## 2021-07-09 RX ORDER — BACITRACIN, NEOMYCIN, POLYMYXIN B 400; 3.5; 5 [USP'U]/G; MG/G; [USP'U]/G
OINTMENT TOPICAL ONCE
Status: CANCELLED | OUTPATIENT
Start: 2021-07-09 | End: 2021-07-09

## 2021-07-09 RX ORDER — BACITRACIN ZINC AND POLYMYXIN B SULFATE 500; 1000 [USP'U]/G; [USP'U]/G
OINTMENT TOPICAL ONCE
Status: CANCELLED | OUTPATIENT
Start: 2021-07-09 | End: 2021-07-09

## 2021-07-09 RX ORDER — LIDOCAINE 40 MG/G
CREAM TOPICAL ONCE
Status: CANCELLED | OUTPATIENT
Start: 2021-07-09 | End: 2021-07-09

## 2021-07-09 RX ORDER — GENTAMICIN SULFATE 1 MG/G
OINTMENT TOPICAL ONCE
Status: CANCELLED | OUTPATIENT
Start: 2021-07-09 | End: 2021-07-09

## 2021-07-09 RX ORDER — CLOBETASOL PROPIONATE 0.5 MG/G
OINTMENT TOPICAL ONCE
Status: CANCELLED | OUTPATIENT
Start: 2021-07-09 | End: 2021-07-09

## 2021-07-09 RX ORDER — BETAMETHASONE DIPROPIONATE 0.05 %
OINTMENT (GRAM) TOPICAL ONCE
Status: CANCELLED | OUTPATIENT
Start: 2021-07-09 | End: 2021-07-09

## 2021-07-09 RX ORDER — GINSENG 100 MG
CAPSULE ORAL ONCE
Status: CANCELLED | OUTPATIENT
Start: 2021-07-09 | End: 2021-07-09

## 2021-07-09 NOTE — PROGRESS NOTES
Wound Care Center Progress Note With Procedure    Manuel Leigh  AGE: 33 y.o. GENDER: female  : 1990  EPISODE DATE:  2021     Subjective:     Chief Complaint   Patient presents with    Wound Check     back         HISTORY of PRESENT ILLNESS      Kalen Levy is a 26 y. o. female who presents to the Wound Clinic for a visit for evaluation and treatment of Chronic pressure  ulcer(s) of  Buttocks R gluteal.  The condition is of moderate severity. The ulcer has been present for 2 weeks.  The underlying cause is thought to be pressure.  The patients care to date has included a trip to the ED, where she was seen and completed a course of abx. The patient has significant underlying medical conditions as below.   Patient caretaker states that the wound had a slight infection since last visit, but this resolved on its own. The lower part of the wound became inflamed and swollen and painful. As a result the wound measured slightly larger.      Wound Pain Timing/Severity: none  Quality of pain: N/A  Severity of pain:  0 / 10   Modifying Factors: chronic pressure, decreased mobility, incontinence of stool and incontinence of urine  Associated Signs/Symptoms: edema, erythema and drainage        PAST MEDICAL HISTORY        Diagnosis Date    Back pain     Depression     Encephalitis     Meningitis     [facility]ap[facility]ia ([facility] [address]     Suicidal ideation        PAST SURGICAL HISTORY    Past Surgical History:   Procedure Laterality Date    PRE-MALIGNANT / BENIGN SKIN LESION EXCISION       left shoulder       FAMILY HISTORY    History reviewed. No pertinent family history.     SOCIAL HISTORY    Social History     Tobacco Use    Smoking status: Never Smoker    Smokeless tobacco: Never Used   Vaping Use    Vaping Use: Never used   Substance Use Topics    Alcohol use: No    Drug use: No       ALLERGIES    Allergies   Allergen Reactions    Fish-Derived Products     Lemon Extract [Flavoring Agent] MEDICATIONS    Current Outpatient Medications on File Prior to Encounter   Medication Sig Dispense Refill    hydrOXYzine (VISTARIL) 25 MG capsule Take 1 capsule by mouth 2 times daily 30 capsule 1    traZODone (DESYREL) 50 MG tablet Take 1 tablet by mouth nightly 30 tablet 3    citalopram (CELEXA) 10 MG tablet Take 1 tablet by mouth daily 30 tablet 5    Diapers & Supplies MISC Use prn four times daily- Adult Depends/Pull ups. 180 each 3    Incontinence Supply Disposable (DEPEND PANT LARGE) MISC   2    ibuprofen (ADVIL;MOTRIN) 800 MG tablet Take 1 tablet by mouth every 6 hours as needed for Pain 120 tablet 1    Diapers & Supplies MISC 1 each by Does not apply route every 4 hours for 28 days Change diaper every 4 hours. 120 each 2     No current facility-administered medications on file prior to encounter. REVIEW OF SYSTEMS    Pertinent items are noted in HPI. Constitutional: Negative for systemic symptoms including fever, chills and malaise. Objective: There were no vitals taken for this visit. PHYSICAL EXAM      General: The patient is in no acute distress. Mental status:  Patient is appropriate, is  oriented to place and plan of care. Dermatologic exam: Visual inspection of the periwound reveals the skin to be normal in turgor and texture and dry  Wound exam: see wound description below in procedure note      Assessment:     Problem List Items Addressed This Visit     WD-Stage III pressure ulcer of right buttock ([address] - Primary    Relevant Orders    Initiate Outpatient Wound Care Protocol        Procedure Note    Indications:  Based on my examination of this patient's wound(s) today, sharp excision into necrotic subcutaneous tissue is required to promote healing and evaluate the extent of previous healing.     Performed by: PRECIOUS Bean - CNP    Consent obtained: Yes    Time out taken:  Yes    Pain Control: N/a       Debridement:Excisional Debridement    Using curette the wound(s) was/were sharply debrided down through and including the removal of subcutaneous tissue. Devitalized Tissue Debrided:  fibrin, biofilm and slough    Pre Debridement Measurements:  Are located in the Wound Documentation Flow Sheet    All active wounds listed below with today's date are evaluated  Wound(s)    debrided this date include # : right buttock     Post  Debridement Measurements:  Wound 02/11/19 WOUND #1 RIGHT POSTERIOR THIGH pressure 1 (Active)   Number of days: 879       Wound 05/21/21 Buttocks Right Right Ischium  (Active)   Wound Image   06/25/21 1120   Wound Etiology Pressure Stage  3 06/11/21 1116   Dressing Status Clean;Dry; Intact; New dressing applied 06/11/21 1145   Wound Cleansed Cleansed with saline 07/09/21 1033   Dressing/Treatment Silver dressing;Alginate with Ag;Silicone border 76/13/79 1145   Offloading for Diabetic Foot Ulcers No 06/11/21 1116   Wound Length (cm) 2.8 cm 07/09/21 1033   Wound Width (cm) 2.4 cm 07/09/21 1033   Wound Depth (cm) 0.2 cm 07/09/21 1033   Wound Surface Area (cm^2) 6.72 cm^2 07/09/21 1033   Change in Wound Size % (l*w) -124 07/09/21 1033   Wound Volume (cm^3) 1.344 cm^3 07/09/21 1033   Wound Healing % 25 07/09/21 1033   Post-Procedure Length (cm) 2.8 cm 07/09/21 1044   Post-Procedure Width (cm) 2.4 cm 07/09/21 1044   Post-Procedure Depth (cm) 0.2 cm 07/09/21 1044   Post-Procedure Surface Area (cm^2) 6.72 cm^2 07/09/21 1044   Post-Procedure Volume (cm^3) 1.344 cm^3 07/09/21 1044   Distance Tunneling (cm) 0 cm 06/25/21 1120   Tunneling Position ___ O'Clock 0 06/25/21 1120   Undermining Starts ___ O'Clock 11 06/25/21 1120   Undermining Ends___ O'Clock 1 06/25/21 1120   Undermining Maxium Distance (cm) 0.5 06/25/21 1120   Wound Assessment Pink/red;Slough;Eschar moist 07/09/21 1033   Drainage Amount Moderate 07/09/21 1033   Drainage Description Serosanguinous 07/09/21 1033   Odor None 07/09/21 1033   Dyan-wound Assessment Fragile 07/09/21 1033   Margins Defined edges; Unattached edges 07/09/21 1033   Wound Thickness Description not for Pressure Injury Full thickness 07/09/21 1033   Number of days: 49       Percent of Wound(s) Debrided: approximately 100%    Total  Area  Debrided:  6.72 sq cm     Bleeding:  Minimal    Hemostasis Achieved:  by pressure    Procedural Pain:  3  / 10     Post Procedural Pain:  0 / 10     Response to treatment:  Well tolerated by patient. Status of wound progress and description from last visit:   Improved. All evidence of infection is gone, and this is likely resolved. We are still trying to get patient the supplies they need. Continue to monitor and continue plan of care for now. Follow up 2 weeks       Plan:       Discharge Instructions       [address]     NOTE: Upon discharge from the [address], you will receive a patient experience survey. We would be grateful if you would take the time to fill this survey out.     Wound care order history:                 KEVIN's   Right       Left               Date:              Cultures:  Buttock wound cultured 5/21/21              Grafts:                Antibiotics:           Continuing wound care orders and information:                 Residence:                Continue home health care with:               Your wound-care supplies will be provided by:      Wound cleansing:               XD not scrub or use excessive force.              Wash hands with soap and water before and after dressing changes.               FTPDE to applying a clean dressing, cleanse wound with normal saline, wound cleanser, or mild soap and water.               Ask the physician or nurse before getting the wound(s) wet in a shower     Daily Wound management:              Keep weight off wounds and reposition every 2 hours.              Avoid standing for long periods of time.              Apply wraps/stockings in AM and remove at bedtime.              If swelling is present, elevate legs to the level of the heart or above for 30 minutes 4-5 times a day and/or when sitting.                                      When taking antibiotics take entire prescription as ordered by physician do not stop taking until medicine is all gone.                                                           Orders for this week: 7/9/21     Buttock wound cultured 5/21/21     Rx: [facility] [address] [place] [address]  Right Buttock - wash with soap and water, pat dry. Apply ronald to wound bed, cover with silver alginate and secure with mepilex border. Change every 2-3 days and as needed.     Pressure reduction will facilitate healing, reposition at least every 2 hours.        Follow up with Nadir Love CNP in 2 weeks in the wound care center  Call [phone] for any questions or concerns.   Date__________   Time____________        Treatment Note      Written Patient Dismissal Instructions Given            Electronically signed by PRECIOUS Bean CNP on 7/9/2021 at 11:08 AM

## 2021-07-23 ENCOUNTER — HOSPITAL ENCOUNTER (OUTPATIENT)
Dept: WOUND CARE | Age: 31
Discharge: HOME | End: 2021-07-23

## 2021-08-27 ENCOUNTER — HOSPITAL ENCOUNTER (OUTPATIENT)
Dept: WOUND CARE | Age: 31
Discharge: HOME | End: 2021-08-27

## 2021-09-03 ENCOUNTER — HOSPITAL ENCOUNTER (OUTPATIENT)
Dept: WOUND CARE | Age: 31
Discharge: HOME | End: 2021-09-03

## 2021-10-04 ENCOUNTER — OFFICE VISIT (OUTPATIENT)
Dept: FAMILY MEDICINE CLINIC | Age: 31
End: 2021-10-04
Payer: COMMERCIAL

## 2021-10-04 ENCOUNTER — HOSPITAL ENCOUNTER (OUTPATIENT)
Age: 31
Setting detail: SPECIMEN
Discharge: HOME | End: 2021-10-04
Payer: COMMERCIAL

## 2021-10-04 VITALS — TEMPERATURE: 97.4 F | OXYGEN SATURATION: 98 % | HEART RATE: 97 BPM

## 2021-10-04 DIAGNOSIS — J02.9 SORE THROAT: ICD-10-CM

## 2021-10-04 DIAGNOSIS — M79.10 MUSCLE ACHE: ICD-10-CM

## 2021-10-04 DIAGNOSIS — R51.9 GENERALIZED HEADACHE: Primary | ICD-10-CM

## 2021-10-04 PROCEDURE — 87880 STREP A ASSAY W/OPTIC: CPT | Performed by: NURSE PRACTITIONER

## 2021-10-04 PROCEDURE — U0003 INFECTIOUS AGENT DETECTION BY NUCLEIC ACID (DNA OR RNA); SEVERE ACUTE RESPIRATORY SYNDROME CORONAVIRUS 2 (SARS-COV-2) (CORONAVIRUS DISEASE [COVID-19]), AMPLIFIED PROBE TECHNIQUE, MAKING USE OF HIGH THROUGHPUT TECHNOLOGIES AS DESCRIBED BY CMS-2020-01-R: HCPCS

## 2021-10-04 PROCEDURE — 99213 OFFICE O/P EST LOW 20 MIN: CPT | Performed by: NURSE PRACTITIONER

## 2021-10-04 PROCEDURE — 1036F TOBACCO NON-USER: CPT | Performed by: NURSE PRACTITIONER

## 2021-10-04 PROCEDURE — U0005 INFEC AGEN DETEC AMPLI PROBE: HCPCS

## 2021-10-04 PROCEDURE — G8427 DOCREV CUR MEDS BY ELIG CLIN: HCPCS | Performed by: NURSE PRACTITIONER

## 2021-10-04 PROCEDURE — G8484 FLU IMMUNIZE NO ADMIN: HCPCS | Performed by: NURSE PRACTITIONER

## 2021-10-04 PROCEDURE — G8421 BMI NOT CALCULATED: HCPCS | Performed by: NURSE PRACTITIONER

## 2021-10-04 NOTE — PATIENT INSTRUCTIONS
Your COVID 19 test can take 1-5 days for the results to come back. We ask that you make a Mychart page and view your test results this way. You will need to Self quarantine until you know your results. Increase fluids and rest  Warm salt gargles as needed for throat discomfort  Monitor temperature twice a day  Tylenol as needed for fevers and/or discomfort. Big deep breaths periodically throughout the day  If symptoms worsen -Go to the ER. Follow up with your primary care provider      To Whom it May Concern:    Vy Christine was tested for COVID-19 10/4/2021. He/she must stay home until test results are back. If test is positive, he/she must quarantine for a total of 10 days starting from day one of symptom onset. He/she must also be fever-free for 24 hours at that time, and also have improvement in symptoms. We do not recommend retesting as patients may continue to test positive for months even though no longer contagious. It is suggested you call [address] [facility] or [facility] with any questions regarding quarantine timeframe/return to work/school details.

## 2021-10-04 NOTE — PROGRESS NOTES
10/4/21  Ernesto Hernadez  1990    FLU/COVID-19 CLINIC EVALUATION    HPI SYMPTOMS:    Employer:    [x] Fevers  [] Chills  [] Cough  [] Coughing up blood  [] Chest Congestion  [] Nasal Congestion  [] Feeling short of breath  [] Sometimes  [] Frequently  [] All the time  [x] Chest pain- Heaviness  [x] Headaches  [x]Tolerable  [] Severe  [x] Sore throat  [x] Muscle aches  [] Nausea  [] Vomiting  []Unable to keep fluids down  [] Diarrhea  []Severe    [] OTHER SYMPTOMS:      Symptom Duration:   [] 1  [] 2   [x] 3   [] 4    [] 5   [] 6   [] 7   [] 8   [] 9   [] 10   [] 11   [] 12   [] 13   [] 14   [] Longer than 14 days    Symptom course:   [] Worsening     [] Stable     [] Improving    RISK FACTORS:    [] Pregnant or possibly pregnant  [] Age over 61  [] Diabetes  [] Heart disease  [] Asthma  [] COPD/Other chronic lung diseases  [] Active Cancer  [] On Chemotherapy  [] Taking oral steroids  [] History Lymphoma/Leukemia  [] Close contact with a lab confirmed COVID-19 patient within 14 days of symptom onset  [] History of travel from affected geographical areas within 14 days of symptom onset       VITALS:  There were no vitals filed for this visit. TESTS:    POCT FLU:  [] Positive     []Negative    ASSESSMENT:    [] Flu  [] Possible COVID-19  [] Strep    PLAN:    [] Discharge home with written instructions for:  [] Flu management  [] Possible COVID-19 infection with self-quarantine and management of symptoms  [] Follow-up with primary care physician or emergency department if worsens  [] Evaluation per physician/NP/PA in clinic  [] Sent to ER       An  electronic signature was used to authenticate this note.      --Ilia Marquez MA on 10/4/2021 at 5:31 PM

## 2021-10-04 NOTE — PROGRESS NOTES
10/4/2021    HPI:  Chief complaint and history of present illness as per medical assistant/nurse documented today in the Flu/COVID-19 clinic. Patient is here with complaints of fever, chest heaviness as if she feels like she is getting a chest cold, headache, sore throat, and muscle aches x 3 days. Patient states she has had her covid vaccine. MEDICATIONS:  Prior to Visit Medications    Medication Sig Taking? Authorizing Provider   hydrOXYzine (VISTARIL) 25 MG capsule Take 1 capsule by mouth 2 times daily  PRECIOUS Toro CNP   traZODone (DESYREL) 50 MG tablet Take 1 tablet by mouth nightly  PRECIOUS Toro CNP   citalopram (CELEXA) 10 MG tablet Take 1 tablet by mouth daily  PRECIOUS Toro CNP   Diapers & Supplies MISC Use prn four times daily- Adult Depends/Pull ups. PRECIOUS Toro CNP   Incontinence Supply Disposable (DEPEND PANT LARGE) MISC   Historical Provider, MD   ibuprofen (ADVIL;MOTRIN) 800 MG tablet Take 1 tablet by mouth every 6 hours as needed for Pain  PRECIOUS Toro CNP   Diapers & Supplies MISC 1 each by Does not apply route every 4 hours for 28 days Change diaper every 4 hours. PRECIOUS Toro CNP       Allergies   Allergen Reactions    Fish-Derived Products     Lemon Extract [Flavoring Agent]    ,   Past Medical History:   Diagnosis Date    Back pain     Depression     Encephalitis     Meningitis     Paraplegia ([facility] [address]     Suicidal ideation    ,   Past Surgical History:   Procedure Laterality Date    PRE-MALIGNANT / BENIGN SKIN LESION EXCISION      2015 left shoulder   ,   Social History     Tobacco Use    Smoking status: Never Smoker    Smokeless tobacco: Never Used   Vaping Use    Vaping Use: Never used   Substance Use Topics    Alcohol use: No    Drug use: No   , History reviewed. No pertinent family history. ,   Immunization History   Administered Date(s) Administered    COVID-19, Moderna, PF, 100mcg/0.5mL 06/15/2021, 08/05/2021   , Health Maintenance   Topic Date Due    Hepatitis C screen  Never done    Varicella vaccine (1 of 2 - 2-dose childhood series) Never done    HIV screen  Never done    DTaP/Tdap/Td vaccine (1 - Tdap) Never done    Cervical cancer screen  Never done    Flu vaccine (1) Never done    COVID-19 Vaccine  Completed    Hepatitis A vaccine  Aged Out    Hepatitis B vaccine  Aged Out    Hib vaccine  Aged Out    Meningococcal (ACWY) vaccine  Aged Out    Pneumococcal 0-64 years Vaccine  Aged Out       PHYSICAL EXAM:  Physical Exam  Constitutional:       Appearance: Normal appearance. HENT:      Head: Normocephalic. Right Ear: Tympanic membrane, ear canal and external ear normal.      Left Ear: Tympanic membrane, ear canal and external ear normal.      Nose: Nose normal.      Mouth/Throat:      Lips: Pink. Mouth: Mucous membranes are moist.      Pharynx: Posterior oropharyngeal erythema present. Cardiovascular:      Rate and Rhythm: Normal rate and regular rhythm. Heart sounds: Normal heart sounds. Pulmonary:      Effort: Pulmonary effort is normal.      Breath sounds: Normal breath sounds. Musculoskeletal:      Cervical back: Neck supple. Skin:     General: Skin is warm and dry. Neurological:      Mental Status: She is alert and oriented to person, place, and time. Psychiatric:         Mood and Affect: Mood normal.         Behavior: Behavior normal.         ASSESSMENT/PLAN:  1. Generalized headache  Your COVID 19 test can take 1-5 days for the results to come back. We ask that you make a Mychart page and view your test results this way. You will need to Self quarantine until you know your results. Increase fluids and rest  Warm salt gargles as needed for throat discomfort  Monitor temperature twice a day  Tylenol as needed for fevers and/or discomfort. Big deep breaths periodically throughout the day  If symptoms worsen -Go to the ER.    Follow up with your primary care provider  - Covid-19 Ambulatory    2. Sore throat  Strep test is negative. - Covid-19 Ambulatory  - POCT rapid strep A    3. Muscle ache  - Covid-19 Ambulatory      FOLLOW-UP:  Return if symptoms worsen or fail to improve.     In addition to other information, the printed after visit summary provided to the patient includes:  [x] COVID-19 Self care instructions  [x] COVID-19 General patient information

## 2021-10-05 LAB — SARS-COV-2: NOT DETECTED

## 2022-01-12 ENCOUNTER — OFFICE VISIT (OUTPATIENT)
Dept: FAMILY MEDICINE CLINIC | Age: 32
End: 2022-01-12
Payer: COMMERCIAL

## 2022-01-12 VITALS
OXYGEN SATURATION: 96 % | TEMPERATURE: 97.4 F | SYSTOLIC BLOOD PRESSURE: 115 MMHG | DIASTOLIC BLOOD PRESSURE: 74 MMHG | HEART RATE: 101 BPM | HEIGHT: 64 IN | BODY MASS INDEX: 34.33 KG/M2

## 2022-01-12 DIAGNOSIS — F40.9 FEAR FOR PERSONAL SAFETY: ICD-10-CM

## 2022-01-12 DIAGNOSIS — K08.89 TOOTH PAIN WITH CHEWING: ICD-10-CM

## 2022-01-12 DIAGNOSIS — F33.1 MODERATE EPISODE OF RECURRENT MAJOR DEPRESSIVE DISORDER (HCC): Primary | ICD-10-CM

## 2022-01-12 DIAGNOSIS — F41.9 ANXIETY: ICD-10-CM

## 2022-01-12 PROCEDURE — 1036F TOBACCO NON-USER: CPT | Performed by: NURSE PRACTITIONER

## 2022-01-12 PROCEDURE — G8484 FLU IMMUNIZE NO ADMIN: HCPCS | Performed by: NURSE PRACTITIONER

## 2022-01-12 PROCEDURE — 99214 OFFICE O/P EST MOD 30 MIN: CPT | Performed by: NURSE PRACTITIONER

## 2022-01-12 PROCEDURE — G8427 DOCREV CUR MEDS BY ELIG CLIN: HCPCS | Performed by: NURSE PRACTITIONER

## 2022-01-12 PROCEDURE — G8417 CALC BMI ABV UP PARAM F/U: HCPCS | Performed by: NURSE PRACTITIONER

## 2022-01-12 RX ORDER — HYDROXYZINE PAMOATE 25 MG/1
25 CAPSULE ORAL 2 TIMES DAILY
Qty: 30 CAPSULE | Refills: 1 | Status: SHIPPED | OUTPATIENT
Start: 2022-01-12 | End: 2022-02-14 | Stop reason: SDUPTHER

## 2022-01-12 RX ORDER — BUSPIRONE HYDROCHLORIDE 5 MG/1
5 TABLET ORAL 3 TIMES DAILY
Qty: 90 TABLET | Refills: 0 | Status: SHIPPED | OUTPATIENT
Start: 2022-01-12 | End: 2022-02-14 | Stop reason: SDUPTHER

## 2022-01-12 RX ORDER — CITALOPRAM 10 MG/1
10 TABLET ORAL DAILY
Qty: 30 TABLET | Refills: 5 | Status: ON HOLD | OUTPATIENT
Start: 2022-01-12 | End: 2022-04-20 | Stop reason: HOSPADM

## 2022-01-12 ASSESSMENT — PATIENT HEALTH QUESTIONNAIRE - PHQ9
10. IF YOU CHECKED OFF ANY PROBLEMS, HOW DIFFICULT HAVE THESE PROBLEMS MADE IT FOR YOU TO DO YOUR WORK, TAKE CARE OF THINGS AT HOME, OR GET ALONG WITH OTHER PEOPLE: 2
2. FEELING DOWN, DEPRESSED OR HOPELESS: 2
SUM OF ALL RESPONSES TO PHQ9 QUESTIONS 1 & 2: 4
SUM OF ALL RESPONSES TO PHQ QUESTIONS 1-9: 16
8. MOVING OR SPEAKING SO SLOWLY THAT OTHER PEOPLE COULD HAVE NOTICED. OR THE OPPOSITE, BEING SO FIGETY OR RESTLESS THAT YOU HAVE BEEN MOVING AROUND A LOT MORE THAN USUAL: 0
9. THOUGHTS THAT YOU WOULD BE BETTER OFF DEAD, OR OF HURTING YOURSELF: 0
7. TROUBLE CONCENTRATING ON THINGS, SUCH AS READING THE NEWSPAPER OR WATCHING TELEVISION: 2
4. FEELING TIRED OR HAVING LITTLE ENERGY: 2
3. TROUBLE FALLING OR STAYING ASLEEP: 3
SUM OF ALL RESPONSES TO PHQ QUESTIONS 1-9: 16
1. LITTLE INTEREST OR PLEASURE IN DOING THINGS: 2
SUM OF ALL RESPONSES TO PHQ QUESTIONS 1-9: 16
SUM OF ALL RESPONSES TO PHQ QUESTIONS 1-9: 16
5. POOR APPETITE OR OVEREATING: 3
6. FEELING BAD ABOUT YOURSELF - OR THAT YOU ARE A FAILURE OR HAVE LET YOURSELF OR YOUR FAMILY DOWN: 2

## 2022-01-12 NOTE — PROGRESS NOTES
Hilarie Sandhoff  1990  33 y.o. SUBJECT RAKESH:    Chief Complaint   Patient presents with    Anxiety     Pt requesting a higher dosage     Other     Pt requesting meds for OCD       Not taking citalopram, states it wasn't working so she stopped taking it. Patient reports that her new relationship with her wife is stressful, due to wife's previous relationships, personal drama and history. Patient states that she has considered counseling, but she has not initiated any treatments yet. Tooth pain on the left side for about 1 month. Worse with chewing, nothing noted that alleviates it. Described as sharp pain 6/10 when at the worse. Current Outpatient Medications on File Prior to Visit   Medication Sig Dispense Refill    Diapers & Supplies MISC Use prn four times daily- Adult Depends/Pull ups. 180 each 3    Incontinence Supply Disposable (DEPEND PANT LARGE) MISC   2    ibuprofen (ADVIL;MOTRIN) 800 MG tablet Take 1 tablet by mouth every 6 hours as needed for Pain (Patient not taking: Reported on 1/12/2022) 120 tablet 1    Diapers & Supplies MISC 1 each by Does not apply route every 4 hours for 28 days Change diaper every 4 hours. 120 each 2     No current facility-administered medications on file prior to visit. Past Medical History:   Diagnosis Date    Anxiety     Back pain     Depression     Encephalitis     Hypertension     Meningitis     [facility]aplegia ([facility] [address]     Suicidal ideation     Urinary incontinence      Past Surgical History:   Procedure Laterality Date    PRE-MALIGNANT / BENIGN SKIN LESION EXCISION      2015 left shoulder     History reviewed. No pertinent family history.   Social History     Socioeconomic History    Marital status: Single     Spouse name: Not on file    Number of children: Not on file    Years of education: Not on file    Highest education level: Not on file   Occupational History    Not on file   Tobacco Use    Smoking status: Never Smoker  Smokeless tobacco: Never Used   Vaping Use    Vaping Use: Never used   Substance and Sexual Activity    Alcohol use: No    Drug use: No    Sexual activity: Never   Other Topics Concern    Not on file   Social History Narrative    Lives in group home. Wheelchair-bound since meningitis at 13 yo     Social Determinants of Health     Financial Resource Strain:     Difficulty of Paying Living Expenses: Not on file   Food Insecurity:     Worried About Running Out of Food in the Last Year: Not on file    [place] of Food in the Last Year: Not on file   Transportation Needs:     Lack of Transportation (Medical): Not on file    Lack of Transportation (Non-Medical): Not on file   Physical Activity:     Days of Exercise per Week: Not on file    Minutes of Exercise per Session: Not on file   Stress:     Feeling of Stress : Not on file   Social Connections:     Frequency of Communication with Friends and Family: Not on file    Frequency of Social Gatherings with Friends and Family: Not on file    Attends [employer]ism Services: Not on file    Active Member of [address] [place] [address] or Organizations: Not on file    Attends Club or Organization Meetings: Not on file    Marital Status: Not on file   Intimate Partner Violence:     Fear of Current or Ex-Partner: Not on file    Emotionally Abused: Not on file    Physically Abused: Not on file    Sexually Abused: Not on file   Housing Stability:     Unable to Pay for Housing in the Last Year: Not on file    Number of [place] in the Last Year: Not on file    Unstable Housing in the Last Year: Not on file       Review of Systems    OBJECTIVE:     /74 (Site: Right Upper Arm, Position: Sitting, Cuff Size: Large Adult)   Pulse 101   Temp 97.4 °F (36.3 °C)   Ht 5' 4\" (1.626 m)   LMP  (LMP Unknown)   SpO2 96%   Breastfeeding No   BMI 34.33 kg/m²     Physical Exam    No results found for requested labs within last 30 days.      Hemoglobin A1C (%)   Date Value 06/21/2016 4.9     LDL Calculated (mg/dL)   Date Value   08/29/2019 97       Lab Results   Component Value Date    WBC 10.5 02/17/2021    WBC 10.2 06/29/2020    WBC 10.6 08/29/2019    NEUTROABS 7.2 08/29/2019    HGB 11.2 02/17/2021    HGB 10.9 06/29/2020    HGB 11.7 08/29/2019    HCT 37.6 02/17/2021    HCT 36.8 06/29/2020    HCT 36.6 08/29/2019    MCV 80.5 02/17/2021    MCV 84.0 06/29/2020    MCV 79.2 08/29/2019     02/17/2021     06/29/2020     08/29/2019    SEGSABS 6.8 02/17/2021    SEGSABS 7.1 06/29/2020    SEGSABS 7.9 08/24/2019    LYMPHSABS 2.9 02/17/2021    MONOSABS 0.6 02/17/2021    EOSABS 0.2 02/17/2021    BASOSABS 0.1 02/17/2021     Lab Results   Component Value Date    TSH 1.59 08/29/2019    TSHHS 1.100 10/01/2018     Lab Results   Component Value Date    LABALBU 3.8 06/29/2020    BILITOT 0.2 06/29/2020    AST 12 06/29/2020    ALT 9 06/29/2020    ALKPHOS 64 06/29/2020             No results found for this visit on 01/12/22. ASSESSMENT AND PLAN:     1. Moderate episode of recurrent major depressive disorder (HCC)  - busPIRone (BUSPAR) 5 MG tablet; Take 1 tablet by mouth 3 times daily  Dispense: 90 tablet; Refill: 0  - citalopram (CELEXA) 10 MG tablet; Take 1 tablet by mouth daily  Dispense: 30 tablet; Refill: 5  - Amb External Referral To Psychology    2. Anxiety  - busPIRone (BUSPAR) 5 MG tablet; Take 1 tablet by mouth 3 times daily  Dispense: 90 tablet; Refill: 0  - citalopram (CELEXA) 10 MG tablet; Take 1 tablet by mouth daily  Dispense: 30 tablet; Refill: 5  - hydrOXYzine (VISTARIL) 25 MG capsule; Take 1 capsule by mouth 2 times daily  Dispense: 30 capsule; Refill: 1  - Amb External Referral To Psychology    3. Fear for personal safety  -Patient is encouraged to seek counseling for at least herself but would likely benefit from family/couple counseling also.     4. Tooth pain with chewing  -Patient is directed to follow up with her dentist.  No focused areas of erythema noted upon examination. Patient is educated about potential complications as a result of inadequate dental care/follow up. Follow up in approximately 4 weeks for medication/anxiety re-evaluation. Care discussed with patient. Patient educated on signs and symptoms of exacerbation and when to seek further medical attention. Advised to call for any problems, questions, or concerns. Patient verbalizes understanding and agrees with plan. Medications reviewed and reconciled. Continue current medications. Appropriate prescriptions are ordered. Risks and benefits of meds are discussed. After visit summary provided.

## 2022-01-19 DIAGNOSIS — G82.20 PARAPLEGIA (HCC): Primary | ICD-10-CM

## 2022-01-19 DIAGNOSIS — R15.9 INCONTINENCE OF FECES, UNSPECIFIED FECAL INCONTINENCE TYPE: ICD-10-CM

## 2022-01-27 ENCOUNTER — VIRTUAL VISIT (OUTPATIENT)
Dept: FAMILY MEDICINE CLINIC | Age: 32
End: 2022-01-27
Payer: COMMERCIAL

## 2022-01-27 DIAGNOSIS — L89.316 PRESSURE INJURY OF DEEP TISSUE OF RIGHT BUTTOCK: Primary | ICD-10-CM

## 2022-01-27 DIAGNOSIS — M79.10 MYALGIA: ICD-10-CM

## 2022-01-27 DIAGNOSIS — G82.20 PARAPLEGIA (HCC): ICD-10-CM

## 2022-01-27 DIAGNOSIS — K08.89 TOOTH PAIN WITH CHEWING: ICD-10-CM

## 2022-01-27 PROCEDURE — G8428 CUR MEDS NOT DOCUMENT: HCPCS | Performed by: NURSE PRACTITIONER

## 2022-01-27 PROCEDURE — G8417 CALC BMI ABV UP PARAM F/U: HCPCS | Performed by: NURSE PRACTITIONER

## 2022-01-27 PROCEDURE — 1036F TOBACCO NON-USER: CPT | Performed by: NURSE PRACTITIONER

## 2022-01-27 PROCEDURE — 99214 OFFICE O/P EST MOD 30 MIN: CPT | Performed by: NURSE PRACTITIONER

## 2022-01-27 PROCEDURE — G8484 FLU IMMUNIZE NO ADMIN: HCPCS | Performed by: NURSE PRACTITIONER

## 2022-01-27 RX ORDER — IBUPROFEN 800 MG/1
800 TABLET ORAL EVERY 6 HOURS PRN
Qty: 120 TABLET | Refills: 1 | Status: SHIPPED | OUTPATIENT
Start: 2022-01-27

## 2022-01-27 ASSESSMENT — ENCOUNTER SYMPTOMS
CHEST TIGHTNESS: 0
GASTROINTESTINAL NEGATIVE: 1
COUGH: 0
CHOKING: 0
WHEEZING: 0
SHORTNESS OF BREATH: 0

## 2022-01-27 NOTE — PROGRESS NOTES
2022    TELEHEALTH EVALUATION -- Audio/Visual (During [id number] public health emergency)    HPI:    Naresh Key (:  1990) has requested an audio/video evaluation for the following concern(s):    Lay Campbell is a 33year old female who has requested a virtual visit. She is accompanied by Vy Spence from [facility]. Lay Campbell initially yelled and stated she did not want to be bothered, she was just waking up. She states the main thing that is bothering her is dental. Vy Spence stated she had called the [facility] dental clinic but has been getting the run around. She states she will call again today and see if she can get Rizwana an appointment. She states she will call back if not and request a referral for another location. Lay Campbell went to the [facility] ED a few days ago with dental pain and during that time did state that her buttock was uncomfortable. Upon examination the provider found that the healed ulcer was now a stage 2, 3 cm wound. Lay Campbell had refused to be scheduled at the wound clinic. After discussion today Rizwana stated that she would go if she could be scheduled. Vy Spence states she will contact the wound clinic and get Rizwana scheduled. She states she will contact us if she runs into a problem or needs a new referral. Lay Campbell requested a refill of ibuprofen. Review of Systems   Constitutional: Negative for activity change, appetite change, chills, diaphoresis, fatigue, fever and unexpected weight change. HENT: Positive for dental problem. Respiratory: Negative for cough, choking, chest tightness, shortness of breath and wheezing. Cardiovascular: Negative for chest pain and palpitations. Gastrointestinal: Negative. Genitourinary: Negative. Skin: Positive for wound. Psychiatric/Behavioral: Positive for dysphoric mood. Prior to Visit Medications    Medication Sig Taking?  Authorizing Provider   ibuprofen (ADVIL;MOTRIN) 800 MG tablet Take 1 tablet by mouth every 6 hours as needed for Pain Yes PRECIOUS Stratton CNP   Diapers & Supplies MISC XL diapers. Patient prefers ones that has sticky tabs on sides since she is a paraplegic. Change diaper every 4 hours. PRECIOUS Stratton CNP   busPIRone (BUSPAR) 5 MG tablet Take 1 tablet by mouth 3 times daily  PRECIOUS Stratton CNP   citalopram (CELEXA) 10 MG tablet Take 1 tablet by mouth daily  PRECIOUS Stratton CNP   hydrOXYzine (VISTARIL) 25 MG capsule Take 1 capsule by mouth 2 times daily  PRECIOUS Stratton CNP   Diapers & Supplies MISC Use prn four times daily- Adult Depends/Pull ups. PRECIOUS Stratton CNP   Incontinence Supply Disposable (DEPEND PANT LARGE) [facility]   Historical Provider, MD       Social History     Tobacco Use    Smoking status: Never Smoker    Smokeless tobacco: Never Used   Vaping Use    Vaping Use: Never used   Substance Use Topics    Alcohol use: No    Drug use: No          PHYSICAL EXAMINATION:  [ INSTRUCTIONS:  \"[x]\" Indicates a positive item  \"[]\" Indicates a negative item  -- DELETE ALL ITEMS NOT EXAMINED]  Vital Signs: (As obtained by patient/caregiver or practitioner observation)    Blood pressure-  Heart rate-    Respiratory rate-    Temperature-  Pulse oximetry-     Constitutional: [x] Appears well-developed and well-nourished [x] No apparent distress      [] Abnormal-   Mental status  [x] Alert and awake  [x] Oriented to person/place/time [x]Able to follow commands      Eyes:  EOM    []  Normal  [] Abnormal-  Sclera  [x]  Normal  [] Abnormal -         Discharge []  None visible  [] Abnormal -    HENT:   [x] Normocephalic, atraumatic.   [] Abnormal   [] Mouth/Throat: Mucous membranes are moist.     External Ears [] Normal  [] Abnormal-     Neck: [x] No visualized mass     Pulmonary/Chest: [] Respiratory effort normal.  [x] No visualized signs of difficulty breathing or respiratory distress        [] Abnormal-      Musculoskeletal:   [] Normal gait with no signs of ataxia         [] Normal range of motion of neck        [] Abnormal-       Neurological:        [x] No Facial Asymmetry (Cranial nerve 7 motor function) (limited exam to video visit)          [] No gaze palsy        [] Abnormal-         Skin:        [] No significant exanthematous lesions or discoloration noted on facial skin         [] Abnormal-            Psychiatric:       [x] Normal Affect [] No Hallucinations        [] Abnormal-     Other pertinent observable physical exam findings-     ASSESSMENT/PLAN:  1. Myalgia  - ibuprofen (ADVIL;MOTRIN) 800 MG tablet; Take 1 tablet by mouth every 6 hours as needed for Pain  Dispense: 120 tablet; Refill: 1    2. Pressure injury of deep tissue of right buttock  - get appointment scheduled at wound clinic    3. Paraplegia ([facility] [address]    4. Tooth pain with chewing  - get patient to [address] [place] [address]    Follow up on referrals  Medication refill  Continue current medication regimen  Verbalized understanding and agreement with plan      No follow-ups on file. Brandon Bonny, was evaluated through a synchronous (real-time) audio-video encounter. The patient (or guardian if applicable) is aware that this is a billable service, which includes applicable co-pays. This Virtual Visit was conducted with patient's (and/or legal guardian's) consent. The visit was conducted pursuant to the emergency declaration under the [address] [place] [address], [address] [place] [address] wa[facility] authority and the [employer] and [employer] General Act. Patient identification was verified, and a caregiver was present when appropriate. The patient was located at home in a state where the provider was licensed to provide care. Total time spent on this encounter: Not billed by time    --PRECIOUS Li CNP on 1/27/2022 at 6:42 PM    An electronic signature was used to authenticate this note.

## 2022-01-28 ENCOUNTER — HOSPITAL ENCOUNTER (OUTPATIENT)
Dept: WOUND CARE | Age: 32
Discharge: HOME | End: 2022-01-28

## 2022-02-11 ENCOUNTER — HOSPITAL ENCOUNTER (OUTPATIENT)
Dept: WOUND CARE | Age: 32
Discharge: HOME | End: 2022-02-11
Payer: COMMERCIAL

## 2022-02-11 VITALS
DIASTOLIC BLOOD PRESSURE: 68 MMHG | TEMPERATURE: 96.3 F | RESPIRATION RATE: 16 BRPM | SYSTOLIC BLOOD PRESSURE: 115 MMHG | HEART RATE: 71 BPM

## 2022-02-11 DIAGNOSIS — G82.20 PARAPLEGIA (HCC): Primary | ICD-10-CM

## 2022-02-11 DIAGNOSIS — L89.313 STAGE III PRESSURE ULCER OF RIGHT BUTTOCK (HCC): ICD-10-CM

## 2022-02-11 PROCEDURE — 87077 CULTURE AEROBIC IDENTIFY: CPT

## 2022-02-11 PROCEDURE — 11042 DBRDMT SUBQ TIS 1ST 20SQCM/<: CPT

## 2022-02-11 PROCEDURE — 99214 OFFICE O/P EST MOD 30 MIN: CPT

## 2022-02-11 PROCEDURE — 87186 SC STD MICRODIL/AGAR DIL: CPT

## 2022-02-11 PROCEDURE — 87075 CULTR BACTERIA EXCEPT BLOOD: CPT

## 2022-02-11 PROCEDURE — 87070 CULTURE OTHR SPECIMN AEROBIC: CPT

## 2022-02-11 RX ORDER — LIDOCAINE HYDROCHLORIDE 40 MG/ML
SOLUTION TOPICAL ONCE
Status: CANCELLED | OUTPATIENT
Start: 2022-02-11 | End: 2022-02-11

## 2022-02-11 RX ORDER — BETAMETHASONE DIPROPIONATE 0.05 %
OINTMENT (GRAM) TOPICAL ONCE
Status: CANCELLED | OUTPATIENT
Start: 2022-02-11 | End: 2022-02-11

## 2022-02-11 RX ORDER — GENTAMICIN SULFATE 1 MG/G
OINTMENT TOPICAL ONCE
Status: CANCELLED | OUTPATIENT
Start: 2022-02-11 | End: 2022-02-11

## 2022-02-11 RX ORDER — LIDOCAINE HYDROCHLORIDE 20 MG/ML
JELLY TOPICAL ONCE
Status: CANCELLED | OUTPATIENT
Start: 2022-02-11 | End: 2022-02-11

## 2022-02-11 RX ORDER — LIDOCAINE 50 MG/G
OINTMENT TOPICAL ONCE
Status: CANCELLED | OUTPATIENT
Start: 2022-02-11 | End: 2022-02-11

## 2022-02-11 RX ORDER — BACITRACIN ZINC AND POLYMYXIN B SULFATE 500; 1000 [USP'U]/G; [USP'U]/G
OINTMENT TOPICAL ONCE
Status: CANCELLED | OUTPATIENT
Start: 2022-02-11 | End: 2022-02-11

## 2022-02-11 RX ORDER — LIDOCAINE 40 MG/G
CREAM TOPICAL ONCE
Status: CANCELLED | OUTPATIENT
Start: 2022-02-11 | End: 2022-02-11

## 2022-02-11 RX ORDER — CLOBETASOL PROPIONATE 0.5 MG/G
OINTMENT TOPICAL ONCE
Status: CANCELLED | OUTPATIENT
Start: 2022-02-11 | End: 2022-02-11

## 2022-02-11 RX ORDER — GINSENG 100 MG
CAPSULE ORAL ONCE
Status: CANCELLED | OUTPATIENT
Start: 2022-02-11 | End: 2022-02-11

## 2022-02-11 RX ORDER — BACITRACIN, NEOMYCIN, POLYMYXIN B 400; 3.5; 5 [USP'U]/G; MG/G; [USP'U]/G
OINTMENT TOPICAL ONCE
Status: CANCELLED | OUTPATIENT
Start: 2022-02-11 | End: 2022-02-11

## 2022-02-11 ASSESSMENT — PAIN SCALES - GENERAL: PAINLEVEL_OUTOF10: 6

## 2022-02-11 ASSESSMENT — PAIN DESCRIPTION - FREQUENCY: FREQUENCY: INTERMITTENT

## 2022-02-11 ASSESSMENT — PAIN DESCRIPTION - PROGRESSION: CLINICAL_PROGRESSION: NOT CHANGED

## 2022-02-11 ASSESSMENT — PAIN DESCRIPTION - ONSET: ONSET: ON-GOING

## 2022-02-11 ASSESSMENT — PAIN DESCRIPTION - PAIN TYPE: TYPE: CHRONIC PAIN

## 2022-02-11 ASSESSMENT — PAIN - FUNCTIONAL ASSESSMENT: PAIN_FUNCTIONAL_ASSESSMENT: PREVENTS OR INTERFERES SOME ACTIVE ACTIVITIES AND ADLS

## 2022-02-11 ASSESSMENT — PAIN DESCRIPTION - ORIENTATION: ORIENTATION: LEFT

## 2022-02-11 NOTE — H&P
[facility] Initial Visit      Naun Babb  AGE: 33 y.o. GENDER: female  : 1990  EPISODE DATE:  2022   Referred by: home health    Subjective:     CHIEF COMPLAINT : non-healing ulcer on the right buttock     HISTORY of PRESENT ILLNESS      Naun Babb is a 33 y.o. female who presents to the [address] [place] [address] for an initial visit for evaluation and treatment of Chronic pressure  ulcer(s) of  Right buttocks. The condition is of moderate severity. The ulcer has been present for months. She has been seen here at the wound center for this ulcer in the past.  She is non-ambulatory and is completely chair and bed-bound and is a savanna lift- traveling is a significant problem for her. She has not been able to consistently come to the wound center and therefore has not had healing of her wound. She has a hospital bed at home, but does not have the appropriate off-loading mattress in order to help her heal the wound and then keep her skin from further breakdown. She has significant drainage from the wound. She has had her health aid manage the dressings. She has tried applying antibiotic ointment to the area, this has not helped. She now returns back to the wound center for this non-healing ulcer. The patient has significant underlying medical conditions as below. Wound Pain Timing/Severity: pain whenever she puts pressure on it.   Quality of pain: tender  Severity of pain:  2 / 10   Modifying Factors: chronic pressure, decreased mobility, obesity and incontinence of urine  Associated Signs/Symptoms: erythema, drainage and pain        PAST MEDICAL HISTORY        Diagnosis Date    Anxiety     Back pain     Depression     Encephalitis     Hypertension     Meningitis     Paraplegia ([address]     Suicidal ideation     Urinary incontinence        PAST SURGICAL HISTORY    Past Surgical History:   Procedure Laterality Date    PRE-MALIGNANT / BENIGN SKIN LESION EXCISION       left shoulder       FAMILY HISTORY    History reviewed. No pertinent family history. SOCIAL HISTORY    Social History     Tobacco Use    Smoking status: Never Smoker    Smokeless tobacco: Never Used   Vaping Use    Vaping Use: Never used   Substance Use Topics    Alcohol use: No    Drug use: No       ALLERGIES    Allergies   Allergen Reactions    Fish-Derived Products     Lemon Extract [Flavoring Agent]     Trazodone And Nefazodone      Causes nightmares         MEDICATIONS    Current Outpatient Medications on File Prior to Encounter   Medication Sig Dispense Refill    busPIRone (BUSPAR) 5 MG tablet Take 1 tablet by mouth 3 times daily 90 tablet 0    citalopram (CELEXA) 10 MG tablet Take 1 tablet by mouth daily 30 tablet 5    hydrOXYzine (VISTARIL) 25 MG capsule Take 1 capsule by mouth 2 times daily 30 capsule 1    ibuprofen (ADVIL;MOTRIN) 800 MG tablet Take 1 tablet by mouth every 6 hours as needed for Pain 120 tablet 1    Diapers & Supplies MISC XL diapers. Patient prefers ones that has sticky tabs on sides since she is a paraplegic. Change diaper every 4 hours. 120 each 12    Diapers & Supplies MISC Use prn four times daily- Adult Depends/Pull ups. 180 each 3    Incontinence Supply Disposable (DEPEND PANT LARGE) MISC   2     No current facility-administered medications on file prior to encounter. PROBLEM LIST    Patient Active Problem List   Diagnosis    Nevus    Major depressive disorder    Hallucinations    [facility]ap[facility] [address] [address]    Non-pressure chronic ulcer of buttock, limited to breakdown of skin (HCC)    Decubitus ulcer of right perineal ischial region, stage I    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)    Altered mental status    Other insomnia    Anxiety       REVIEW OF SYSTEMS    Pertinent items are noted in HPI.       Objective:      /68   Pulse 71   Temp 96.3 °F (35.7 °C) (Temporal)   Resp 16   LMP  (LMP Unknown)     PHYSICAL EXAM  GEN: Awake, oriented X3, no distress. CV: S1, S2, RRR  Lungs: CTA anteriorly  Dermatologic exam: Visual inspection of the periwound reveals the skin to be erythematous. Wound exam: see wound description below in procedure note      Assessment:       Alize Harding  appears to have a non-healing wound of the right buttock. The etiology of the wound is felt to be pressure. There are multiple complicating factors including chronic pressure, decreased mobility, obesity and incontinence of urine. A comprehensive wound management program would be helpful to heal this wound. Assessments completed include fall risk and nutritional, functional,and psychological status. Patient also requires and alternating pressure pad and pump to be used on her bed over the existing mattress due to her inability to move and off-load on her own. She is immobile due to underlying paraplegia, complicated by obesity and has a stage 3 pressure ulcer on the right buttock which has failed treatment in the past and has continued to be an issue despite previous encounters here at the wound care center. She will need periodic debridement (one done today), monitoring for infection (culture obtained today) and proper off-loading. She is complicated by underlying incontinence issues. Today, I discuss the importance of frequent changes in position (very difficult due to patients paraplegia and obesity). We discuss the importance of attempting to keep all pressure off the wound including avoiding rubbing of the skin on surfaces. We discuss the importance of keeping the urine and feces from touching the wound as well- they will incorporate barrier cream around the bandage in order to help with moisture. We discuss that she will need to be seen weekly for debridements of the wound in order to continue to help it heal and manage the dressings. At this time appropriate care would include: periodic debridement as noted above, and wound care as below. Problem List Items Addressed This Visit     WD-Paraplegia [facility]) - Primary    Relevant Orders    Culture, Wound    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)    Relevant Orders    Culture, Wound            Procedure Note    Indications:  Based on my examination of this patient's wound(s) today, sharp excision into necrotic epidermis, dermis and subcutaneous tissue is required to promote healing and evaluate the extent of previous healing. Performed by: Aminata Moore MD    Consent obtained: Yes    Time out taken:  Yes    Pain Control: none       Debridement:Excisional Debridement    Using curette the wound(s) was/were sharply debrided down through and including the removal of epidermis, dermis and subcutaneous tissue.         Devitalized Tissue Debrided:  fibrin, biofilm and slough    Pre Debridement Measurements:  Are located in the Wound Documentation Flow Sheet    All active wounds listed below with today's date are evaluated  Wound(s)    debrided this date include # : 1     Post  Debridement Measurements:      Wound 02/11/22 Buttocks Right #1 (Active)   Wound Image   02/11/22 1506   Wound Cleansed Wound cleanser 02/11/22 1506   Wound Length (cm) 3 cm 02/11/22 1506   Wound Width (cm) 3.5 cm 02/11/22 1506   Wound Depth (cm) 0.1 cm 02/11/22 1506   Wound Surface Area (cm^2) 10.5 cm^2 02/11/22 1506   Wound Volume (cm^3) 1.05 cm^3 02/11/22 1506   Post-Procedure Length (cm) 3 cm 02/11/22 1520   Post-Procedure Width (cm) 3.5 cm 02/11/22 1520   Post-Procedure Depth (cm) 0.1 cm 02/11/22 1520   Post-Procedure Surface Area (cm^2) 10.5 cm^2 02/11/22 1520   Post-Procedure Volume (cm^3) 1.05 cm^3 02/11/22 1520   Distance Tunneling (cm) 0 cm 02/11/22 1506   Tunneling Position ___ O'Clock 0 02/11/22 1506   Undermining Starts ___ O'Clock 0 02/11/22 1506   Undermining Ends___ O'Clock 00 02/11/22 1506   Undermining Maxium Distance (cm) 0 02/11/22 1506   Wound Assessment Pink/red 02/11/22 1506   Drainage Amount Moderate 02/11/22 1506   Drainage Description Serosanguinous 02/11/22 1506   Odor None 02/11/22 1506   Dyan-wound Assessment Intact 02/11/22 1506   Margins Defined edges 02/11/22 1506   Wound Thickness Description not for Pressure Injury Full thickness 02/11/22 1506   Number of days: 0       Percent of Wound(s) Debrided: 100%    Total  Area  Debrided:  10 sq cm     Bleeding:  Minimal    Hemostasis Achieved:  by pressure    Procedural Pain:  7  / 10     Post Procedural Pain:  2 / 10     Response to treatment:  With complaints of pain during debridement. Plan:     Discharge Instructions       PHYSICIAN ORDERS AND DISCHARGE INSTRUCTIONS     NOTE: Upon discharge from the [address], you will receive a patient experience survey. We would be grateful if you would take the time to fill this survey out.     Wound care order history:                 KEVIN's   Right       Left               Date:              Cultures:  Buttock wound cultured 5/21/21                                Obtained on 2/11/2022              Grafts:                Antibiotics:           Continuing wound care orders and information:                 Residence:                Continue home health care with:  [facility], supplies  Aide only not nursing  Fax [phone] [facility] Legal guardian [phone]                            Your wound-care supplies will be provided by:      Wound cleansing:               VH not scrub or use excessive force.              Wash hands with soap and water before and after dressing changes.               NFEFG to applying a clean dressing, cleanse wound with normal saline, wound cleanser, or mild soap and water.               Ask the physician or nurse before getting the wound(s) wet in a shower     Daily Wound management:              Keep weight off wounds and reposition every 2 hours.              Avoid standing for long periods of time.              KIPYW wraps/stockings in AM and remove at bedtime.              If swelling is present, elevate legs to the level of the heart or above for 30 minutes 4-5 times a day and/or when sitting.                                      When taking antibiotics take entire prescription as ordered by physician do not stop taking until medicine is all gone.                                                           Orders for this week: 2/11/21     Buttock wound cultured 5/21/21,2/11/2022     [facility] [id number]  Right Buttock - wash with soap and water, pat dry. Apply Ronald to wound bed, cover with and secure with gentac border. Change daily and as needed.     Pressure reduction will facilitate healing, reposition at least every 2 hours. [place] pressure reduction [place] [facility]         Follow up with DR Amado Persaud  in 1 weeks in the wound care center  Call [phone] for any questions or concerns.   Date__________   Time____________        Treatment Note Wound 02/11/22 Buttocks Right #1-Dressing/Treatment:  (ronald gentac border)    Written Patient Dismissal Instructions Given          Electronically signed by Vivian De MD on 2/11/2022 at 4:49 PM

## 2022-02-14 ENCOUNTER — OFFICE VISIT (OUTPATIENT)
Dept: FAMILY MEDICINE CLINIC | Age: 32
End: 2022-02-14
Payer: COMMERCIAL

## 2022-02-14 VITALS
RESPIRATION RATE: 20 BRPM | SYSTOLIC BLOOD PRESSURE: 122 MMHG | TEMPERATURE: 98.4 F | DIASTOLIC BLOOD PRESSURE: 78 MMHG | HEART RATE: 79 BPM | OXYGEN SATURATION: 99 %

## 2022-02-14 DIAGNOSIS — L89.316 PRESSURE INJURY OF DEEP TISSUE OF RIGHT BUTTOCK: ICD-10-CM

## 2022-02-14 DIAGNOSIS — F41.9 ANXIETY: ICD-10-CM

## 2022-02-14 DIAGNOSIS — L85.3 DRY SKIN: Primary | ICD-10-CM

## 2022-02-14 DIAGNOSIS — F33.1 MODERATE EPISODE OF RECURRENT MAJOR DEPRESSIVE DISORDER (HCC): ICD-10-CM

## 2022-02-14 PROCEDURE — G8417 CALC BMI ABV UP PARAM F/U: HCPCS | Performed by: NURSE PRACTITIONER

## 2022-02-14 PROCEDURE — G8427 DOCREV CUR MEDS BY ELIG CLIN: HCPCS | Performed by: NURSE PRACTITIONER

## 2022-02-14 PROCEDURE — 1036F TOBACCO NON-USER: CPT | Performed by: NURSE PRACTITIONER

## 2022-02-14 PROCEDURE — 99214 OFFICE O/P EST MOD 30 MIN: CPT | Performed by: NURSE PRACTITIONER

## 2022-02-14 PROCEDURE — G8484 FLU IMMUNIZE NO ADMIN: HCPCS | Performed by: NURSE PRACTITIONER

## 2022-02-14 RX ORDER — HYDROXYZINE PAMOATE 25 MG/1
25 CAPSULE ORAL 2 TIMES DAILY
Qty: 60 CAPSULE | Refills: 3 | Status: SHIPPED | OUTPATIENT
Start: 2022-02-14

## 2022-02-14 RX ORDER — PETROLATUM,WHITE
1 OINTMENT IN PACKET (GRAM) TOPICAL PRN
Qty: 212 G | Refills: 3 | Status: SHIPPED | OUTPATIENT
Start: 2022-02-14

## 2022-02-14 RX ORDER — BUSPIRONE HYDROCHLORIDE 5 MG/1
5 TABLET ORAL 3 TIMES DAILY
Qty: 90 TABLET | Refills: 3 | Status: SHIPPED | OUTPATIENT
Start: 2022-02-14 | End: 2022-03-16

## 2022-02-14 ASSESSMENT — PATIENT HEALTH QUESTIONNAIRE - PHQ9
SUM OF ALL RESPONSES TO PHQ QUESTIONS 1-9: 2
SUM OF ALL RESPONSES TO PHQ9 QUESTIONS 1 & 2: 2
SUM OF ALL RESPONSES TO PHQ QUESTIONS 1-9: 2
2. FEELING DOWN, DEPRESSED OR HOPELESS: 0
1. LITTLE INTEREST OR PLEASURE IN DOING THINGS: 2
SUM OF ALL RESPONSES TO PHQ QUESTIONS 1-9: 2
SUM OF ALL RESPONSES TO PHQ QUESTIONS 1-9: 2

## 2022-02-14 NOTE — PROGRESS NOTES
Mazin Vicente  1990  33 y.o. SUBJECT RAKESH:    Chief Complaint   Patient presents with    Anxiety     states that the buspar is helping. She is having increased pain in arms and chest, wanting to know if that is a side effect from the buspar. Glo Zamudio is a 33year old female who is in for follow up of anxiety. She presents in her mobile wheelchair, accompanied by a staff member. She states the medications are working well for her and she needs refills. She had some right arm pain about one month ago that lasted on/off for about 3 weeks. She states this was also around the time she started taking Buspar and is wondering if this was a side effect. She denies injury or trauma to the right arm. She describes the pain starting from the muscles of the upper right arm to elbow. She denies right arm pain today. She states her home situation is working out and she is happy. She continues to go to the wound care clinic. She states she is compliant with medications. She states she has dry skin of her feet and finds that vaseline works well. Requesting a prescription for this. Current Outpatient Medications on File Prior to Visit   Medication Sig Dispense Refill    ibuprofen (ADVIL;MOTRIN) 800 MG tablet Take 1 tablet by mouth every 6 hours as needed for Pain 120 tablet 1    Diapers & Supplies MISC XL diapers. Patient prefers ones that has sticky tabs on sides since she is a paraplegic. Change diaper every 4 hours. 120 each 12    citalopram (CELEXA) 10 MG tablet Take 1 tablet by mouth daily 30 tablet 5     No current facility-administered medications on file prior to visit.        Past Medical History:   Diagnosis Date    Anxiety     Back pain     Depression     Encephalitis     Hypertension     Meningitis     [facility]aplegia ([facility] [address]     Suicidal ideation     Urinary incontinence      Past Surgical History:   Procedure Laterality Date    PRE-MALIGNANT / BENIGN SKIN LESION EXCISION      2015 left shoulder     No family history on file. Social History     Socioeconomic History    Marital status: Single     Spouse name: Not on file    Number of children: Not on file    Years of education: Not on file    Highest education level: Not on file   Occupational History    Not on file   Tobacco Use    Smoking status: Never Smoker    Smokeless tobacco: Never Used   Vaping Use    Vaping Use: Never used   Substance and Sexual Activity    Alcohol use: No    Drug use: No    Sexual activity: Never   Other Topics Concern    Not on file   Social History Narrative    Lives in group home. Wheelchair-bound since meningitis at 13 yo     Social Determinants of Health     Financial Resource Strain:     Difficulty of Paying Living Expenses: Not on file   Food Insecurity:     Worried About Running Out of Food in the Last Year: Not on file    [place] of Food in the Last Year: Not on file   Transportation Needs:     Lack of Transportation (Medical): Not on file    Lack of Transportation (Non-Medical):  Not on file   Physical Activity:     Days of Exercise per Week: Not on file    Minutes of Exercise per Session: Not on file   Stress:     Feeling of Stress : Not on file   Social Connections:     Frequency of Communication with Friends and Family: Not on file    Frequency of Social Gatherings with Friends and Family: Not on file    Attends Jain Services: Not on file    Active Member of [address] [place] [address] [email or web address] or Organizations: Not on file    Attends Club or Organization Meetings: Not on file    Marital Status: Not on file   Intimate Partner Violence:     Fear of Current or Ex-Partner: Not on file    Emotionally Abused: Not on file    Physically Abused: Not on file    Sexually Abused: Not on file   Housing Stability:     Unable to Pay for Housing in the Last Year: Not on file    Number of [place] in the Last Year: Not on file    Unstable Housing in the Last Year: Not on file       Review of Systems Constitutional: Negative for activity change, appetite change, chills, diaphoresis, fatigue, fever and unexpected weight change. Respiratory: Negative for cough, chest tightness, shortness of breath and wheezing. Cardiovascular: Negative for chest pain and palpitations. Musculoskeletal: Positive for myalgias (upper right arm). Neurological: Negative for light-headedness and headaches. Psychiatric/Behavioral: Positive for dysphoric mood. OBJECTIVE:     /78 (Site: Right Upper Arm, Position: Sitting, Cuff Size: Large Adult)   Pulse 79   Temp 98.4 °F (36.9 °C) (Temporal)   Resp 20   SpO2 99%     Physical Exam  Vitals reviewed. Constitutional:       General: She is not in acute distress. Appearance: Normal appearance. She is well-developed. She is not ill-appearing or diaphoretic. HENT:      Head: Normocephalic and atraumatic. Right Ear: External ear normal.      Left Ear: External ear normal.      Nose: Nose normal.   Eyes:      General: No scleral icterus. Conjunctiva/sclera: Conjunctivae normal.      Pupils: Pupils are equal, round, and reactive to light. Cardiovascular:      Rate and Rhythm: Normal rate and regular rhythm. Heart sounds: Normal heart sounds. No murmur heard. No friction rub. No gallop. Pulmonary:      Effort: Pulmonary effort is normal. No respiratory distress. Breath sounds: Normal breath sounds. No wheezing. Chest:      Chest wall: No tenderness. Abdominal:      Palpations: Abdomen is soft. Musculoskeletal:         General: Normal range of motion. Cervical back: Normal range of motion and neck supple. Skin:     General: Skin is warm and dry. Neurological:      Mental Status: She is alert and oriented to person, place, and time. Psychiatric:         Behavior: Behavior normal.         Thought Content: Thought content normal.         Judgment: Judgment normal.         No results found for requested labs within last 30 days. Hemoglobin A1C (%)   Date Value   06/21/2016 4.9     LDL Calculated (mg/dL)   Date Value   08/29/2019 97       Lab Results   Component Value Date    WBC 10.5 02/17/2021    WBC 10.2 06/29/2020    WBC 10.6 08/29/2019    NEUTROABS 7.2 08/29/2019    HGB 11.2 02/17/2021    HGB 10.9 06/29/2020    HGB 11.7 08/29/2019    HCT 37.6 02/17/2021    HCT 36.8 06/29/2020    HCT 36.6 08/29/2019    MCV 80.5 02/17/2021    MCV 84.0 06/29/2020    MCV 79.2 08/29/2019     02/17/2021     06/29/2020     08/29/2019    SEGSABS 6.8 02/17/2021    SEGSABS 7.1 06/29/2020    SEGSABS 7.9 08/24/2019    LYMPHSABS 2.9 02/17/2021    MONOSABS 0.6 02/17/2021    EOSABS 0.2 02/17/2021    BASOSABS 0.1 02/17/2021     Lab Results   Component Value Date    TSH 1.59 08/29/2019    TSHHS 1.100 10/01/2018     Lab Results   Component Value Date    LABALBU 3.8 06/29/2020    BILITOT 0.2 06/29/2020    AST 12 06/29/2020    ALT 9 06/29/2020    ALKPHOS 64 06/29/2020             No results found for this visit on 02/14/22. ASSESSMENT AND PLAN:     1. Moderate episode of recurrent major depressive disorder ([facility] [address]  - medication is helping, continue current regimen  - busPIRone (BUSPAR) 5 MG tablet; Take 1 tablet by mouth 3 times daily  Dispense: 90 tablet; Refill: 3    2. Anxiety  - continue current medication regimen  - busPIRone (BUSPAR) 5 MG tablet; Take 1 tablet by mouth 3 times daily  Dispense: 90 tablet; Refill: 3  - hydrOXYzine (VISTARIL) 25 MG capsule; Take 1 capsule by mouth 2 times daily  Dispense: 60 capsule; Refill: 3    3. Dry skin  - white petrolatum (VASELINE) GEL; Apply 28 g topically as needed for Dry Skin  Dispense: 212 g; Refill: 3    4. Pressure ulcer, right buttock    Keep appointment with wound clinic  Verbalized understanding and agreement with plan    Spent 35 minutes, chart review, patient assessment, verifying plan, closing chart.     Return in about 4 months (around 6/14/2022) for anxiety; , Medication Re-Evaluation. Care discussed with patient. Patient educated on signs and symptoms of exacerbation and when to seek further medical attention. Advised to call for any problems, questions, or concerns. Patient verbalizes understanding and agrees with plan. Medications reviewed and reconciled. Continue current medications. Appropriate prescriptions are ordered. Risks and benefits of meds are discussed. After visit summary provided.

## 2022-02-15 ASSESSMENT — ENCOUNTER SYMPTOMS
WHEEZING: 0
SHORTNESS OF BREATH: 0
CHEST TIGHTNESS: 0
COUGH: 0

## 2022-02-17 LAB
CULTURE: ABNORMAL
CULTURE: ABNORMAL
Lab: ABNORMAL
SPECIMEN: ABNORMAL

## 2022-03-03 ENCOUNTER — TELEPHONE (OUTPATIENT)
Dept: WOUND CARE | Age: 32
End: 2022-03-03

## 2022-03-11 ENCOUNTER — HOSPITAL ENCOUNTER (OUTPATIENT)
Dept: WOUND CARE | Age: 32
Discharge: HOME | End: 2022-03-11
Payer: COMMERCIAL

## 2022-03-11 VITALS
DIASTOLIC BLOOD PRESSURE: 78 MMHG | RESPIRATION RATE: 16 BRPM | SYSTOLIC BLOOD PRESSURE: 111 MMHG | TEMPERATURE: 98 F | HEART RATE: 94 BPM

## 2022-03-11 DIAGNOSIS — L89.313 STAGE III PRESSURE ULCER OF RIGHT BUTTOCK (HCC): ICD-10-CM

## 2022-03-11 DIAGNOSIS — G82.20 PARAPLEGIA (HCC): Primary | ICD-10-CM

## 2022-03-11 PROCEDURE — 87076 CULTURE ANAEROBE IDENT EACH: CPT

## 2022-03-11 PROCEDURE — 11042 DBRDMT SUBQ TIS 1ST 20SQCM/<: CPT

## 2022-03-11 PROCEDURE — 87075 CULTR BACTERIA EXCEPT BLOOD: CPT

## 2022-03-11 PROCEDURE — 87077 CULTURE AEROBIC IDENTIFY: CPT

## 2022-03-11 PROCEDURE — 87181 SC STD AGAR DILUTION PER AGT: CPT

## 2022-03-11 PROCEDURE — 87070 CULTURE OTHR SPECIMN AEROBIC: CPT

## 2022-03-11 PROCEDURE — 87186 SC STD MICRODIL/AGAR DIL: CPT

## 2022-03-11 RX ORDER — LIDOCAINE 40 MG/G
CREAM TOPICAL ONCE
Status: CANCELLED | OUTPATIENT
Start: 2022-03-11 | End: 2022-03-11

## 2022-03-11 RX ORDER — LIDOCAINE HYDROCHLORIDE 20 MG/ML
JELLY TOPICAL ONCE
Status: CANCELLED | OUTPATIENT
Start: 2022-03-11 | End: 2022-03-11

## 2022-03-11 RX ORDER — BACITRACIN ZINC AND POLYMYXIN B SULFATE 500; 1000 [USP'U]/G; [USP'U]/G
OINTMENT TOPICAL ONCE
Status: CANCELLED | OUTPATIENT
Start: 2022-03-11 | End: 2022-03-11

## 2022-03-11 RX ORDER — BETAMETHASONE DIPROPIONATE 0.05 %
OINTMENT (GRAM) TOPICAL ONCE
Status: CANCELLED | OUTPATIENT
Start: 2022-03-11 | End: 2022-03-11

## 2022-03-11 RX ORDER — GENTAMICIN SULFATE 1 MG/G
OINTMENT TOPICAL ONCE
Status: CANCELLED | OUTPATIENT
Start: 2022-03-11 | End: 2022-03-11

## 2022-03-11 RX ORDER — CLOBETASOL PROPIONATE 0.5 MG/G
OINTMENT TOPICAL ONCE
Status: CANCELLED | OUTPATIENT
Start: 2022-03-11 | End: 2022-03-11

## 2022-03-11 RX ORDER — BACITRACIN, NEOMYCIN, POLYMYXIN B 400; 3.5; 5 [USP'U]/G; MG/G; [USP'U]/G
OINTMENT TOPICAL ONCE
Status: CANCELLED | OUTPATIENT
Start: 2022-03-11 | End: 2022-03-11

## 2022-03-11 RX ORDER — LIDOCAINE HYDROCHLORIDE 40 MG/ML
SOLUTION TOPICAL ONCE
Status: CANCELLED | OUTPATIENT
Start: 2022-03-11 | End: 2022-03-11

## 2022-03-11 RX ORDER — LIDOCAINE 50 MG/G
OINTMENT TOPICAL ONCE
Status: CANCELLED | OUTPATIENT
Start: 2022-03-11 | End: 2022-03-11

## 2022-03-11 RX ORDER — GINSENG 100 MG
CAPSULE ORAL ONCE
Status: CANCELLED | OUTPATIENT
Start: 2022-03-11 | End: 2022-03-11

## 2022-03-11 ASSESSMENT — PAIN DESCRIPTION - PROGRESSION: CLINICAL_PROGRESSION: NOT CHANGED

## 2022-03-11 ASSESSMENT — PAIN SCALES - GENERAL: PAINLEVEL_OUTOF10: 6

## 2022-03-11 ASSESSMENT — PAIN - FUNCTIONAL ASSESSMENT: PAIN_FUNCTIONAL_ASSESSMENT: PREVENTS OR INTERFERES SOME ACTIVE ACTIVITIES AND ADLS

## 2022-03-11 ASSESSMENT — PAIN DESCRIPTION - ONSET: ONSET: ON-GOING

## 2022-03-11 ASSESSMENT — PAIN DESCRIPTION - LOCATION: LOCATION: BUTTOCKS

## 2022-03-11 ASSESSMENT — PAIN DESCRIPTION - FREQUENCY: FREQUENCY: INTERMITTENT

## 2022-03-11 ASSESSMENT — PAIN DESCRIPTION - DESCRIPTORS: DESCRIPTORS: SHARP

## 2022-03-11 ASSESSMENT — PAIN DESCRIPTION - PAIN TYPE: TYPE: CHRONIC PAIN

## 2022-03-11 ASSESSMENT — PAIN DESCRIPTION - ORIENTATION: ORIENTATION: LEFT

## 2022-03-11 NOTE — PROGRESS NOTES
Wound Care Center Progress Note With Procedure    Francisco Carvajal  AGE: 33 y.o. GENDER: female  : 1990  EPISODE DATE:  3/11/2022     Subjective:     Chief Complaint   Patient presents with    Wound Check     b uttocks         HISTORY of PRESENT ILLNESS      Francisco Carvajal is a 33 y.o. female who presents today for wound evaluation of Chronic pressure ulcer(s) of the sacral region. The ulcer is of mild severity. The underlying cause of the wound is pressure and shearing. She is in for f/u- today, her main concern is her bilateral leg pain, she tells me that it is so bad that she would rather just have them cut off. I discuss with her that it would be appropriate to seek the advice of her PCP and possibly get referral to neurologist for this pain and that leg amputation is not the appropriate answer at this time. In terms of her wound on her sacral region- her Aid states that it had gotten better, but they ran out of supplies and now it is back to open again. She complains of pain in the wound as well. She has not used any barrier cream once the wound has healed in the past.  She has not had any off-loading mattress or hospital bed. Of note, there is a lot of disagreement between patient, her wife and her aid. No one is on \"the same page\" with regards to her medical care. I do discuss with her that I need to see her regularly in order to properly advise and care for her wounds.   Wound Pain Timing/Severity: mild  Quality of pain: tender  Severity of pain:  2 / 10   Modifying Factors: chronic pressure, decreased mobility and obesity  Associated Signs/Symptoms: none        PAST MEDICAL HISTORY        Diagnosis Date    Anxiety     Back pain     Depression     Encephalitis     Hypertension     Meningitis     Paraplegia (HCC)     Suicidal ideation     Urinary incontinence        PAST SURGICAL HISTORY    Past Surgical History:   Procedure Laterality Date    PRE-MALIGNANT / BENIGN SKIN LESION EXCISION      2015 left shoulder       FAMILY HISTORY    History reviewed. No pertinent family history. SOCIAL HISTORY    Social History     Tobacco Use    Smoking status: Never Smoker    Smokeless tobacco: Never Used   Vaping Use    Vaping Use: Never used   Substance Use Topics    Alcohol use: No    Drug use: No       ALLERGIES    Allergies   Allergen Reactions    Fish-Derived Products     Lemon Extract [Flavoring Agent]     Milk-Related Compounds     Trazodone And Nefazodone      Causes nightmares         MEDICATIONS    Current Outpatient Medications on File Prior to Encounter   Medication Sig Dispense Refill    busPIRone (BUSPAR) 5 MG tablet Take 1 tablet by mouth 3 times daily 90 tablet 3    hydrOXYzine (VISTARIL) 25 MG capsule Take 1 capsule by mouth 2 times daily 60 capsule 3    citalopram (CELEXA) 10 MG tablet Take 1 tablet by mouth daily 30 tablet 5    white petrolatum (VASELINE) GEL Apply 28 g topically as needed for Dry Skin 212 g 3    ibuprofen (ADVIL;MOTRIN) 800 MG tablet Take 1 tablet by mouth every 6 hours as needed for Pain 120 tablet 1    Diapers & Supplies MISC XL diapers. Patient prefers ones that has sticky tabs on sides since she is a paraplegic. Change diaper every 4 hours. 120 each 12     No current facility-administered medications on file prior to encounter. REVIEW OF SYSTEMS    Pertinent items are noted in HPI. Constitutional: Negative for systemic symptoms including fever, chills and malaise. Objective:      /78   Pulse 94   Temp 98 °F (36.7 °C) (Temporal)   Resp 16     PHYSICAL EXAM       General: The patient is in no acute distress. Mental status:  Patient is appropriate, is  oriented to place and plan of care.   Dermatologic exam: Visual inspection of the periwound reveals the skin to be normal in turgor and texture  Wound exam: see wound description below in procedure note      Assessment:     Problem List Items Addressed This Visit WD-Paraplegia ([facility] [address] - Primary    WD-Stage III pressure ulcer of right buttock ([facility] [address]    Relevant Orders    Initiate Outpatient Wound Care Protocol        Procedure Note    Indications:  Based on my examination of this patient's wound(s) today, sharp excision into necrotic epidermis, dermis and subcutaneous tissue is required to promote healing and evaluate the extent of previous healing. Performed by: Eliazar Feliciano MD    Consent obtained: Yes    Time out taken:  Yes    Pain Control: topical lidocaine       Debridement:Excisional Debridement    Using curette the wound(s) was/were sharply debrided down through and including the removal of epidermis, dermis and subcutaneous tissue. Devitalized Tissue Debrided:  fibrin, biofilm and slough    Pre Debridement Measurements:  Are located in the Wound Documentation Flow Sheet    All active wounds listed below with today's date are evaluated  Wound(s)    debrided this date include # : 1     Post  Debridement Measurements:  Wound 02/11/19 WOUND #1 RIGHT POSTERIOR THIGH pressure 1 (Active)   Number of days: 1124       Wound 05/21/21 Buttocks Right Right Ischium  (Active)   Number of days: 294       Wound 02/11/22 #1 Right Buttock (Active)   Wound Image   02/11/22 1506   Dressing Status Clean;Dry; Intact; New dressing applied 03/11/22 1357   Wound Cleansed Wound cleanser 03/11/22 1325   Wound Length (cm) 1 cm 03/11/22 1325   Wound Width (cm) 0.8 cm 03/11/22 1325   Wound Depth (cm) 0.1 cm 03/11/22 1325   Wound Surface Area (cm^2) 0.8 cm^2 03/11/22 1325   Change in Wound Size % (l*w) 92.38 03/11/22 1325   Wound Volume (cm^3) 0.08 cm^3 03/11/22 1325   Wound Healing % 92 03/11/22 1325   Post-Procedure Length (cm) 2.1 cm 03/11/22 1344   Post-Procedure Width (cm) 0.8 cm 03/11/22 1344   Post-Procedure Depth (cm) 0.1 cm 03/11/22 1344   Post-Procedure Surface Area (cm^2) 1.68 cm^2 03/11/22 1344   Post-Procedure Volume (cm^3) 0.168 cm^3 03/11/22 1344   Distance Tunneling (cm) 0 cm 03/11/22 1325   Tunneling Position ___ O'Clock 0 03/11/22 1325   Undermining Starts ___ O'Clock 0 03/11/22 1325   Undermining Ends___ O'Clock 0 03/11/22 1325   Undermining Maxium Distance (cm) 0 03/11/22 1325   Wound Assessment Pink/red 03/11/22 1325   Drainage Amount Moderate 03/11/22 1325   Drainage Description Yellow 03/11/22 1325   Odor None 03/11/22 1325   Dyan-wound Assessment Fragile 03/11/22 1325   Margins Defined edges 03/11/22 1325   Wound Thickness Description not for Pressure Injury Full thickness 03/11/22 1325   Number of days: 27       Percent of Wound(s) Debrided: approximately 100%    Total  Area  Debrided:  0.8 sq cm     Bleeding:  Minimal    Hemostasis Achieved:  by pressure    Procedural Pain:  4 / 10     Post Procedural Pain:  2 / 10     Response to treatment:  Well tolerated by patient. Status of wound progress and description from last visit:   About the same- still with signs of pressure and shearing. I did take a culture today due to the redness. Plan:       Discharge Instructions         Discharge Instructions        PHYSICIAN ORDERS AND DISCHARGE INSTRUCTIONS     NOTE: Upon discharge from the [address], you will receive a patient experience survey.  We would be grateful if you would take the time to fill this survey out.     Wound care order history:                 KEVIN's   Right       Left               Date:              Cultures:  Buttock wound cultured 5/21/21                                Obtained on 2/11/2022                                Cultured on 3/11/2022              Grafts:                Antibiotics:           Continuing wound care orders and information:                 Residence:                Continue home health care with:  [facility], supplies  Aide only not nursing  Fax [phone] [employer] guardian [phone]                            Your wound-care supplies will be provided by:      Wound cleansing:             HC not scrub or use excessive force.              Wash hands with soap and water before and after dressing changes.             CWBFM to applying a clean dressing, cleanse wound with normal saline, wound cleanser, or mild soap and water.               Ask the physician or nurse before getting the wound(s) wet in a shower     Daily Wound management:              Keep weight off wounds and reposition every 2 hours.              Avoid standing for long periods of time.              Apply wraps/stockings in AM and remove at bedtime.              If swelling is present, elevate legs to the level of the heart or above for 30 minutes 4-5 times a day and/or when sitting.                                      When taking antibiotics take entire prescription as ordered by physician do not stop taking until medicine is all gone.                                                           Orders for this week: 3/11/21     Buttock wound cultured 5/21/21,2/11/2022     Rx: [facility] [id number]  Right Buttock - wash with soap and water, pat dry. Apply Ronald to wound bed, cover with and secure with gentac border. Change daily and as needed.     Pressure reduction will facilitate healing, reposition at least every 2 hours. [place] [facility]         Follow up with DR Ishmael Fragoso  in 2 weeks in the wound care center  Call [phone] for any questions or concerns.   Date__________   Time____________          Treatment Note Wound 02/11/22 #1 Right Buttock-Dressing/Treatment:  (ronald gentac border)    Written Patient Dismissal Instructions Given            Electronically signed by Mahamed Verdin MD on 3/11/2022 at 2:25 PM

## 2022-03-11 NOTE — PLAN OF CARE
Problem: Pain:  Goal: Pain level will decrease  Description: Pain level will decrease  3/11/2022 1418 by Partha Sommers RN  Outcome: Ongoing  3/11/2022 1358 by Aleksandr Griggs RN  Outcome: Ongoing  Goal: Control of acute pain  Description: Control of acute pain  3/11/2022 1418 by Partha Sommers RN  Outcome: Ongoing  3/11/2022 1358 by Aleksandr Griggs RN  Outcome: Ongoing  Goal: Control of chronic pain  Description: Control of chronic pain  3/11/2022 1418 by Partha Sommers RN  Outcome: Ongoing  3/11/2022 1358 by Aleksandr Griggs RN  Outcome: Ongoing     Problem: Wound:  Goal: Will show signs of wound healing; wound closure and no evidence of infection  Description: Will show signs of wound healing; wound closure and no evidence of infection  Outcome: Ongoing     Problem: Wound:  Intervention: Assess pain status  Note: See flowsheet  Intervention: Assess wound size, appearance and drainage  Note: See flowsheet

## 2022-03-19 LAB
CULTURE: ABNORMAL
Lab: ABNORMAL
SPECIMEN: ABNORMAL

## 2022-03-25 ENCOUNTER — HOSPITAL ENCOUNTER (OUTPATIENT)
Dept: WOUND CARE | Age: 32
Discharge: HOME | End: 2022-03-25
Payer: COMMERCIAL

## 2022-03-25 ENCOUNTER — TELEPHONE (OUTPATIENT)
Dept: WOUND CARE | Age: 32
End: 2022-03-25

## 2022-03-25 VITALS
HEART RATE: 77 BPM | SYSTOLIC BLOOD PRESSURE: 104 MMHG | DIASTOLIC BLOOD PRESSURE: 72 MMHG | TEMPERATURE: 96.5 F | RESPIRATION RATE: 16 BRPM

## 2022-03-25 DIAGNOSIS — L89.313 STAGE III PRESSURE ULCER OF RIGHT BUTTOCK (HCC): Primary | ICD-10-CM

## 2022-03-25 PROCEDURE — 99213 OFFICE O/P EST LOW 20 MIN: CPT

## 2022-03-25 RX ORDER — GENTAMICIN SULFATE 1 MG/G
OINTMENT TOPICAL ONCE
Status: CANCELLED | OUTPATIENT
Start: 2022-03-25 | End: 2022-03-25

## 2022-03-25 RX ORDER — BETAMETHASONE DIPROPIONATE 0.05 %
OINTMENT (GRAM) TOPICAL ONCE
Status: CANCELLED | OUTPATIENT
Start: 2022-03-25 | End: 2022-03-25

## 2022-03-25 RX ORDER — GINSENG 100 MG
CAPSULE ORAL ONCE
Status: CANCELLED | OUTPATIENT
Start: 2022-03-25 | End: 2022-03-25

## 2022-03-25 RX ORDER — LIDOCAINE 40 MG/G
CREAM TOPICAL ONCE
Status: CANCELLED | OUTPATIENT
Start: 2022-03-25 | End: 2022-03-25

## 2022-03-25 RX ORDER — BACITRACIN ZINC AND POLYMYXIN B SULFATE 500; 1000 [USP'U]/G; [USP'U]/G
OINTMENT TOPICAL ONCE
Status: CANCELLED | OUTPATIENT
Start: 2022-03-25 | End: 2022-03-25

## 2022-03-25 RX ORDER — LIDOCAINE HYDROCHLORIDE 20 MG/ML
JELLY TOPICAL ONCE
Status: CANCELLED | OUTPATIENT
Start: 2022-03-25 | End: 2022-03-25

## 2022-03-25 RX ORDER — CLOBETASOL PROPIONATE 0.5 MG/G
OINTMENT TOPICAL ONCE
Status: CANCELLED | OUTPATIENT
Start: 2022-03-25 | End: 2022-03-25

## 2022-03-25 RX ORDER — BACITRACIN, NEOMYCIN, POLYMYXIN B 400; 3.5; 5 [USP'U]/G; MG/G; [USP'U]/G
OINTMENT TOPICAL ONCE
Status: CANCELLED | OUTPATIENT
Start: 2022-03-25 | End: 2022-03-25

## 2022-03-25 RX ORDER — LIDOCAINE 50 MG/G
OINTMENT TOPICAL ONCE
Status: CANCELLED | OUTPATIENT
Start: 2022-03-25 | End: 2022-03-25

## 2022-03-25 RX ORDER — LIDOCAINE HYDROCHLORIDE 40 MG/ML
SOLUTION TOPICAL ONCE
Status: CANCELLED | OUTPATIENT
Start: 2022-03-25 | End: 2022-03-25

## 2022-03-25 NOTE — PLAN OF CARE
Problem: Wound:  Goal: Will show signs of wound healing; wound closure and no evidence of infection  Description: Will show signs of wound healing; wound closure and no evidence of infection  Outcome: Ongoing     Problem: Wound:  Intervention: Assess pain status  Note: See flowsheet  Intervention: Assess wound size, appearance and drainage  Note: See flowsheet

## 2022-03-25 NOTE — PROGRESS NOTES
Wound Care Center Progress Note       Darshan Byrne  AGE: 33 y.o. GENDER: female  : 1990  TODAY'S DATE:  3/25/2022        Subjective:     Chief Complaint   Patient presents with    Wound Check     right buttock         HISTORY of PRESENT ILLNESS     Darshan Byrne is a 33 y.o. female who presents today for wound evaluation of Chronic pressure ulcer(s) of right buttock. The ulcer is of mild severity. The underlying cause of the wound is pressure from scooting and laying in one place. She STILL does not have the right kind of mattress, her aid states that she is becoming more sedentary as well. She thinks that this week, the dressing was improperly managed, causing the wound to get much worse. She notes its better than on Monday, but it is measuring larger today. Wound Pain Timing/Severity: mild  Quality of pain: tender  Severity of pain:  1 / 10   Modifying Factors: chronic pressure and decreased mobility  Obesity  Associated Signs/Symptoms: none        PAST MEDICAL HISTORY        Diagnosis Date    Anxiety     Back pain     Depression     Encephalitis     Hypertension     Meningitis     Paraplegia (HCC)     Suicidal ideation     Urinary incontinence        PAST SURGICAL HISTORY    Past Surgical History:   Procedure Laterality Date    PRE-MALIGNANT / BENIGN SKIN LESION EXCISION       left shoulder       FAMILY HISTORY    History reviewed. No pertinent family history.     SOCIAL HISTORY    Social History     Tobacco Use    Smoking status: Never Smoker    Smokeless tobacco: Never Used   Vaping Use    Vaping Use: Never used   Substance Use Topics    Alcohol use: No    Drug use: No       ALLERGIES    Allergies   Allergen Reactions    Fish-Derived Products     Lemon Extract [Flavoring Agent]     Milk-Related Compounds     Trazodone And Nefazodone      Causes nightmares         MEDICATIONS    Current Outpatient Medications on File Prior to Encounter   Medication Sig Dispense Refill    hydrOXYzine (VISTARIL) 25 MG capsule Take 1 capsule by mouth 2 times daily (Patient not taking: Reported on 3/25/2022) 60 capsule 3    white petrolatum (VASELINE) GEL Apply 28 g topically as needed for Dry Skin 212 g 3    ibuprofen (ADVIL;MOTRIN) 800 MG tablet Take 1 tablet by mouth every 6 hours as needed for Pain (Patient not taking: Reported on 3/25/2022) 120 tablet 1    Diapers & Supplies MISC XL diapers. Patient prefers ones that has sticky tabs on sides since she is a paraplegic. Change diaper every 4 hours. (Patient not taking: Reported on 3/25/2022) 120 each 12    citalopram (CELEXA) 10 MG tablet Take 1 tablet by mouth daily (Patient not taking: Reported on 3/25/2022) 30 tablet 5     No current facility-administered medications on file prior to encounter. REVIEW OF SYSTEMS    Pertinent items are noted in HPI. Constitutional: Negative for systemic symptoms including fever, chills and malaise. Objective:      /72   Pulse 77   Temp 96.5 °F (35.8 °C) (Temporal)   Resp 16     PHYSICAL EXAM       General: The patient is in no acute distress. Mental status:  Patient is appropriate, is  oriented to place and plan of care. Dermatologic exam: Visual inspection of the periwound reveals the skin to be normal in turgor and texture. Wound exam:  see wound description below     All active wounds listed below with today's date are evaluated      Wound 02/11/19 WOUND #1 RIGHT POSTERIOR THIGH pressure 1 (Active)   Number of days: 1138       Wound 05/21/21 Buttocks Right Right Ischium  (Active)   Number of days: 308       Wound 02/11/22 #1 Right Buttock (Active)   Wound Image   03/25/22 1315   Wound Etiology Pressure Stage  2 03/25/22 1315   Dressing Status Clean;Dry; Intact; New dressing applied 03/25/22 1332   Wound Cleansed Wound cleanser 03/25/22 1315   Dressing/Treatment Alginate;Silicone border 13/08/77 1332   Offloading for Diabetic Foot Ulcers Other (comment) 03/25/22 1315 Wound Length (cm) 1.6 cm 03/25/22 1315   Wound Width (cm) 1.2 cm 03/25/22 1315   Wound Depth (cm) 0.1 cm 03/25/22 1315   Wound Surface Area (cm^2) 1.92 cm^2 03/25/22 1315   Change in Wound Size % (l*w) 81.71 03/25/22 1315   Wound Volume (cm^3) 0.192 cm^3 03/25/22 1315   Wound Healing % 82 03/25/22 1315   Post-Procedure Length (cm) 1.6 cm 03/25/22 1330   Post-Procedure Width (cm) 1.2 cm 03/25/22 1330   Post-Procedure Depth (cm) 0.1 cm 03/25/22 1330   Post-Procedure Surface Area (cm^2) 1.92 cm^2 03/25/22 1330   Post-Procedure Volume (cm^3) 0.192 cm^3 03/25/22 1330   Distance Tunneling (cm) 0 cm 03/25/22 1315   Tunneling Position ___ O'Clock 0 03/25/22 1315   Undermining Starts ___ O'Clock 0 03/25/22 1315   Undermining Ends___ O'Clock 0 03/25/22 1315   Undermining Maxium Distance (cm) 0 03/25/22 1315   Wound Assessment Pink/red 03/25/22 1315   Drainage Amount Moderate 03/25/22 1315   Drainage Description Serous 03/25/22 1315   Odor None 03/25/22 1315   Dyan-wound Assessment Fragile 03/25/22 1315   Margins Defined edges; Unattached edges 03/25/22 1315   Wound Thickness Description not for Pressure Injury Full thickness 03/25/22 1315   Number of days: 41       Assessment:     Wound is worse today- appears that it is pressure and shearing  Problem List Items Addressed This Visit     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) - Primary    Relevant Orders    Initiate Outpatient Wound Care Protocol          Status of wound progress and description from last visit:   worse. Plan:     Discharge Instructions       PHYSICIAN ORDERS AND DISCHARGE INSTRUCTIONS     NOTE: Upon discharge from the [address], you will receive a patient experience survey.  We would be grateful if you would take the time to fill this survey out.     Wound care order history:                 KEVIN's   Right       Left               Date:              Cultures:  Buttock wound cultured 5/21/21                                Obtained on 2/11/2022                                Cultured on 3/11/2022              Grafts:                Antibiotics:           Continuing wound care orders and information:                 Residence:                Continue home health care with: [facility], supplies  Aide only not nursing  Fax #[phone]                [facility] [address] [place] [address] guardian [phone]              Your wound-care supplies will be provided by:      Wound cleansing:               FY not scrub or use excessive force.              Wash hands with soap and water before and after dressing changes.             EHTXJ to applying a clean dressing, cleanse wound with normal saline, wound cleanser, or mild soap and water.               Ask the physician or nurse before getting the wound(s) wet in a shower     Daily Wound management:              Keep weight off wounds and reposition every 2 hours.              Avoid standing for long periods of time.              Apply wraps/stockings in AM and remove at bedtime.              If swelling is present, elevate legs to the level of the heart or above for 30 minutes 4-5 times a day and/or when sitting.                                      When taking antibiotics take entire prescription as ordered by physician do not stop taking until medicine is all gone.                                                           Orders for this week: 3/25/21     Buttock wound cultured 5/21/21,2/11/2022     Rx: [facility] [address] [place] [address]  Right Buttock - wash with soap and water, pat dry. Apply ca alginate to wound bed. Cover with and secure with 4x4 Gentac border. Change daily and as needed.     Pressure reduction will facilitate healing, turn and reposition at least every 2 hours. Sent for pressure reduction mattress/pad from [address] [facility].        Follow up with Dr Shun Barksdale 2 weeks in the wound care center.   Call [phone] for any questions or concerns.   Date__________   Time___________        Treatment Note Wound 02/11/22 #1 Right Buttock-Dressing/Treatment: Alginate,Silicone border (calcium alginate,  gentac border,)    Written Patient Dismissal Instructions Given            Electronically signed by Lianna Judge MD on 3/25/2022 at 1:52 PM

## 2022-03-25 NOTE — TELEPHONE ENCOUNTER
[address]  [facility] [address] about pressure reducing mattress/pad on 3/25/22. Spoke with Vanesa Cruz, who said she was having issues contacting client (not answering/couldn't leave message). SN gave her the number of client's Support Staff  Lois Pang [phone].     Electronically signed by Giovanna Atkinson RN on 3/25/2022 at 1:54 PM

## 2022-04-08 ENCOUNTER — HOSPITAL ENCOUNTER (OUTPATIENT)
Dept: WOUND CARE | Age: 32
Discharge: HOME | End: 2022-04-08
Payer: COMMERCIAL

## 2022-04-08 VITALS
SYSTOLIC BLOOD PRESSURE: 111 MMHG | RESPIRATION RATE: 16 BRPM | TEMPERATURE: 98.3 F | HEART RATE: 64 BPM | DIASTOLIC BLOOD PRESSURE: 74 MMHG

## 2022-04-08 DIAGNOSIS — G82.20 PARAPLEGIA (HCC): ICD-10-CM

## 2022-04-08 DIAGNOSIS — L89.313 STAGE III PRESSURE ULCER OF RIGHT BUTTOCK (HCC): Primary | ICD-10-CM

## 2022-04-08 PROCEDURE — 11042 DBRDMT SUBQ TIS 1ST 20SQCM/<: CPT

## 2022-04-08 RX ORDER — LIDOCAINE 50 MG/G
OINTMENT TOPICAL ONCE
Status: CANCELLED | OUTPATIENT
Start: 2022-04-08 | End: 2022-04-08

## 2022-04-08 RX ORDER — BACITRACIN ZINC AND POLYMYXIN B SULFATE 500; 1000 [USP'U]/G; [USP'U]/G
OINTMENT TOPICAL ONCE
Status: CANCELLED | OUTPATIENT
Start: 2022-04-08 | End: 2022-04-08

## 2022-04-08 RX ORDER — BACITRACIN, NEOMYCIN, POLYMYXIN B 400; 3.5; 5 [USP'U]/G; MG/G; [USP'U]/G
OINTMENT TOPICAL ONCE
Status: CANCELLED | OUTPATIENT
Start: 2022-04-08 | End: 2022-04-08

## 2022-04-08 RX ORDER — GINSENG 100 MG
CAPSULE ORAL ONCE
Status: CANCELLED | OUTPATIENT
Start: 2022-04-08 | End: 2022-04-08

## 2022-04-08 RX ORDER — LIDOCAINE HYDROCHLORIDE 20 MG/ML
JELLY TOPICAL ONCE
Status: CANCELLED | OUTPATIENT
Start: 2022-04-08 | End: 2022-04-08

## 2022-04-08 RX ORDER — LIDOCAINE HYDROCHLORIDE 40 MG/ML
SOLUTION TOPICAL ONCE
Status: CANCELLED | OUTPATIENT
Start: 2022-04-08 | End: 2022-04-08

## 2022-04-08 RX ORDER — LIDOCAINE 40 MG/G
CREAM TOPICAL ONCE
Status: CANCELLED | OUTPATIENT
Start: 2022-04-08 | End: 2022-04-08

## 2022-04-08 RX ORDER — GENTAMICIN SULFATE 1 MG/G
OINTMENT TOPICAL ONCE
Status: CANCELLED | OUTPATIENT
Start: 2022-04-08 | End: 2022-04-08

## 2022-04-08 RX ORDER — BETAMETHASONE DIPROPIONATE 0.05 %
OINTMENT (GRAM) TOPICAL ONCE
Status: CANCELLED | OUTPATIENT
Start: 2022-04-08 | End: 2022-04-08

## 2022-04-08 RX ORDER — CLOBETASOL PROPIONATE 0.5 MG/G
OINTMENT TOPICAL ONCE
Status: CANCELLED | OUTPATIENT
Start: 2022-04-08 | End: 2022-04-08

## 2022-04-08 NOTE — PROGRESS NOTES
Wound Care Center Progress Note With Procedure    Wendy Montague  AGE: 33 y.o. GENDER: female  : 1990  EPISODE DATE:  2022     Subjective:     Chief Complaint   Patient presents with    Wound Check     LLE         HISTORY of PRESENT ILLNESS      Wendy Montague is a 33 y.o. female who presents today for wound evaluation of Chronic pressure ulcer(s) of the right buttock  The ulcer is of moderate severity. The underlying cause of the wound is pressure and shearing. She presents after having no supplies over the past 2 weeks. We will try again to get her supplies as this is necessary to heal her wounds. The wound has not improved. She has however, gotten her mattress. Wound Pain Timing/Severity: mild  Quality of pain: N/A  Severity of pain:  0 / 10   Modifying Factors: edema  Associated Signs/Symptoms: none        PAST MEDICAL HISTORY        Diagnosis Date    Anxiety     Back pain     Depression     Encephalitis     Hypertension     Meningitis     Paraplegia (HCC)     Suicidal ideation     Urinary incontinence        PAST SURGICAL HISTORY    Past Surgical History:   Procedure Laterality Date    PRE-MALIGNANT / BENIGN SKIN LESION EXCISION       left shoulder       FAMILY HISTORY    No family history on file.     SOCIAL HISTORY    Social History     Tobacco Use    Smoking status: Never Smoker    Smokeless tobacco: Never Used   Vaping Use    Vaping Use: Never used   Substance Use Topics    Alcohol use: No    Drug use: No       ALLERGIES    Allergies   Allergen Reactions    Fish-Derived Products     Lemon Extract [Flavoring Agent]     Milk-Related Compounds     Trazodone And Nefazodone      Causes nightmares         MEDICATIONS    Current Outpatient Medications on File Prior to Encounter   Medication Sig Dispense Refill    hydrOXYzine (VISTARIL) 25 MG capsule Take 1 capsule by mouth 2 times daily 60 capsule 3    white petrolatum (VASELINE) GEL Apply 28 g topically as needed for Dry Skin 212 g 3    ibuprofen (ADVIL;MOTRIN) 800 MG tablet Take 1 tablet by mouth every 6 hours as needed for Pain 120 tablet 1    Diapers & Supplies MISC XL diapers. Patient prefers ones that has sticky tabs on sides since she is a paraplegic. Change diaper every 4 hours. 120 each 12    citalopram (CELEXA) 10 MG tablet Take 1 tablet by mouth daily 30 tablet 5     No current facility-administered medications on file prior to encounter. REVIEW OF SYSTEMS    Pertinent items are noted in HPI. Constitutional: Negative for systemic symptoms including fever, chills and malaise. Objective:      /74   Pulse 64   Temp 98.3 °F (36.8 °C) (Temporal)   Resp 16     PHYSICAL EXAM       General: The patient is in no acute distress. Mental status:  Patient is appropriate, is  oriented to place and plan of care. Dermatologic exam: Visual inspection of the periwound reveals the skin to be normal in turgor and texture  Wound exam: see wound description below in procedure note      Assessment:     Problem List Items Addressed This Visit     WD-Paraplegia ([address]    WD-Stage III pressure ulcer of right buttock ([facility] [address] - Primary    Relevant Orders    Initiate Outpatient Wound Care Protocol        Procedure Note    Indications:  Based on my examination of this patient's wound(s) today, sharp excision into necrotic subcutaneous tissue is required to promote healing and evaluate the extent of previous healing. Performed by: Indiana Faust MD    Consent obtained: Yes    Time out taken:  Yes    Pain Control: none needed       Debridement:Excisional Debridement    Using curette the wound(s) was/were sharply debrided down through and including the removal of epidermis, dermis and subcutaneous tissue.         Devitalized Tissue Debrided:  fibrin, biofilm and slough    Pre Debridement Measurements:  Are located in the Wound Documentation Flow Sheet    All active wounds listed below with today's date are evaluated  Wound(s)    debrided this date include # : 1     Post  Debridement Measurements:  Wound 02/11/19 WOUND #1 RIGHT POSTERIOR THIGH pressure 1 (Active)   Number of days: 1152       Wound 05/21/21 Buttocks Right Right Ischium  (Active)   Number of days: 322       Wound 02/11/22 #1 Right Buttock (Active)   Wound Image   03/25/22 1315   Wound Etiology Pressure Stage  2 04/08/22 1313   Dressing Status Clean;Dry; Intact; New dressing applied 03/25/22 1332   Wound Cleansed Irrigated with saline; Wound cleanser 04/08/22 1313   Dressing/Treatment Alginate;Silicone border 92/67/82 1332   Offloading for Diabetic Foot Ulcers Other (comment) 04/08/22 1313   Wound Length (cm) 1.5 cm 04/08/22 1313   Wound Width (cm) 1.4 cm 04/08/22 1313   Wound Depth (cm) 0.1 cm 04/08/22 1313   Wound Surface Area (cm^2) 2.1 cm^2 04/08/22 1313   Change in Wound Size % (l*w) 80 04/08/22 1313   Wound Volume (cm^3) 0.21 cm^3 04/08/22 1313   Wound Healing % 80 04/08/22 1313   Post-Procedure Length (cm) 1.5 cm 04/08/22 1320   Post-Procedure Width (cm) 1.4 cm 04/08/22 1320   Post-Procedure Depth (cm) 0.1 cm 04/08/22 1320   Post-Procedure Surface Area (cm^2) 2.1 cm^2 04/08/22 1320   Post-Procedure Volume (cm^3) 0.21 cm^3 04/08/22 1320   Distance Tunneling (cm) 0 cm 04/08/22 1313   Tunneling Position ___ O'Clock 0 04/08/22 1313   Undermining Starts ___ O'Clock 0 04/08/22 1313   Undermining Ends___ O'Clock 0 04/08/22 1313   Undermining Maxium Distance (cm) 0 04/08/22 1313   Wound Assessment Pink/red 04/08/22 1313   Drainage Amount Moderate 04/08/22 1313   Drainage Description Serosanguinous 04/08/22 1313   Odor None 04/08/22 1313   Dyan-wound Assessment Fragile 04/08/22 1313   Margins Defined edges; Unattached edges 04/08/22 1313   Wound Thickness Description not for Pressure Injury Full thickness 04/08/22 1313   Number of days: 55       Percent of Wound(s) Debrided: approximately 100%    Total  Area  Debrided:  2.1 sq cm     Bleeding: Minimal    Hemostasis Achieved:  by pressure    Procedural Pain:  0  / 10     Post Procedural Pain:  0 / 10     Response to treatment:  Well tolerated by patient. Status of wound progress and description from last visit:   Wound is unchanged. Plan:       Discharge Instructions            Discharge Instructions        PHYSICIAN ORDERS AND DISCHARGE INSTRUCTIONS     NOTE: Upon discharge from the [address], you will receive a patient experience survey. We would be grateful if you would take the time to fill this survey out.     Wound care order history:                 KEVIN's   Right       Left               Date:              Cultures:  Buttock wound cultured 5/21/21                                Obtained on 2/11/2022                                Cultured on 3/11/2022              Grafts:                Antibiotics:           Continuing wound care orders and information:                 Residence:                Continue home health care with: [address], supplies  Aide only not nursing  Fax #[phone]                [facility] [address] [place] [address] guardian  [id number]              Your wound-care supplies will be provided by:      Wound cleansing:               FT not scrub or use excessive force.              Wash hands with soap and water before and after dressing changes.               ZDGGE to applying a clean dressing, cleanse wound with normal saline, wound cleanser, or mild soap and water.               Ask the physician or nurse before getting the wound(s) wet in a shower     Daily Wound management:              Keep weight off wounds and reposition every 2 hours.              Avoid standing for long periods of time.              Apply wraps/stockings in AM and remove at bedtime.              If swelling is present, elevate legs to the level of the heart or above for 30 minutes 4-5 times a day and/or when sitting.                                      When taking antibiotics take entire prescription as ordered by physician do not stop taking until medicine is all gone.                                                           Orders for this week: 4/8/2022     Buttock wound cultured 5/21/21,2/11/2022     Rx: [facility] [address] [place] [address]  Right Buttock - wash with soap and water, pat dry. Apply  Hydrofera blue ready  border. Change daily and as needed.     Pressure reduction will facilitate healing, turn and reposition at least every 2 hours. Sent for pressure reduction mattress/pad from [address] [place] [phone].        Follow up with Dr Elysia Kern 2 weeks in the wound care center. Call [phone] for any questions or concerns.   Date__________   Time___________             Treatment Note      Written Patient Dismissal Instructions Given            Electronically signed by Abby Moore MD on 4/8/2022 at 1:29 PM

## 2022-04-19 ENCOUNTER — APPOINTMENT (OUTPATIENT)
Dept: CT IMAGING | Age: 32
End: 2022-04-19
Payer: COMMERCIAL

## 2022-04-19 ENCOUNTER — HOSPITAL ENCOUNTER (OUTPATIENT)
Age: 32
Setting detail: OBSERVATION
Discharge: HOME | End: 2022-04-20
Attending: STUDENT IN AN ORGANIZED HEALTH CARE EDUCATION/TRAINING PROGRAM | Admitting: INTERNAL MEDICINE
Payer: COMMERCIAL

## 2022-04-19 DIAGNOSIS — R11.2 NAUSEA AND VOMITING, INTRACTABILITY OF VOMITING NOT SPECIFIED, UNSPECIFIED VOMITING TYPE: Primary | ICD-10-CM

## 2022-04-19 PROBLEM — K52.9 ACUTE GASTROENTERITIS: Status: ACTIVE | Noted: 2022-04-19

## 2022-04-19 LAB
ACETAMINOPHEN LEVEL: <5 UG/ML (ref 15–30)
ALBUMIN SERPL-MCNC: 3.6 GM/DL (ref 3.4–5)
ALCOHOL SCREEN SERUM: <0.01 %WT/VOL
ALP BLD-CCNC: 93 IU/L (ref 40–129)
ALT SERPL-CCNC: 14 U/L (ref 10–40)
ANION GAP SERPL CALCULATED.3IONS-SCNC: 12 MMOL/L (ref 4–16)
AST SERPL-CCNC: 17 IU/L (ref 15–37)
BASOPHILS ABSOLUTE: 0.1 K/CU MM
BASOPHILS RELATIVE PERCENT: 0.5 % (ref 0–1)
BILIRUB SERPL-MCNC: 0.1 MG/DL (ref 0–1)
BUN BLDV-MCNC: 9 MG/DL (ref 6–23)
CALCIUM SERPL-MCNC: 8.9 MG/DL (ref 8.3–10.6)
CHLORIDE BLD-SCNC: 105 MMOL/L (ref 99–110)
CO2: 26 MMOL/L (ref 21–32)
CREAT SERPL-MCNC: 0.4 MG/DL (ref 0.6–1.1)
DIFFERENTIAL TYPE: ABNORMAL
DOSE AMOUNT: ABNORMAL
DOSE AMOUNT: ABNORMAL
DOSE TIME: ABNORMAL
DOSE TIME: ABNORMAL
EOSINOPHILS ABSOLUTE: 0.4 K/CU MM
EOSINOPHILS RELATIVE PERCENT: 3.5 % (ref 0–3)
ERYTHROCYTE SEDIMENTATION RATE: 34 MM/HR (ref 0–20)
GFR AFRICAN AMERICAN: >60 ML/MIN/1.73M2
GFR NON-AFRICAN AMERICAN: >60 ML/MIN/1.73M2
GLUCOSE BLD-MCNC: 97 MG/DL (ref 70–99)
HCG QUALITATIVE: NEGATIVE
HCT VFR BLD CALC: 37.5 % (ref 37–47)
HEMOGLOBIN: 11.4 GM/DL (ref 12.5–16)
HIGH SENSITIVE C-REACTIVE PROTEIN: 31.4 MG/L
IMMATURE NEUTROPHIL %: 0.3 % (ref 0–0.43)
LIPASE: 21 IU/L (ref 13–60)
LYMPHOCYTES ABSOLUTE: 2.9 K/CU MM
LYMPHOCYTES RELATIVE PERCENT: 27.6 % (ref 24–44)
MCH RBC QN AUTO: 25.3 PG (ref 27–31)
MCHC RBC AUTO-ENTMCNC: 30.4 % (ref 32–36)
MCV RBC AUTO: 83.3 FL (ref 78–100)
MONOCYTES ABSOLUTE: 0.6 K/CU MM
MONOCYTES RELATIVE PERCENT: 5.7 % (ref 0–4)
NUCLEATED RBC %: 0 %
PDW BLD-RTO: 14.3 % (ref 11.7–14.9)
PLATELET # BLD: 334 K/CU MM (ref 140–440)
PMV BLD AUTO: 8.8 FL (ref 7.5–11.1)
POTASSIUM SERPL-SCNC: 3.9 MMOL/L (ref 3.5–5.1)
RBC # BLD: 4.5 M/CU MM (ref 4.2–5.4)
SALICYLATE LEVEL: <0.3 MG/DL (ref 15–30)
SEGMENTED NEUTROPHILS ABSOLUTE COUNT: 6.5 K/CU MM
SEGMENTED NEUTROPHILS RELATIVE PERCENT: 62.4 % (ref 36–66)
SODIUM BLD-SCNC: 143 MMOL/L (ref 135–145)
TOTAL IMMATURE NEUTOROPHIL: 0.03 K/CU MM
TOTAL NUCLEATED RBC: 0 K/CU MM
TOTAL PROTEIN: 7.3 GM/DL (ref 6.4–8.2)
WBC # BLD: 10.4 K/CU MM (ref 4–10.5)

## 2022-04-19 PROCEDURE — 80053 COMPREHEN METABOLIC PANEL: CPT

## 2022-04-19 PROCEDURE — 74177 CT ABD & PELVIS W/CONTRAST: CPT

## 2022-04-19 PROCEDURE — 86141 C-REACTIVE PROTEIN HS: CPT

## 2022-04-19 PROCEDURE — 84703 CHORIONIC GONADOTROPIN ASSAY: CPT

## 2022-04-19 PROCEDURE — 85025 COMPLETE CBC W/AUTO DIFF WBC: CPT

## 2022-04-19 PROCEDURE — G0480 DRUG TEST DEF 1-7 CLASSES: HCPCS

## 2022-04-19 PROCEDURE — 99285 EMERGENCY DEPT VISIT HI MDM: CPT

## 2022-04-19 PROCEDURE — 83690 ASSAY OF LIPASE: CPT

## 2022-04-19 PROCEDURE — 85652 RBC SED RATE AUTOMATED: CPT

## 2022-04-19 PROCEDURE — G0378 HOSPITAL OBSERVATION PER HR: HCPCS

## 2022-04-19 PROCEDURE — 6360000004 HC RX CONTRAST MEDICATION: Performed by: STUDENT IN AN ORGANIZED HEALTH CARE EDUCATION/TRAINING PROGRAM

## 2022-04-19 RX ORDER — ONDANSETRON 2 MG/ML
4 INJECTION INTRAMUSCULAR; INTRAVENOUS EVERY 6 HOURS PRN
Status: DISCONTINUED | OUTPATIENT
Start: 2022-04-19 | End: 2022-04-20 | Stop reason: HOSPADM

## 2022-04-19 RX ADMIN — IOPAMIDOL 75 ML: 755 INJECTION, SOLUTION INTRAVENOUS at 19:41

## 2022-04-19 NOTE — ED PROVIDER NOTES
Emergency Department Encounter    Patient: Billie Zurita  MRN: [id number]  : 1990  Date of Evaluation: 2022  ED Provider:  Adis Chamberlain MD    Triage Chief Complaint:   Abdominal Pain (lower mid abdominal pain, since last thursday)    [place]:  Billie Zurita is a 33 y.o. female with history seen below presenting with complaints of diarrhea over the past few days, 1 episode nonbilious nonbloody vomiting as well as mild diffuse discomfort in her abdomen. Patient states she would not have come in if it was not for her staff urging her to come in to get evaluated. Patient states she had 1 episode of nonbilious nonbloody vomiting today as well as has about 2-3 episodes of diarrhea that are on bloody nonmelanotic over the past several days. Denies any urinary symptoms including dysuria, increased frequency, urgency, hematuria. Denies fevers or chills. Denies any recent falls or trauma. Denies back pain. Denies chest pain or shortness of breath, cough or sputum production. Denies headache, blurred vision, focal neurodeficits or motor or sensory changes throughout baseline for her. Patient also describes suicidal ideation. Denies current plan, hallucinations, paranoia. Denies any attempts to harm herself recently    ROS - see HPI, below listed is current ROS at time of my eval:  At least 14 systems reviewed, negative other HPI    Past Medical History:   Diagnosis Date    Anxiety     Back pain     Depression     Encephalitis     Hypertension     Meningitis     Paraplegia ([facility] [address]     Suicidal ideation     Urinary incontinence      Past Surgical History:   Procedure Laterality Date    PRE-MALIGNANT / BENIGN SKIN LESION EXCISION       left shoulder     History reviewed. No pertinent family history.   Social History     Socioeconomic History    Marital status: Single     Spouse name: Not on file    Number of children: Not on file    Years of education: Not on file    Highest education level: Not on file   Occupational History    Not on file   Tobacco Use    Smoking status: Never Smoker    Smokeless tobacco: Never Used   Vaping Use    Vaping Use: Never used   Substance and Sexual Activity    Alcohol use: No    Drug use: No    Sexual activity: Never   Other Topics Concern    Not on file   Social History Narrative    Lives in group home. Wheelchair-bound since meningitis at 13 yo     Social Determinants of Health     Financial Resource Strain:     Difficulty of Paying Living Expenses: Not on file   Food Insecurity:     Worried About Running Out of Food in the Last Year: Not on file    [place] of Food in the Last Year: Not on file   Transportation Needs:     Lack of Transportation (Medical): Not on file    Lack of Transportation (Non-Medical): Not on file   Physical Activity:     Days of Exercise per Week: Not on file    Minutes of Exercise per Session: Not on file   Stress:     Feeling of Stress : Not on file   Social Connections:     Frequency of Communication with Friends and Family: Not on file    Frequency of Social Gatherings with Friends and Family: Not on file    Attends [employer] Services: Not on file    Active Member of [address] [place] [address] or Organizations: Not on file    Attends Club or Organization Meetings: Not on file    Marital Status: Not on file   Intimate Partner Violence:     Fear of Current or Ex-Partner: Not on file    Emotionally Abused: Not on file    Physically Abused: Not on file    Sexually Abused: Not on file   Housing Stability:     Unable to Pay for Housing in the Last Year: Not on file    Number of [place] in the Last Year: Not on file    Unstable Housing in the Last Year: Not on file     No current facility-administered medications for this encounter.      Current Outpatient Medications   Medication Sig Dispense Refill    hydrOXYzine (VISTARIL) 25 MG capsule Take 1 capsule by mouth 2 times daily 60 capsule 3    white petrolatum (VASELINE) GEL Apply 28 g topically as needed for Dry Skin 212 g 3    ibuprofen (ADVIL;MOTRIN) 800 MG tablet Take 1 tablet by mouth every 6 hours as needed for Pain 120 tablet 1    Diapers & Supplies MISC XL diapers. Patient prefers ones that has sticky tabs on sides since she is a paraplegic. Change diaper every 4 hours. 120 each 12    citalopram (CELEXA) 10 MG tablet Take 1 tablet by mouth daily 30 tablet 5     Allergies   Allergen Reactions    Fish-Derived Products     Lemon Extract [Flavoring Agent]     Milk-Related Compounds     Trazodone And Nefazodone      Causes nightmares         Nursing Notes Reviewed    Physical Exam:  Triage VS:    ED Triage Vitals [04/19/22 3498]   Enc Vitals Group      /80      Pulse 87      Resp 18      Temp 98.2 °F (36.8 °C)      Temp src       SpO2 98 %      Weight 250 lb (113.4 kg)      Height 5' 3\" (1.6 m)      Head Circumference       Peak Flow       Pain Score       Pain Loc       Pain Edu? Excl. in [address]? My pulse ox interpretation is - normal    General appearance:  No acute distress. Skin:  Warm. Dry. Eye:  Extraocular movements intact. Ears, nose, mouth and throat:  Oral mucosa moist   Neck:  Trachea midline. Extremity:  No swelling. Normal ROM     Heart:  Regular rate and rhythm, normal S1 & S2, no extra heart sounds. Perfusion:  intact  Respiratory:  Lungs clear to auscultation bilaterally. Respirations nonlabored. Abdominal:  Normal bowel sounds. Soft. Nontender even with deep palpation, no CVA tenderness. Non distended. Back:  No CVA tenderness to palpation     Neurological:  Alert and oriented times 3. No focal neuro deficits. Psychiatric:  Appropriate    I have reviewed and interpreted all of the currently available lab results from this visit (if applicable):  No results found for this visit on 04/19/22. Radiographs (if obtained):  Radiologist's Report Reviewed:  No results found.     MDM:    33year-old female presenting with nausea vomiting diarrhea as well as abdominal cramping. History obesity above. Vitals on presentation are reassuring and patient afebrile satting on room air. On physical exam lungs are clear to auscultation, cardiac exam is reassuring. Patient has mild discomfort in the abdomen without any rebound or guarding. There is no flank pain, no CVA tenderness, no central spinal tenderness. Patient denies fevers or chills. Patient states she would not of come in to the hospital if her staff did not urge her to come in to be evaluated. CBC is reassuring without leukocytosis. Hemoglobin is stable for patient. CMP is reassuring. Pregnancy is negative. Lipase is not elevated. CT abdomen pelvis reveals a nonobstructive gaseous distention of the bowel and there is elongated sigmoid flexure that may predispose the patient to a sigmoid volvulus possibly an intermittent volvulus there is mild mural thickening at the rectosigmoid junction suggesting edema or mild nonspecific colitis. Discussed with GI with concern for possible intermittent sigmoid volvulus we will admit patient for serial abdominal exams and monitoring overnight. Mental health consulted, psych labs are ordered and pending. Discussed with hospital service and patient will be admitted to their service for further evaluation and treatment. Clinical Impression:  1. Nausea and vomiting, intractability of vomiting not specified, unspecified vomiting type          Comment: Please note this report has been produced using speech recognition software and may contain errors related to that system including errors in grammar, punctuation, and spelling, as well as words and phrases that may be inappropriate. Efforts were made to edit the dictations.         Frank Kirkpatrick MD  04/22/22 9092

## 2022-04-20 ENCOUNTER — APPOINTMENT (OUTPATIENT)
Dept: GENERAL RADIOLOGY | Age: 32
End: 2022-04-20
Payer: COMMERCIAL

## 2022-04-20 VITALS
RESPIRATION RATE: 14 BRPM | OXYGEN SATURATION: 97 % | DIASTOLIC BLOOD PRESSURE: 70 MMHG | HEART RATE: 79 BPM | WEIGHT: 164.46 LBS | TEMPERATURE: 98.1 F | BODY MASS INDEX: 29.14 KG/M2 | SYSTOLIC BLOOD PRESSURE: 107 MMHG | HEIGHT: 63 IN

## 2022-04-20 PROCEDURE — 94761 N-INVAS EAR/PLS OXIMETRY MLT: CPT

## 2022-04-20 PROCEDURE — G0378 HOSPITAL OBSERVATION PER HR: HCPCS

## 2022-04-20 PROCEDURE — 2580000003 HC RX 258: Performed by: INTERNAL MEDICINE

## 2022-04-20 PROCEDURE — 99211 OFF/OP EST MAY X REQ PHY/QHP: CPT

## 2022-04-20 PROCEDURE — 6370000000 HC RX 637 (ALT 250 FOR IP): Performed by: INTERNAL MEDICINE

## 2022-04-20 PROCEDURE — 99214 OFFICE O/P EST MOD 30 MIN: CPT | Performed by: PHYSICIAN ASSISTANT

## 2022-04-20 PROCEDURE — 6360000002 HC RX W HCPCS: Performed by: INTERNAL MEDICINE

## 2022-04-20 PROCEDURE — 74018 RADEX ABDOMEN 1 VIEW: CPT

## 2022-04-20 PROCEDURE — 99243 OFF/OP CNSLTJ NEW/EST LOW 30: CPT | Performed by: NURSE PRACTITIONER

## 2022-04-20 RX ORDER — SODIUM CHLORIDE 0.9 % (FLUSH) 0.9 %
10 SYRINGE (ML) INJECTION PRN
Status: DISCONTINUED | OUTPATIENT
Start: 2022-04-20 | End: 2022-04-20 | Stop reason: HOSPADM

## 2022-04-20 RX ORDER — SODIUM CHLORIDE 9 MG/ML
INJECTION, SOLUTION INTRAVENOUS PRN
Status: DISCONTINUED | OUTPATIENT
Start: 2022-04-20 | End: 2022-04-20 | Stop reason: HOSPADM

## 2022-04-20 RX ORDER — MORPHINE SULFATE 2 MG/ML
2 INJECTION, SOLUTION INTRAMUSCULAR; INTRAVENOUS EVERY 4 HOURS PRN
Status: DISCONTINUED | OUTPATIENT
Start: 2022-04-20 | End: 2022-04-20 | Stop reason: HOSPADM

## 2022-04-20 RX ORDER — OXYCODONE HYDROCHLORIDE 5 MG/1
5 TABLET ORAL EVERY 4 HOURS PRN
Status: DISCONTINUED | OUTPATIENT
Start: 2022-04-20 | End: 2022-04-20 | Stop reason: HOSPADM

## 2022-04-20 RX ORDER — POTASSIUM CHLORIDE 7.45 MG/ML
10 INJECTION INTRAVENOUS PRN
Status: DISCONTINUED | OUTPATIENT
Start: 2022-04-20 | End: 2022-04-20 | Stop reason: HOSPADM

## 2022-04-20 RX ORDER — ONDANSETRON 4 MG/1
4 TABLET, ORALLY DISINTEGRATING ORAL EVERY 8 HOURS PRN
Status: DISCONTINUED | OUTPATIENT
Start: 2022-04-20 | End: 2022-04-20 | Stop reason: HOSPADM

## 2022-04-20 RX ORDER — ACETAMINOPHEN 650 MG/1
650 SUPPOSITORY RECTAL EVERY 6 HOURS PRN
Status: DISCONTINUED | OUTPATIENT
Start: 2022-04-20 | End: 2022-04-20 | Stop reason: HOSPADM

## 2022-04-20 RX ORDER — HYDROXYZINE PAMOATE 25 MG/1
25 CAPSULE ORAL 2 TIMES DAILY
Status: DISCONTINUED | OUTPATIENT
Start: 2022-04-20 | End: 2022-04-20 | Stop reason: HOSPADM

## 2022-04-20 RX ORDER — POTASSIUM CHLORIDE 20 MEQ/1
40 TABLET, EXTENDED RELEASE ORAL PRN
Status: DISCONTINUED | OUTPATIENT
Start: 2022-04-20 | End: 2022-04-20 | Stop reason: HOSPADM

## 2022-04-20 RX ORDER — CITALOPRAM 20 MG/1
10 TABLET ORAL DAILY
Status: DISCONTINUED | OUTPATIENT
Start: 2022-04-20 | End: 2022-04-20

## 2022-04-20 RX ORDER — SODIUM CHLORIDE, SODIUM LACTATE, POTASSIUM CHLORIDE, CALCIUM CHLORIDE 600; 310; 30; 20 MG/100ML; MG/100ML; MG/100ML; MG/100ML
INJECTION, SOLUTION INTRAVENOUS CONTINUOUS
Status: DISCONTINUED | OUTPATIENT
Start: 2022-04-20 | End: 2022-04-20

## 2022-04-20 RX ORDER — MAGNESIUM SULFATE IN WATER 40 MG/ML
2000 INJECTION, SOLUTION INTRAVENOUS PRN
Status: DISCONTINUED | OUTPATIENT
Start: 2022-04-20 | End: 2022-04-20 | Stop reason: HOSPADM

## 2022-04-20 RX ORDER — CITALOPRAM 20 MG/1
20 TABLET ORAL DAILY
Qty: 30 TABLET | Refills: 0 | Status: SHIPPED | OUTPATIENT
Start: 2022-04-21

## 2022-04-20 RX ORDER — ACETAMINOPHEN 325 MG/1
650 TABLET ORAL EVERY 6 HOURS PRN
Status: DISCONTINUED | OUTPATIENT
Start: 2022-04-20 | End: 2022-04-20 | Stop reason: HOSPADM

## 2022-04-20 RX ORDER — SODIUM CHLORIDE 0.9 % (FLUSH) 0.9 %
5-40 SYRINGE (ML) INJECTION EVERY 12 HOURS SCHEDULED
Status: DISCONTINUED | OUTPATIENT
Start: 2022-04-20 | End: 2022-04-20 | Stop reason: HOSPADM

## 2022-04-20 RX ORDER — CITALOPRAM 20 MG/1
20 TABLET ORAL DAILY
Status: DISCONTINUED | OUTPATIENT
Start: 2022-04-21 | End: 2022-04-20 | Stop reason: HOSPADM

## 2022-04-20 RX ORDER — ENOXAPARIN SODIUM 100 MG/ML
40 INJECTION SUBCUTANEOUS DAILY
Status: DISCONTINUED | OUTPATIENT
Start: 2022-04-20 | End: 2022-04-20 | Stop reason: HOSPADM

## 2022-04-20 RX ORDER — ONDANSETRON 2 MG/ML
4 INJECTION INTRAMUSCULAR; INTRAVENOUS EVERY 6 HOURS PRN
Status: DISCONTINUED | OUTPATIENT
Start: 2022-04-20 | End: 2022-04-20 | Stop reason: HOSPADM

## 2022-04-20 RX ORDER — PANTOPRAZOLE SODIUM 40 MG/1
40 TABLET, DELAYED RELEASE ORAL
Status: DISCONTINUED | OUTPATIENT
Start: 2022-04-21 | End: 2022-04-20 | Stop reason: HOSPADM

## 2022-04-20 RX ORDER — POLYETHYLENE GLYCOL 3350 17 G/17G
17 POWDER, FOR SOLUTION ORAL DAILY PRN
Status: DISCONTINUED | OUTPATIENT
Start: 2022-04-20 | End: 2022-04-20 | Stop reason: HOSPADM

## 2022-04-20 RX ADMIN — SODIUM CHLORIDE, PRESERVATIVE FREE 10 ML: 5 INJECTION INTRAVENOUS at 09:48

## 2022-04-20 RX ADMIN — HYDROXYZINE PAMOATE 25 MG: 25 CAPSULE ORAL at 09:47

## 2022-04-20 RX ADMIN — HYDROXYZINE PAMOATE 25 MG: 25 CAPSULE ORAL at 17:16

## 2022-04-20 RX ADMIN — CITALOPRAM 10 MG: 20 TABLET, FILM COATED ORAL at 09:47

## 2022-04-20 ASSESSMENT — ENCOUNTER SYMPTOMS
VOMITING: 0
BACK PAIN: 0
EYE ITCHING: 0
ANAL BLEEDING: 0
APNEA: 0
COLOR CHANGE: 0
RECTAL PAIN: 0
CHOKING: 0
BLOOD IN STOOL: 0
CONSTIPATION: 0
ABDOMINAL PAIN: 0
EYE REDNESS: 0
DIARRHEA: 0
SORE THROAT: 0
STRIDOR: 0
NAUSEA: 0
PHOTOPHOBIA: 0

## 2022-04-20 ASSESSMENT — PAIN SCALES - GENERAL
PAINLEVEL_OUTOF10: 0
PAINLEVEL_OUTOF10: 0

## 2022-04-20 NOTE — H&P
Hospital Medicine History and Physical    4/19/2022    Date of Admission: 4/19/2022    Date of Service: Pt seen/examined on 4/19/2022 and admitted to observation. Assessment/plan:  1. Acute gastroenteritis. Cannot exclude colitis. Patient with abdominal pain, nausea, vomiting, diarrhea. Check sed rate, CRP, stool studies for C. difficile, GI bacterial pathogen. GI has been consulted from the emergency room. General surgery has also been consulted for possible intermittent volvulus per radiology read. In the meantime, will place on intravenous fluid, as needed antiemetics, as needed pain regimen. Continue clear diet for now. 2. Possible intermittent volvulus, as noted on CT-abdomen/pelvis. GI and General surgery have been consulted as noted above. N.p.o. after midnight. Repeat KUB in the morning. 3. Suicidal ideation. Continue sitter to bedside, maintain on suicide precautions. Psychiatry consult. 1. Addendum at 11:32 PM: Following admission, I was later contacted from the emergency room mental health has evaluated patient and recommended no suicide precautions and the patient is not suicidal, does not need sitter to bedside, also recommended no need for psychiatric consult. I have discontinued suicide precautions and psychiatric consult. 4. Other comorbidities: history of anxiety and depression, essential hypertension, chronic pressure ulcer of right buttock, microcytic anemia, morbid obesity with BMI of 44 kg/m², paraplegia. Activities: Up with assist  Prophylaxis: SC lovenox  Code status: Full code    ==========================================================  Chief complaint:  Chief Complaint   Patient presents with    Abdominal Pain     lower mid abdominal pain, since last thursday       History of Presenting Illness:   This is a pleasant 33 y.o. female with history of anxiety and depression, essential hypertension, chronic pressure ulcer of right buttock, morbid obesity with BMI of 44 kg/m², who is paraplegic, who presents to the emergency room with complaints of abdominal discomfort, nausea, vomiting, diarrhea, ongoing for the past 3 days. She does not have fever or chills. While in the emergency room, she also expressed feeling suicidal.    CT-abdomen/pelvis with IV contrast reveals nonobstructive gaseous distention of the bowel, large sigmoid flexure that may predispose patient to sigmoid volvulus possibly an intermittent volvulus; mild pleural thickening at the rectosigmoid junction suggestive of edema or mild nonspecific colitis. Past Medical History:      Diagnosis Date    Anxiety     Back pain     Depression     Encephalitis     Hypertension     Meningitis     Paraplegia (HCC)     Suicidal ideation     Urinary incontinence        Past Surgical History:      Procedure Laterality Date    PRE-MALIGNANT / BENIGN SKIN LESION EXCISION      2015 left shoulder       Medications (prior to admission):  Prior to Admission medications    Medication Sig Start Date End Date Taking? Authorizing Provider   hydrOXYzine (VISTARIL) 25 MG capsule Take 1 capsule by mouth 2 times daily 2/14/22   PRECIOUS Randall CNP   white petrolatum (VASELINE) GEL Apply 28 g topically as needed for Dry Skin 2/14/22   PRECIOUS Randall CNP   ibuprofen (ADVIL;MOTRIN) 800 MG tablet Take 1 tablet by mouth every 6 hours as needed for Pain 1/27/22   PRECIOUS Randall CNP   Diapers & Supplies MISC XL diapers. Patient prefers ones that has sticky tabs on sides since she is a paraplegic. Change diaper every 4 hours. 1/19/22   PRECIOUS Randall CNP   citalopram (CELEXA) 10 MG tablet Take 1 tablet by mouth daily 1/12/22   PRECIOUS Randall CNP       Allergy(ies):  Fish-derived products, Lemon extract [flavoring agent], Milk-related compounds, and Trazodone and nefazodone    Social History:  TOBACCO:  reports that she has never smoked.  She has never used smokeless tobacco.  ETOH:  reports no history of alcohol use. Family History:      Family history unknown: Yes       Review of Systems:  Pertinent positives are listed in HPI. At least 10-point ROS reviewed and were negative. Vitals and physical examination:  /80   Pulse 87   Temp 98.2 °F (36.8 °C)   Resp 18   Ht 5' 3\" (1.6 m)   Wt 250 lb (113.4 kg)   SpO2 98%   BMI 44.29 kg/m²   Gen/overall appearance: Not in acute distress. Alert. Oriented x3. Head: Normocephalic, atraumatic  Eyes: EOMI, good acuity  ENT: Oral mucosa moist  Neck: No JVD, thyromegaly  CVS: Nml S1S2, no MRG, RRR  Pulm: Clear bilaterally. No crackles/wheezes  Gastrointestinal: Abdomen is distended, mildly tender to palpation. Soft, +BS  Musculoskeletal: No edema. Warm  Neuro: Patient is paraplegic; unable to move lower extremities. Moves upper extremities spontaneously. Psychiatry: Appropriate affect. Not agitated. Skin: Warm, dry with normal turgor. No rash  Capillary refill: Brisk,< 3 seconds   Peripheral Pulses: +2 palpable, equal bilaterally       Labs/imaging/EKG:  CBC:   Recent Labs     04/19/22  1828   WBC 10.4   HGB 11.4*        BMP:    Recent Labs     04/19/22  1828      K 3.9      CO2 26   BUN 9   CREATININE 0.4*   GLUCOSE 97     Hepatic:   Recent Labs     04/19/22  1828   AST 17   ALT 14   BILITOT 0.1   ALKPHOS 80       CT ABDOMEN PELVIS W IV CONTRAST Additional Contrast? None    Result Date: 4/19/2022  EXAMINATION: CT OF THE ABDOMEN AND PELVIS WITH CONTRAST 4/19/2022 7:40 pm TECHNIQUE: CT of the abdomen and pelvis was performed with the administration of intravenous contrast. Multiplanar reformatted images are provided for review. Dose modulation, iterative reconstruction, and/or weight based adjustment of the mA/kV was utilized to reduce the radiation dose to as low as reasonably achievable.  COMPARISON: 08/07/2018 HISTORY: ORDERING SYSTEM PROVIDED HISTORY: diffuse abdominal discomfort, diarrhea, 1 episode vomiting TECHNOLOGIST PROVIDED HISTORY: Reason for exam:->diffuse abdominal discomfort, diarrhea, 1 episode vomiting Additional Contrast?->None Decision Support Exception - unselect if not a suspected or confirmed emergency medical condition->Emergency Medical Condition (MA) Reason for Exam: diffuse abdominal discomfort, diarrhea, 1 episode vomiting FINDINGS: Lower Chest: Lung bases are grossly clear and the heart size is normal. Organs: The gallbladder is moderately dilated. No calcified gallstones. No pericholecystic fluid or fat stranding. The liver, spleen, pancreas, adrenal glands and kidneys are normal. GI/Bowel: There is nonobstructive gaseous distension of the colon and to a lesser extent small bowel. The colon is redundant, particularly the sigmoid flexure. There is mild mural thickening at the rectosigmoid junction. The appendix is normal. Pelvis: Urinary bladder and uterus are unremarkable. Peritoneum/Retroperitoneum: There is no adenopathy, free air or free fluid. Bones/Soft Tissues: Stable sacral deformity. No acute bone finding. Nonobstructive gaseous distention of bowel. There is an elongated sigmoid flexure that may predispose the patient to a sigmoid volvulus, possibly an intermittent volvulus. There is mild mural thickening at the rectosigmoid junction suggesting edema or a mild nonspecific colitis. Clinical correlation is recommended. Discussed with ER physician.       Thank you PRECIOUS Thompson CNP for the opportunity to be involved in this patient's care.    -----------------------------  Ronaldo Jean MD  [place] Hospitalist

## 2022-04-20 NOTE — CONSULTS
[address] [address] Continence Nurse  Consult Note       Manuel Leigh  AGE: 33 y.o. GENDER: female  : 1990  TODAY'S DATE:  2022    Subjective:     Reason for CWOCN Evaluation and Assessment: wound assessment      Manuel Leigh is a 33 y.o. female referred by:   [x] Physician  [] Nursing  [] Other:     Wound Identification:  Wound Type: pressure  Contributing Factors: chronic pressure, decreased mobility, incontinence of stool and incontinence of urine        PAST MEDICAL HISTORY        Diagnosis Date    Anxiety     Back pain     Depression     Encephalitis     Hypertension     Meningitis     Paraplegia ([facility] [address]     Suicidal ideation     Urinary incontinence        PAST SURGICAL HISTORY    Past Surgical History:   Procedure Laterality Date    PRE-MALIGNANT / BENIGN SKIN LESION EXCISION      2015 left shoulder       FAMILY HISTORY    Family History   Family history unknown: Yes       SOCIAL HISTORY    Social History     Tobacco Use    Smoking status: Never Smoker    Smokeless tobacco: Never Used   Vaping Use    Vaping Use: Never used   Substance Use Topics    Alcohol use: No    Drug use: No       ALLERGIES    Allergies   Allergen Reactions    Fish-Derived Products     Lemon Extract [Flavoring Agent]     Milk-Related Compounds     Trazodone And Nefazodone      Causes nightmares         MEDICATIONS    No current facility-administered medications on file prior to encounter. Current Outpatient Medications on File Prior to Encounter   Medication Sig Dispense Refill    hydrOXYzine (VISTARIL) 25 MG capsule Take 1 capsule by mouth 2 times daily 60 capsule 3    white petrolatum (VASELINE) GEL Apply 28 g topically as needed for Dry Skin 212 g 3    ibuprofen (ADVIL;MOTRIN) 800 MG tablet Take 1 tablet by mouth every 6 hours as needed for Pain 120 tablet 1    Diapers & Supplies MISC XL diapers. Patient prefers ones that has sticky tabs on sides since she is a paraplegic. Change diaper every 4 hours.  120 each 12    citalopram (CELEXA) 10 MG tablet Take 1 tablet by mouth daily 30 tablet 5         Objective:      /70   Pulse 79   Temp 98.1 °F (36.7 °C) (Oral)   Resp 14   Ht 5' 3\" (1.6 m)   Wt 164 lb 7.4 oz (74.6 kg)   SpO2 97%   BMI 29.13 kg/m²   Gagandeep Risk Score: Gagandeep Scale Score: 11    LABS    CBC:   Lab Results   Component Value Date    WBC 10.4 04/19/2022    RBC 4.50 04/19/2022    HGB 11.4 04/19/2022    HCT 37.5 04/19/2022    MCV 83.3 04/19/2022    MCH 25.3 04/19/2022    MCHC 30.4 04/19/2022    RDW 14.3 04/19/2022     04/19/2022    MPV 8.8 04/19/2022     CMP:    Lab Results   Component Value Date     04/19/2022    K 3.9 04/19/2022     04/19/2022    CO2 26 04/19/2022    BUN 9 04/19/2022    CREATININE 0.4 04/19/2022    GFRAA >60 04/19/2022    AGRATIO 1.4 08/29/2019    LABGLOM >60 04/19/2022    GLUCOSE 97 04/19/2022    PROT 7.3 04/19/2022    PROT 7.5 10/27/2012    LABALBU 3.6 04/19/2022    CALCIUM 8.9 04/19/2022    BILITOT 0.1 04/19/2022    ALKPHOS 93 04/19/2022    AST 17 04/19/2022    ALT 14 04/19/2022     Albumin:    Lab Results   Component Value Date    LABALBU 3.6 04/19/2022     PT/INR:    Lab Results   Component Value Date    PROTIME 11.8 08/31/2016    INR 1.01 08/31/2016     HgBA1c:    Lab Results   Component Value Date    LABA1C 4.9 06/21/2016         Assessment:     Patient Active Problem List   Diagnosis    Nevus    Major depressive disorder    Hallucinations    [facility]-Paraplegia ([facility] [address]    Non-pressure chronic ulcer of buttock, limited to breakdown of skin ([facility] [address]    Decubitus ulcer of right perineal ischial region, stage I    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)    Altered mental status    Other insomnia    Anxiety    Acute gastroenteritis       Measurements:  Wound 02/11/19 WOUND #1 RIGHT POSTERIOR THIGH pressure 1 (Active)   Number of days: 1164       Wound 05/21/21 Buttocks Right Right Ischium  (Active)   Number of days: 334 Wound 02/11/22 #1 Right Buttock (Active)   Wound Image   04/20/22 1150   Wound Etiology Pressure Stage  3 04/20/22 1150   Dressing Status New dressing applied 04/20/22 1150   Wound Cleansed Cleansed with saline 04/20/22 1150   Dressing/Treatment Collagen;Silicone border 99/52/95 1150   Offloading for Diabetic Foot Ulcers Other (comment) 04/08/22 1313   Wound Length (cm) 1 cm 04/20/22 1150   Wound Width (cm) 0.8 cm 04/20/22 1150   Wound Depth (cm) 0.1 cm 04/20/22 1150   Wound Surface Area (cm^2) 0.8 cm^2 04/20/22 1150   Change in Wound Size % (l*w) 92.38 04/20/22 1150   Wound Volume (cm^3) 0.08 cm^3 04/20/22 1150   Wound Healing % 92 04/20/22 1150   Post-Procedure Length (cm) 1.5 cm 04/08/22 1320   Post-Procedure Width (cm) 1.4 cm 04/08/22 1320   Post-Procedure Depth (cm) 0.1 cm 04/08/22 1320   Post-Procedure Surface Area (cm^2) 2.1 cm^2 04/08/22 1320   Post-Procedure Volume (cm^3) 0.21 cm^3 04/08/22 1320   Distance Tunneling (cm) 0 cm 04/20/22 1150   Tunneling Position ___ O'Clock 0 04/20/22 1150   Undermining Starts ___ O'Clock 0 04/20/22 1150   Undermining Ends___ O'Clock 0 04/20/22 1150   Undermining Maxium Distance (cm) 0 04/20/22 1150   Wound Assessment Pink/red 04/20/22 1150   Drainage Amount Small 04/20/22 1150   Drainage Description Serosanguinous 04/20/22 1150   Odor None 04/20/22 1150   Dyan-wound Assessment Blanchable erythema 04/20/22 1150   Margins Defined edges 04/20/22 1150   Wound Thickness Description not for Pressure Injury Full thickness 04/20/22 1150   Number of days: 67       Response to treatment:  Well tolerated by patient. Pain Assessment:  Severity:  mild  Quality of pain: sharp  Wound Pain Timing/Severity: intermittent  Premedicated: no    Plan:     Plan of Care: Wound 02/11/22 #1 Right Buttock-Dressing/Treatment: Collagen,Silicone border     Pt in bed. Agreeable to wound assessment. Active with outpatient wound clinic. Notes reviewed. Paraplegic.  Has [place] but bed/linens with urine. Complete bed change done and ginger care. Right buttock/thigh with healing stage 3 per notes. Picture and measurements taken. Collagen and silicone foam border applied. Moisture barrier cream applied to perineum. Ordered Dolphin mattress from [employer]. Positioned to right side with heels floated with pillow support. Pt is a high risk for skin breakdown AEB Gagandeep. Follow Gagandeep orders. Updated nurse. Specialty Bed Required : yes  [] Low Air Loss   [] Pressure Redistribution  [x] Fluid Immersion  [] Bariatric  [] Total Pressure Relief  [] Other:     Discharge Plan:  Placement for patient upon discharge: tbd  Hospice Care: no  Patient appropriate for Outpatient [address] [facility] [address]: active    Patient/Caregiver Teaching:  Level of patient/caregiver understanding able to:   Voiced understanding.         Electronically signed by Chai Sanford RN, Nery Duran on 4/20/2022 at 12:46 PM

## 2022-04-20 NOTE — DISCHARGE SUMMARY
Discharge Summary           Name:  Billie Zurita /Age/Sex: 1990  (33 y.o. female)   MRN & CSN:  [id number] & [id number] Admission Date/Time: 2022  5:38 PM   Attending:  Diana Mcqueen MD Discharging Physician: Maisha Dale APRN - CNP     Hospital Course:   Billie Zurita is a 33 y.o.  female with PMH anxiety and depression, essential hypertension, chronic pressure ulcer of right buttock, morbid obesity with BMI of 44 kg/m², paraplegic, livs in group home presented to [facility] on 2020 with complaints of abdominal pain. She was found to have possible gastroenteritis was hospitalized for further work-up/treatment and GI/general surgery evaluation. Hospital course as follows; patient apparently presented to ER with complaints of abdominal discomfort, nausea, vomiting and diarrhea that started approximately 3 days prior to arrival.  She denies fevers or chills. Who presents to the emergency room with complaints of abdominal discomfort, nausea, vomiting, diarrhea, ongoing for the past 3 days, no fevers or chills. Lab work essentially unremarkable; no obvious infectious source. No metabolic derangements. Abdominal CT showed nonobstructive gaseous distention of the bowel and and an elongated sigmoid flexure that may predispose the patient to sigmoid volvulus and possibly intermittent volvulus, mild mural thickening suggestive of mild nonspecific colitis. She was evaluated by GI who noted no evidence of sigmoid volvulus and suspected symptoms were from gastroenteritis. She was also evaluated by general surgery who also noted no concern for volvulus, recommended outpatient colonoscopy otherwise had no recommendations. On day of discharge patient appeared to be back to baseline. She was eating regular diet at time of my exam, had no GI complaints at all. No abdominal pain, no nausea vomiting or diarrhea. Says she was back to baseline and wanted to go home.   Of note there was possible concern for suicidal ideation in the ER; on my exam patient stated she was depressed because it is nearing the anniversary of her sister's death and she wanted to talk to psychiatry; she thought the only way she could see psychiatry as if she said she was suicidal but denied active suicidal ideation and had no plan. She was evaluated by psychiatry prior to discharge who recommended increasing home Celexa which was done. She follows with psychiatry outpatient. Recommend follow-up with PCP within 1 to 2 weeks, also needs outpatient colonoscopy and follow-up with psychiatry    Consults this admission:  [address] TO GI  IP CONSULT TO PSYCHOLOGY  IP CONSULT TO HOSPITALIST  IP CONSULT TO PSYCHIATRY  IP CONSULT TO PSYCHIATRY  IP CONSULT TO DIETITIAN    Discharge Instruction:   Follow up appointments:   Primary care physician:  within 2 weeks    Diet:  regular diet   Activity: activity as tolerated  Disposition: Discharged to:   []Home, [x]C, []SNF, []Acute Rehab, []Hospice Condition on discharge: Stable    Discharge Medications:        Medication List      CHANGE how you take these medications    citalopram 20 MG tablet  Commonly known as: CELEXA  Take 1 tablet by mouth daily  Start taking on: April 21, 2022  What changed:   medication strength  how much to take        CONTINUE taking these medications    Diapers & Supplies Misc  XL diapers. Patient prefers ones that has sticky tabs on sides since she is a paraplegic. Change diaper every 4 hours.      hydrOXYzine 25 MG capsule  Commonly known as: VISTARIL  Take 1 capsule by mouth 2 times daily     ibuprofen 800 MG tablet  Commonly known as: ADVIL;MOTRIN  Take 1 tablet by mouth every 6 hours as needed for Pain     white petrolatum Gel  Commonly known as: Vaseline  Apply 28 g topically as needed for Dry Skin           Where to Get Your Medications      You can get these medications from any pharmacy    Bring a paper prescription for each of these medications  citalopram 20 MG tablet         Objective Findings at Discharge:   /70   Pulse 79   Temp 98.1 °F (36.7 °C) (Oral)   Resp 14   Ht 5' 3\" (1.6 m)   Wt 164 lb 7.4 oz (74.6 kg)   SpO2 97%   BMI 29.13 kg/m²            General: 69-year-old who appears stated age and chronically ill, in no acute distress  Eyes: eyelids/lashes normal appearence. Pupils equal, round and reactive. No scleral erythema, discharge, or conjunctivitis. HENT: bilateral ear and nose normal in appearance. Mucous membranes moist. Hearing grossly intact. Neck supple, no apparent thyromegaly or masses. Respiratory: lungs lear to auscultation, no wheezes, rales or rhonchi. Symmetric chest movement while on room air. Cardiovascular: RRR, S1/S2 auscultated. No murmurs, rubs, or gallops. No JVD or carotid bruits. Peripheral pulses equal bilaterally and palpable. No peripheral edema. GI: abdomen soft. No tenderness, masses, or guarding. Bowel sounds are normoactive. Rectal exam deferred. : No costovertebral angle tenderness. Mcclendon catheter is not present. Heme/lymph: no palpable cervical lymphadenopathy and no hepatosplenomegaly. No petechiae or ecchymoses. Musculoskeletal: Full ROM of all 4 extremities, strength 5/5 in all extremities. No gross joint deformities. Skin: intact, no rashes or lesions. Neurological: Bilateral lower extremities flaccid  Psych: LOC X4, calm and cooperative. Affect appropriate.     BMP/CBC  Recent Labs     04/19/22  1828      K 3.9      CO2 26   BUN 9   CREATININE 0.4*   WBC 10.4   HCT 37.5                Electronically signed by PRECIOUS Ellison CNP on 4/20/2022 at 1:52 PM

## 2022-04-20 NOTE — PROGRESS NOTES
Patient's guardian Petrona Dash notified of patient's discharge. Reviewed discharge medications with him. No questions noted. Informed him transport was originally set up for 7 pm but they had just called and changed it to 545 pm. Informed him superior will be bringing her home and that all discharge paperwork will be sent home for him.  CW

## 2022-04-20 NOTE — PROGRESS NOTES
Transport home set up with [facility] for 7 pm. [facility] did not have anything available until 10 pm and [facility] 8 pm so soonest available chosen.  Thien Bishop RN notified Lolita Lawrence

## 2022-04-20 NOTE — PLAN OF CARE
Problem: Skin Integrity:  Goal: Will show no infection signs and symptoms  Description: Will show no infection signs and symptoms  4/20/2022 1637 by Jaimee Griffin RN  Outcome: Completed  6/54/8703 6192 by Rose Marie Quezada RN  Outcome: Progressing  0/72/2238 6138 by Rose Marie Quezada RN  Outcome: Progressing  Goal: Absence of new skin breakdown  Description: Absence of new skin breakdown  4/20/2022 1637 by Jaimee Griffin RN  Outcome: Completed  5/52/3677 5896 by Rose Marie Quezada RN  Outcome: Progressing  1/44/7419 2194 by Rose Marie Quezada RN  Outcome: Progressing     Problem: Falls - Risk of:  Goal: Will remain free from falls  Description: Will remain free from falls  4/20/2022 1637 by Jaimee Griffin RN  Outcome: Completed  7/94/1460 9660 by Rose Marie Quezada RN  Outcome: Progressing  7/44/3547 7169 by Rose Marie Quezada RN  Outcome: Progressing  Goal: Absence of physical injury  Description: Absence of physical injury  4/20/2022 1637 by Jaimee Griffin RN  Outcome: Completed  8/66/6129 8392 by Rose Marie Quezada RN  Outcome: Progressing  0/18/6468 8242 by Rose Marie Quezada RN  Outcome: Progressing     Problem: Discharge Planning  Goal: Discharge to home or other facility with appropriate resources  Outcome: Completed     Problem: Pain  Goal: Verbalizes/displays adequate comfort level or baseline comfort level  Outcome: Completed

## 2022-04-20 NOTE — CONSULTS
Department of GeneralSurgery   Surgical Service   MARY PaulC   Consult Note      Date of Consult: 4/20/22    Reason for Consult:  Questionable sigmoid volvulus - colonic thickening  Requesting Physician:  Dr. Leta Lee:  Abd pain, diarrhea, N/V. History Obtained From:  patient, electronic medical record    HISTORY OF PRESENT ILLNESS:                The patient is a 33 y.o. female who presents with abd pain, diarrhea, N/V for several days prior to presentation to the ED last night. Pain is described as aching, cramping and pressure-like. Pain level is 0/10 currently. Sita fevers, chills. Today denies N/V. Is tolerating eating well. Pt had CT in the ED, which was reviewed:  Impression   Nonobstructive gaseous distention of bowel.       There is an elongated sigmoid flexure that may predispose the patient to a   sigmoid volvulus, possibly an intermittent volvulus. Ale Knuckles is mild mural   thickening at the rectosigmoid junction suggesting edema or a mild   nonspecific colitis.  Clinical correlation is recommended. GI has seen today.      Past Medical History:    Past Medical History:   Diagnosis Date    Anxiety     Back pain     Depression     Encephalitis     Hypertension     Meningitis     [facility]aplegia ([facility]     Suicidal ideation     Urinary incontinence        Past Surgical History:    Past Surgical History:   Procedure Laterality Date    PRE-MALIGNANT / BENIGN SKIN LESION EXCISION      2015 left shoulder       Current Medications:   Current Facility-Administered Medications   Medication Dose Route Frequency Provider Last Rate Last Admin    citalopram (CELEXA) tablet 10 mg  10 mg Oral Daily Ce Bush MD   10 mg at 04/20/22 0947    hydrOXYzine (VISTARIL) capsule 25 mg  25 mg Oral BID Ce Bush MD   25 mg at 04/20/22 0947    sodium chloride flush 0.9 % injection 5-40 mL  5-40 mL IntraVENous 2 times per day Ce Bush MD   10 mL at 04/20/22 0948    sodium chloride flush 0.9 % injection 10 mL  10 mL IntraVENous PRN Jorge Jarvis MD        0.9 % sodium chloride infusion   IntraVENous PRN Neel Cooper MD        enoxaparin (LOVENOX) injection 40 mg  40 mg SubCUTAneous Daily Neel Cooper MD        ondansetron (ZOFRAN-ODT) disintegrating tablet 4 mg  4 mg Oral Q8H PRN Neel Cooper MD        Or    ondansetron (ZOFRAN) injection 4 mg  4 mg IntraVENous Q6H PRN Neel Cooper MD        polyethylene glycol (GLYCOLAX) packet 17 g  17 g Oral Daily PRN Neel Cooper MD        acetaminophen (TYLENOL) tablet 650 mg  650 mg Oral Q6H PRN Neel Cooper MD        Or    acetaminophen (TYLENOL) suppository 650 mg  650 mg Rectal Q6H PRN Neel Cooper MD        potassium chloride (KLOR-CON M) extended release tablet 40 mEq  40 mEq Oral PRN Neel Cooper MD        Or    potassium bicarb-citric acid (EFFER-K) effervescent tablet 40 mEq  40 mEq Oral PRN Neel Cooper MD        Or    potassium chloride 10 mEq/100 mL IVPB (Peripheral Line)  10 mEq IntraVENous PRN Neel Cooper MD        magnesium sulfate 2000 mg in 50 mL IVPB premix  2,000 mg IntraVENous PRN Neel Cooper MD        morphine (PF) injection 2 mg  2 mg IntraVENous Q4H PRN Neel Cooper MD        oxyCODONE (ROXICODONE) immediate release tablet 5 mg  5 mg Oral Q4H PRN Neel Cooper MD        sodium phosphate 18.15 mmol in dextrose 5 % 250 mL IVPB  0.16 mmol/kg IntraVENous PRN Neel Cooper MD        Or    sodium phosphate 36.3 mmol in dextrose 5 % 500 mL IVPB  0.32 mmol/kg IntraVENous PRN Neel Cooper MD        [START ON 4/21/2022] pantoprazole (PROTONIX) tablet 40 mg  40 mg Oral QAM AC Iqra Alberto, APRN - CNP        ondansetron (ZOFRAN) injection 4 mg  4 mg IntraVENous Q6H PRN Amy Clarke MD           Allergies:  Fish-derived products, Lemon extract [flavoring agent], Milk-related compounds, and Trazodone and nefazodone    Social History:   Social History     Socioeconomic History    Marital status: Single     Spouse name: None    Number of children: None    Years of education: None    Highest education level: None   Occupational History    None   Tobacco Use    Smoking status: Never Smoker    Smokeless tobacco: Never Used   Vaping Use    Vaping Use: Never used   Substance and Sexual Activity    Alcohol use: No    Drug use: No    Sexual activity: Never   Other Topics Concern    None   Social History Narrative    Lives in group home. Wheelchair-bound since meningitis at 13 yo     Social Determinants of Health     Financial Resource Strain:     Difficulty of Paying Living Expenses: Not on file   Food Insecurity:     Worried About Running Out of Food in the Last Year: Not on file    [place] of Food in the Last Year: Not on file   Transportation Needs:     Lack of Transportation (Medical): Not on file    Lack of Transportation (Non-Medical):  Not on file   Physical Activity:     Days of Exercise per Week: Not on file    Minutes of Exercise per Session: Not on file   Stress:     Feeling of Stress : Not on file   Social Connections:     Frequency of Communication with Friends and Family: Not on file    Frequency of Social Gatherings with Friends and Family: Not on file    Attends Denominational Services: Not on file    Active Member of [address] [place] [address] [employer] or Organizations: Not on file    Attends Club or Organization Meetings: Not on file    Marital Status: Not on file   Intimate Partner Violence:     Fear of Current or Ex-Partner: Not on file    Emotionally Abused: Not on file    Physically Abused: Not on file    Sexually Abused: Not on file   Housing Stability:     Unable to Pay for Housing in the Last Year: Not on file    Number of [place] in the Last Year: Not on file    Unstable Housing in the Last Year: Not on file       Family History:   Family History   Family history unknown: Yes       REVIEW OFSYSTEMS:    Review of Systems   Constitutional: Negative for chills and fever. HENT: Negative for ear pain, mouth sores, sore throat and tinnitus. Eyes: Negative for photophobia, redness and itching. Respiratory: Negative for apnea, choking and stridor. Cardiovascular: Negative for chest pain and palpitations. Gastrointestinal: Negative for abdominal pain, anal bleeding, blood in stool, constipation, diarrhea, nausea, rectal pain and vomiting. Endocrine: Negative for polydipsia. Genitourinary: Negative for enuresis, flank pain and hematuria. Musculoskeletal: Negative for back pain, joint swelling and myalgias. Skin: Negative for color change and pallor. Allergic/Immunologic: Negative for environmental allergies. Neurological: Negative for syncope and speech difficulty. Psychiatric/Behavioral: Negative for confusion and hallucinations. PHYSICAL EXAM:  Vitals:    04/19/22 1738 04/20/22 0500 04/20/22 0519 04/20/22 0938   BP: 116/80 106/69  107/70   Pulse: 87 80 81 79   Resp: 18 12  14   Temp: 98.2 °F (36.8 °C) 98.1 °F (36.7 °C)  98.1 °F (36.7 °C)   TempSrc:  Oral  Oral   SpO2: 98% 97%  97%   Weight: 250 lb (113.4 kg) 164 lb 7.4 oz (74.6 kg)     Height: 5' 3\" (1.6 m) 5' 3\" (1.6 m)         Physical Exam  Constitutional:       Appearance: She is well-developed. HENT:      Head: Normocephalic. Eyes:      Pupils: Pupils are equal, round, and reactive to light. Cardiovascular:      Rate and Rhythm: Normal rate. Pulmonary:      Effort: Pulmonary effort is normal.   Abdominal:      General: There is no distension. Palpations: Abdomen is soft. There is no mass. Tenderness: There is no abdominal tenderness. There is no guarding or rebound. Musculoskeletal:         General: Normal range of motion. Cervical back: Normal range of motion and neck supple. Skin:     General: Skin is warm.    Neurological:      Mental Status: She is alert and oriented to person, place, and time.       DATA:    CBC:   Lab Results   Component Value Date    WBC 10.4 04/19/2022    RBC 4.50 04/19/2022    HGB 11.4 04/19/2022    HCT 37.5 04/19/2022    MCV 83.3 04/19/2022    MCH 25.3 04/19/2022    MCHC 30.4 04/19/2022    RDW 14.3 04/19/2022     04/19/2022    MPV 8.8 04/19/2022     CMP:    Lab Results   Component Value Date     04/19/2022    K 3.9 04/19/2022     04/19/2022    CO2 26 04/19/2022    BUN 9 04/19/2022    CREATININE 0.4 04/19/2022    GFRAA >60 04/19/2022    AGRATIO 1.4 08/29/2019    LABGLOM >60 04/19/2022    GLUCOSE 97 04/19/2022    PROT 7.3 04/19/2022    PROT 7.5 10/27/2012    LABALBU 3.6 04/19/2022    CALCIUM 8.9 04/19/2022    BILITOT 0.1 04/19/2022    ALKPHOS 93 04/19/2022    AST 17 04/19/2022    ALT 14 04/19/2022       IMPRESSION:        Patient Active Problem List:     Nevus     Major depressive disorder     Hallucinations     WD-Paraplegia (HCC)     Non-pressure chronic ulcer of buttock, limited to breakdown of skin (HCC)     Decubitus ulcer of right perineal ischial region, stage I     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 Pt would benefit from outpt colonoscopy - would defer to GI. Tolerating diet. No surgical intervention needed. Will s/o. Patient and plan of care discussed with Dr. Bam Werner.     Noman Curran PA-C
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
## 2022-04-20 NOTE — CARE COORDINATION
CM spoke to pts doyleryan Hanson Aleyda regarding pts discharge plans. Guardian states that the pt has a PCP and is able to afford her Rx's with her insurance. Pt lives in a group home in [place] and plan is for her to return on discharge. No other needs were identified at this time. Please contact CM if the need should arise.

## 2022-04-22 ENCOUNTER — HOSPITAL ENCOUNTER (OUTPATIENT)
Dept: WOUND CARE | Age: 32
Discharge: HOME | End: 2022-04-22
Payer: COMMERCIAL

## 2022-04-22 VITALS
TEMPERATURE: 97.9 F | RESPIRATION RATE: 14 BRPM | HEART RATE: 74 BPM | DIASTOLIC BLOOD PRESSURE: 63 MMHG | SYSTOLIC BLOOD PRESSURE: 113 MMHG

## 2022-04-22 DIAGNOSIS — G82.20 PARAPLEGIA (HCC): ICD-10-CM

## 2022-04-22 DIAGNOSIS — L89.313 STAGE III PRESSURE ULCER OF RIGHT BUTTOCK (HCC): Primary | ICD-10-CM

## 2022-04-22 PROCEDURE — 11042 DBRDMT SUBQ TIS 1ST 20SQCM/<: CPT

## 2022-04-22 PROCEDURE — 6370000000 HC RX 637 (ALT 250 FOR IP): Performed by: INTERNAL MEDICINE

## 2022-04-22 RX ORDER — BACITRACIN ZINC AND POLYMYXIN B SULFATE 500; 1000 [USP'U]/G; [USP'U]/G
OINTMENT TOPICAL ONCE
Status: CANCELLED | OUTPATIENT
Start: 2022-04-22 | End: 2022-04-22

## 2022-04-22 RX ORDER — BACITRACIN, NEOMYCIN, POLYMYXIN B 400; 3.5; 5 [USP'U]/G; MG/G; [USP'U]/G
OINTMENT TOPICAL ONCE
Status: CANCELLED | OUTPATIENT
Start: 2022-04-22 | End: 2022-04-22

## 2022-04-22 RX ORDER — LIDOCAINE 40 MG/G
CREAM TOPICAL ONCE
Status: CANCELLED | OUTPATIENT
Start: 2022-04-22 | End: 2022-04-22

## 2022-04-22 RX ORDER — GENTAMICIN SULFATE 1 MG/G
OINTMENT TOPICAL ONCE
Status: CANCELLED | OUTPATIENT
Start: 2022-04-22 | End: 2022-04-22

## 2022-04-22 RX ORDER — LIDOCAINE 50 MG/G
OINTMENT TOPICAL ONCE
Status: CANCELLED | OUTPATIENT
Start: 2022-04-22 | End: 2022-04-22

## 2022-04-22 RX ORDER — LIDOCAINE HYDROCHLORIDE 20 MG/ML
JELLY TOPICAL ONCE
Status: CANCELLED | OUTPATIENT
Start: 2022-04-22 | End: 2022-04-22

## 2022-04-22 RX ORDER — CLOBETASOL PROPIONATE 0.5 MG/G
OINTMENT TOPICAL ONCE
Status: CANCELLED | OUTPATIENT
Start: 2022-04-22 | End: 2022-04-22

## 2022-04-22 RX ORDER — CLOBETASOL PROPIONATE 0.5 MG/G
OINTMENT TOPICAL ONCE
Status: COMPLETED | OUTPATIENT
Start: 2022-04-22 | End: 2022-04-22

## 2022-04-22 RX ORDER — LIDOCAINE HYDROCHLORIDE 40 MG/ML
SOLUTION TOPICAL ONCE
Status: CANCELLED | OUTPATIENT
Start: 2022-04-22 | End: 2022-04-22

## 2022-04-22 RX ORDER — GINSENG 100 MG
CAPSULE ORAL ONCE
Status: CANCELLED | OUTPATIENT
Start: 2022-04-22 | End: 2022-04-22

## 2022-04-22 RX ORDER — BETAMETHASONE DIPROPIONATE 0.05 %
OINTMENT (GRAM) TOPICAL ONCE
Status: CANCELLED | OUTPATIENT
Start: 2022-04-22 | End: 2022-04-22

## 2022-04-22 RX ADMIN — CLOBETASOL PROPIONATE: 0.5 OINTMENT TOPICAL at 13:45

## 2022-04-22 ASSESSMENT — PAIN SCALES - GENERAL: PAINLEVEL_OUTOF10: 0

## 2022-04-22 NOTE — PROGRESS NOTES
Wound Care Center Progress Note With Procedure    Alejandro Gaston  AGE: 33 y.o. GENDER: female  : 1990  EPISODE DATE:  2022     Subjective:     Chief Complaint   Patient presents with    Wound Check         HISTORY of PRESENT ILLNESS      Alejandro Gaston is a 33 y.o. female who presents today for wound evaluation of Chronic pressure ulcer(s) of the buttock. The ulcer is of mild severity. The underlying cause of the wound is pressure and shearing. She is in for f/u- has increased periwound breakdown in the same shape as bordered dressing, so this was stopped and no adhesives have been used on the area, which has been helping per aid.     Wound Pain Timing/Severity: mild  Quality of pain: N/A  Severity of pain:  1 / 10   Modifying Factors: chronic pressure and obesity  Associated Signs/Symptoms: none        PAST MEDICAL HISTORY        Diagnosis Date    Anxiety     Back pain     Depression     Encephalitis     Hypertension     Meningitis     Paraplegia (HCC)     Suicidal ideation     Urinary incontinence        PAST SURGICAL HISTORY    Past Surgical History:   Procedure Laterality Date    PRE-MALIGNANT / BENIGN SKIN LESION EXCISION       left shoulder       FAMILY HISTORY    Family History   Family history unknown: Yes       SOCIAL HISTORY    Social History     Tobacco Use    Smoking status: Never Smoker    Smokeless tobacco: Never Used   Vaping Use    Vaping Use: Never used   Substance Use Topics    Alcohol use: No    Drug use: No       ALLERGIES    Allergies   Allergen Reactions    Fish-Derived Products     Lemon Extract [Flavoring Agent]     Milk-Related Compounds     Trazodone And Nefazodone      Causes nightmares         MEDICATIONS    Current Outpatient Medications on File Prior to Encounter   Medication Sig Dispense Refill    citalopram (CELEXA) 20 MG tablet Take 1 tablet by mouth daily 30 tablet 0    hydrOXYzine (VISTARIL) 25 MG capsule Take 1 capsule by mouth 2 times daily 60 capsule 3    white petrolatum (VASELINE) GEL Apply 28 g topically as needed for Dry Skin 212 g 3    ibuprofen (ADVIL;MOTRIN) 800 MG tablet Take 1 tablet by mouth every 6 hours as needed for Pain 120 tablet 1    Diapers & Supplies MISC XL diapers. Patient prefers ones that has sticky tabs on sides since she is a paraplegic. Change diaper every 4 hours. 120 each 12     No current facility-administered medications on file prior to encounter. REVIEW OF SYSTEMS    Pertinent items are noted in HPI. Constitutional: Negative for systemic symptoms including fever, chills and malaise. Objective:      /63   Pulse 74   Temp 97.9 °F (36.6 °C)   Resp 14     PHYSICAL EXAM       General: The patient is in no acute distress. Mental status:  Patient is appropriate, is  oriented to place and plan of care. Dermatologic exam: Visual inspection of the periwound reveals the skin to be normal in turgor and texture  Wound exam: see wound description below in procedure note      Assessment:     Problem List Items Addressed This Visit     WD-Paraplegia ([address]    WD-Stage III pressure ulcer of right buttock ([address] - Primary    Relevant Orders    Initiate Outpatient Wound Care Protocol        Procedure Note    Indications:  Based on my examination of this patient's wound(s) today, sharp excision into necrotic epidermis, dermis and subcutaneous tissue is required to promote healing and evaluate the extent of previous healing. Performed by: Blaine Peres MD    Consent obtained: Yes    Time out taken:  Yes    Pain Control: none       Debridement:Excisional Debridement    Using curette the wound(s) was/were sharply debrided down through and including the removal of epidermis, dermis and subcutaneous tissue.         Devitalized Tissue Debrided:  fibrin, biofilm and slough    Pre Debridement Measurements:  Are located in the Wound Documentation Flow Sheet    All active wounds listed below with Achieved:  by pressure    Procedural Pain:  0  / 10     Post Procedural Pain:  0 / 10     Response to treatment:  Well tolerated by patient. Status of wound progress and description from last visit:   Worse ginger-wound. Plan:       Discharge Instructions       PHYSICIAN ORDERS AND DISCHARGE INSTRUCTIONS     NOTE: Upon discharge from the [address], you will receive a patient experience survey. We would be grateful if you would take the time to fill this survey out.     Wound care order history:                 KEVIN's   Right       Left               Date:              Cultures:  Buttock wound cultured 5/21/21                                Obtained on 2/11/2022                                Cultured on 3/11/2022              Grafts:                Antibiotics:           Continuing wound care orders and information:                 Residence:                Continue home health care with: [address], supplies  Aide only not nursing  Fax #[phone]                [facility] [address] [place] [address] guardian  [id number]              Your wound-care supplies will be provided by:      Wound cleansing:               JZ not scrub or use excessive force.              Wash hands with soap and water before and after dressing changes.               GCRWW to applying a clean dressing, cleanse wound with normal saline, wound cleanser, or mild soap and water.               Ask the physician or nurse before getting the wound(s) wet in a shower     Daily Wound management:              Keep weight off wounds and reposition every 2 hours.              Avoid standing for long periods of time.              Apply wraps/stockings in AM and remove at bedtime.              If swelling is present, elevate legs to the level of the heart or above for 30 minutes 4-5 times a day and/or when sitting.                                      When taking antibiotics take entire prescription as ordered by physician do not stop taking until medicine is all gone.                                                           Orders for this week: 4/22/2022     Buttock wound cultured 5/21/21,2/11/2022     [facility]: [facility] [address] to periwound    Right Buttock - wash with soap and water, pat dry. Apply Hydrofera blue ready border. ( Clobetasol to ginger care today in clinic only ) Change daily and as needed.     Pressure reduction will facilitate healing, turn and reposition at least every 2 hours. Sent for pressure reduction mattress/pad from [address] [place] [phone].        Follow up with Dr Jarvis Elizabeth 2 weeks in the wound care center. Call [phone] for any questions or concerns.   Date__________   Time___________               Treatment Note      Written Patient Dismissal Instructions Given            Electronically signed by Quintin Roth MD on 4/22/2022 at 1:34 PM

## 2022-04-22 NOTE — PLAN OF CARE
Problem: Safety - Adult  Goal: Free from fall injury  Outcome: Progressing     Problem: Skin/Tissue Integrity  Goal: Absence of new skin breakdown  Description: 1. Monitor for areas of redness and/or skin breakdown  2. Assess vascular access sites hourly  3. Every 4-6 hours minimum:  Change oxygen saturation probe site  4. Every 4-6 hours:  If on nasal continuous positive airway pressure, respiratory therapy assess nares and determine need for appliance change or resting period.   Outcome: Progressing

## 2022-04-29 ENCOUNTER — HOSPITAL ENCOUNTER (OUTPATIENT)
Dept: WOUND CARE | Age: 32
Discharge: HOME | End: 2022-04-29
Payer: COMMERCIAL

## 2022-04-29 VITALS
DIASTOLIC BLOOD PRESSURE: 69 MMHG | HEART RATE: 74 BPM | SYSTOLIC BLOOD PRESSURE: 105 MMHG | TEMPERATURE: 97.6 F | RESPIRATION RATE: 16 BRPM

## 2022-04-29 DIAGNOSIS — L89.313 STAGE III PRESSURE ULCER OF RIGHT BUTTOCK (HCC): Primary | ICD-10-CM

## 2022-04-29 PROCEDURE — 99213 OFFICE O/P EST LOW 20 MIN: CPT

## 2022-04-29 RX ORDER — LIDOCAINE HYDROCHLORIDE 20 MG/ML
JELLY TOPICAL ONCE
Status: CANCELLED | OUTPATIENT
Start: 2022-04-29 | End: 2022-04-29

## 2022-04-29 RX ORDER — LIDOCAINE 40 MG/G
CREAM TOPICAL ONCE
Status: CANCELLED | OUTPATIENT
Start: 2022-04-29 | End: 2022-04-29

## 2022-04-29 RX ORDER — CLOBETASOL PROPIONATE 0.5 MG/G
OINTMENT TOPICAL ONCE
Status: DISCONTINUED | OUTPATIENT
Start: 2022-04-29 | End: 2022-04-30 | Stop reason: HOSPADM

## 2022-04-29 RX ORDER — GINSENG 100 MG
CAPSULE ORAL ONCE
Status: CANCELLED | OUTPATIENT
Start: 2022-04-29 | End: 2022-04-29

## 2022-04-29 RX ORDER — LIDOCAINE 50 MG/G
OINTMENT TOPICAL ONCE
Status: CANCELLED | OUTPATIENT
Start: 2022-04-29 | End: 2022-04-29

## 2022-04-29 RX ORDER — LIDOCAINE HYDROCHLORIDE 40 MG/ML
SOLUTION TOPICAL ONCE
Status: CANCELLED | OUTPATIENT
Start: 2022-04-29 | End: 2022-04-29

## 2022-04-29 RX ORDER — BACITRACIN, NEOMYCIN, POLYMYXIN B 400; 3.5; 5 [USP'U]/G; MG/G; [USP'U]/G
OINTMENT TOPICAL ONCE
Status: CANCELLED | OUTPATIENT
Start: 2022-04-29 | End: 2022-04-29

## 2022-04-29 RX ORDER — CLOBETASOL PROPIONATE 0.5 MG/G
OINTMENT TOPICAL ONCE
Status: CANCELLED | OUTPATIENT
Start: 2022-04-29 | End: 2022-04-29

## 2022-04-29 RX ORDER — GENTAMICIN SULFATE 1 MG/G
OINTMENT TOPICAL ONCE
Status: CANCELLED | OUTPATIENT
Start: 2022-04-29 | End: 2022-04-29

## 2022-04-29 RX ORDER — BACITRACIN ZINC AND POLYMYXIN B SULFATE 500; 1000 [USP'U]/G; [USP'U]/G
OINTMENT TOPICAL ONCE
Status: CANCELLED | OUTPATIENT
Start: 2022-04-29 | End: 2022-04-29

## 2022-04-29 RX ORDER — BETAMETHASONE DIPROPIONATE 0.05 %
OINTMENT (GRAM) TOPICAL ONCE
Status: CANCELLED | OUTPATIENT
Start: 2022-04-29 | End: 2022-04-29

## 2022-04-29 ASSESSMENT — PAIN SCALES - GENERAL: PAINLEVEL_OUTOF10: 0

## 2022-04-29 NOTE — PROGRESS NOTES
Wound Care Center Progress Note       Todd Hernandez  AGE: 33 y.o. GENDER: female  : 1990  TODAY'S DATE:  2022        Subjective:     Chief Complaint   Patient presents with    Wound Check     right buttock         HISTORY of PRESENT ILLNESS     Todd Hernandez is a 33 y.o. female who presents today for wound evaluation of Chronic pressure ulcer(s) of right buttock/upper thigh. The ulcer is of mild severity. The underlying cause of the wound is pressure. She presents today with a wound that is almost healed! No new issues. She is doing great.   Wound Pain Timing/Severity: none  Quality of pain: N/A  Severity of pain:  0 / 10   Modifying Factors: chronic pressure and decreased mobility  Associated Signs/Symptoms: none        PAST MEDICAL HISTORY        Diagnosis Date    Anxiety     Back pain     Depression     Encephalitis     Hypertension     Meningitis     Paraplegia (HCC)     Suicidal ideation     Urinary incontinence        PAST SURGICAL HISTORY    Past Surgical History:   Procedure Laterality Date    PRE-MALIGNANT / BENIGN SKIN LESION EXCISION       left shoulder       FAMILY HISTORY    Family History   Family history unknown: Yes       SOCIAL HISTORY    Social History     Tobacco Use    Smoking status: Never Smoker    Smokeless tobacco: Never Used   Vaping Use    Vaping Use: Never used   Substance Use Topics    Alcohol use: No    Drug use: No       ALLERGIES    Allergies   Allergen Reactions    Fish-Derived Products     Lemon Extract [Flavoring Agent]     Milk-Related Compounds     Trazodone And Nefazodone      Causes nightmares         MEDICATIONS    Current Outpatient Medications on File Prior to Encounter   Medication Sig Dispense Refill    citalopram (CELEXA) 20 MG tablet Take 1 tablet by mouth daily 30 tablet 0    hydrOXYzine (VISTARIL) 25 MG capsule Take 1 capsule by mouth 2 times daily 60 capsule 3    white petrolatum (VASELINE) GEL Apply 28 g (cm) 0.3 cm 04/22/22 1327   Post-Procedure Surface Area (cm^2) 1.05 cm^2 04/22/22 1327   Post-Procedure Volume (cm^3) 0.315 cm^3 04/22/22 1327   Distance Tunneling (cm) 0 cm 04/29/22 1316   Tunneling Position ___ O'Clock 0 04/29/22 1316   Undermining Starts ___ O'Clock 0 04/29/22 1316   Undermining Ends___ O'Clock 0 04/29/22 1316   Undermining Maxium Distance (cm) 0 04/29/22 1316   Wound Assessment Granulation tissue 04/29/22 1316   Drainage Amount Small 04/29/22 1316   Drainage Description Serosanguinous 04/29/22 1316   Odor None 04/29/22 1316   Dyan-wound Assessment Blanchable erythema 04/29/22 1316   Margins Defined edges; Undefined edges 04/29/22 1316   Wound Thickness Description not for Pressure Injury Full thickness 04/29/22 1316   Number of days: 76       Assessment:       Problem List Items Addressed This Visit     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 Plan:     Discharge Instructions       PHYSICIAN ORDERS AND DISCHARGE INSTRUCTIONS     NOTE: Upon discharge from the [address], you will receive a patient experience survey.  We would be grateful if you would take the time to fill this survey out.     Wound care order history:                 KEVIN's   Right       Left               Date:              Cultures:  Buttock wound cultured 5/21/21                                Obtained on 2/11/2022                                Cultured on 3/11/2022              Grafts:                Antibiotics:           Continuing wound care orders and information:                 Residence:                Continue home health care with: Srini Moulton, supplies  Aide only not nursing  Fax #[phone]   [facility] Legal guardian [phone]                            Your wound-care supplies will be provided by:      Wound cleansing:               ZV not scrub or use excessive force.              Wash hands with soap and water before and after dressing changes.             RDMHI to applying a clean dressing, cleanse wound with normal saline, wound cleanser, or mild soap and water.               Ask the physician or nurse before getting the wound(s) wet in a shower     Daily Wound management:              Keep weight off wounds and reposition every 2 hours.              Avoid standing for long periods of time.              Apply wraps/stockings in AM and remove at bedtime.              If swelling is present, elevate legs to the level of the heart or above for 30 minutes 4-5 times a day and/or when sitting.                                      When taking antibiotics take entire prescription as ordered by physician do not stop taking until medicine is all gone.                                                           Orders for this week: 4/29/2022     Buttock wound cultured 5/21/21,2/11/2022     Rx: [facility]'s [address] [place] [address] in [address] [place] [address] to periwound     Right Buttock - wash with soap and water, pat dry. Apply Hydrofera blue ready border. (Clobetasol to ginger care today in clinic only) Change daily and as needed.     Pressure reduction will facilitate healing, turn and reposition at least every 2 hours.        Follow up with Dr Ashley Goodwin 2 weeks in the wound care center. Call [phone] for any questions or concerns.   Date__________   Time___________        Treatment Note Wound 02/11/22 #1 Right Buttock-Dressing/Treatment:  (hydrofera blue 4x4 gauze clobetasol ginger )    Written Patient Dismissal Instructions Given            Electronically signed by Cat Oseguera MD on 4/29/2022 at 2:01 PM

## 2022-05-09 ENCOUNTER — TELEMEDICINE (OUTPATIENT)
Dept: FAMILY MEDICINE CLINIC | Age: 32
End: 2022-05-09
Payer: COMMERCIAL

## 2022-05-09 ENCOUNTER — TELEPHONE (OUTPATIENT)
Dept: FAMILY MEDICINE CLINIC | Age: 32
End: 2022-05-09

## 2022-05-09 DIAGNOSIS — G47.09 OTHER INSOMNIA: ICD-10-CM

## 2022-05-09 DIAGNOSIS — L89.313 STAGE III PRESSURE ULCER OF RIGHT BUTTOCK (HCC): ICD-10-CM

## 2022-05-09 DIAGNOSIS — G82.20 PARAPLEGIA (HCC): Primary | ICD-10-CM

## 2022-05-09 DIAGNOSIS — K52.9 ACUTE GASTROENTERITIS: ICD-10-CM

## 2022-05-09 DIAGNOSIS — F41.9 ANXIETY: ICD-10-CM

## 2022-05-09 DIAGNOSIS — B37.0 ORAL THRUSH: ICD-10-CM

## 2022-05-09 DIAGNOSIS — Z09 HOSPITAL DISCHARGE FOLLOW-UP: ICD-10-CM

## 2022-05-09 DIAGNOSIS — F33.9 EPISODE OF RECURRENT MAJOR DEPRESSIVE DISORDER, UNSPECIFIED DEPRESSION EPISODE SEVERITY (HCC): ICD-10-CM

## 2022-05-09 PROCEDURE — G8417 CALC BMI ABV UP PARAM F/U: HCPCS | Performed by: NURSE PRACTITIONER

## 2022-05-09 PROCEDURE — 99214 OFFICE O/P EST MOD 30 MIN: CPT | Performed by: NURSE PRACTITIONER

## 2022-05-09 PROCEDURE — 1036F TOBACCO NON-USER: CPT | Performed by: NURSE PRACTITIONER

## 2022-05-09 PROCEDURE — 1111F DSCHRG MED/CURRENT MED MERGE: CPT | Performed by: NURSE PRACTITIONER

## 2022-05-09 PROCEDURE — G8427 DOCREV CUR MEDS BY ELIG CLIN: HCPCS | Performed by: NURSE PRACTITIONER

## 2022-05-09 RX ORDER — BUSPIRONE HYDROCHLORIDE 5 MG/1
5 TABLET ORAL 3 TIMES DAILY
COMMUNITY

## 2022-05-09 SDOH — ECONOMIC STABILITY: FOOD INSECURITY: WITHIN THE PAST 12 MONTHS, YOU WORRIED THAT YOUR FOOD WOULD RUN OUT BEFORE YOU GOT MONEY TO BUY MORE.: NEVER TRUE

## 2022-05-09 SDOH — ECONOMIC STABILITY: FOOD INSECURITY: WITHIN THE PAST 12 MONTHS, THE FOOD YOU BOUGHT JUST DIDN'T LAST AND YOU DIDN'T HAVE MONEY TO GET MORE.: NEVER TRUE

## 2022-05-09 ASSESSMENT — PATIENT HEALTH QUESTIONNAIRE - PHQ9
5. POOR APPETITE OR OVEREATING: 0
6. FEELING BAD ABOUT YOURSELF - OR THAT YOU ARE A FAILURE OR HAVE LET YOURSELF OR YOUR FAMILY DOWN: 0
3. TROUBLE FALLING OR STAYING ASLEEP: 3
8. MOVING OR SPEAKING SO SLOWLY THAT OTHER PEOPLE COULD HAVE NOTICED. OR THE OPPOSITE, BEING SO FIGETY OR RESTLESS THAT YOU HAVE BEEN MOVING AROUND A LOT MORE THAN USUAL: 0
4. FEELING TIRED OR HAVING LITTLE ENERGY: 0
9. THOUGHTS THAT YOU WOULD BE BETTER OFF DEAD, OR OF HURTING YOURSELF: 0
1. LITTLE INTEREST OR PLEASURE IN DOING THINGS: 3
SUM OF ALL RESPONSES TO PHQ9 QUESTIONS 1 & 2: 6
10. IF YOU CHECKED OFF ANY PROBLEMS, HOW DIFFICULT HAVE THESE PROBLEMS MADE IT FOR YOU TO DO YOUR WORK, TAKE CARE OF THINGS AT HOME, OR GET ALONG WITH OTHER PEOPLE: 3
SUM OF ALL RESPONSES TO PHQ QUESTIONS 1-9: 9
2. FEELING DOWN, DEPRESSED OR HOPELESS: 3
7. TROUBLE CONCENTRATING ON THINGS, SUCH AS READING THE NEWSPAPER OR WATCHING TELEVISION: 0
SUM OF ALL RESPONSES TO PHQ QUESTIONS 1-9: 9

## 2022-05-09 ASSESSMENT — SOCIAL DETERMINANTS OF HEALTH (SDOH): HOW HARD IS IT FOR YOU TO PAY FOR THE VERY BASICS LIKE FOOD, HOUSING, MEDICAL CARE, AND HEATING?: SOMEWHAT HARD

## 2022-05-09 NOTE — PROGRESS NOTES
Post-Discharge Transitional Care Management Progress Note      Cameron Rose   YOB: 1990    Date of Office Visit:  5/9/2022  Date of Hospital Admission: April 19, 2022  Date of Hospital Discharge: April 20, 2022    Care management risk score Rising risk (score 2-5) and Complex Care (Scores >=6): 4     Non face to face  following discharge, date last encounter closed (first attempt may have been earlier): Patient has virtual visit 5-9-22. Call initiated 2 business days of discharge: yes    ASSESSMENT/PLAN:   Below is the assessment and plan developed based on review of pertinent history, physical exam, labs, studies, and medications. Paraplegia (HCC)  Acute gastroenteritis  Oral thrush  -     nystatin (MYCOSTATIN) 066091 UNIT/ML suspension; Take 5 mLs by mouth 4 times daily Swish and swallow 5 ml, 4 times a day, 8am, 12pm, 4pm, 8pm., Oral, 4 TIMES DAILY Starting Mon 5/9/2022, Disp-140 mL, R-0, Normal  Episode of recurrent major depressive disorder, unspecified depression episode severity (HCC)  Other insomnia  Anxiety  WD-Stage III pressure ulcer of right buttock [facility])      Medical Decision Making: moderate complexity  No follow-ups on file. On this date 5/9/2022 I have spent 40 minutes reviewing previous notes, test results and face to face with the patient discussing the diagnosis and importance of compliance with the treatment plan as well as documenting on the day of the visit. Subjective:   HPI:  Follow up of Hospital problems/diagnosis(es): Acute gastroenteritis    Rizwana has requested a video visit because the motor to her savanna lift is missing. She also is having trouble with transportation. She states she was seen in the ED and had subsequent admission for abdominal pain, nausea, vomiting and diarrhea for 3 days.  Abdominal CT showed nonobstructive gaseous distention of the bowel and and an elongated sigmoid flexure that may predispose the patient to sigmoid volvulus and possibly intermittent volvulus, mild mural thickening suggestive of mild nonspecific colitis. She was evaluated by GI and a surgeon and recommended that she have a colonoscopy on an outpatient basis. While in the ED there was some concern for possible suicide ideation. She expressed some depression because it was the anniversary of her sister's death. She did speak with psychiatry which she did and her Celexa was increased as well as scheduling a follow up with psychiatry. Today, she denies suicide ideation. She states she is down because she is bored and her partner lives upstairs from her and does not visit as often as she would like. While in the hospital she was also found to have a stage 3 wound on the right buttock and was to follow up with the wound clinic which states she has done so. She states the last visit to the wound clinic said the ulcer was healed. Inpatient course: Discharge summary reviewed- see chart. 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 If you have any questions, ask your nurse or doctor.             START taking these medications    nystatin 903647 UNIT/ML suspension  Commonly known as: MYCOSTATIN  Take 5 mLs by mouth 4 times daily Swish and swallow 5 ml, 4 times a day, 8am, 12pm, 4pm, 8pm.  Started by: Laine Gomez, [address] [address] taking these medications    busPIRone 5 MG tablet  Commonly known as: BUSPAR     citalopram 20 MG tablet  Commonly known as: CELEXA  Take 1 tablet by mouth daily     Diapers & Supplies Misc  XL diapers. Patient prefers ones that has sticky tabs on sides since she is a paraplegic. Change diaper every 4 hours. hydrOXYzine 25 MG capsule  Commonly known as: VISTARIL  Take 1 capsule by mouth 2 times daily     ibuprofen 800 MG tablet  Commonly known as: ADVIL;MOTRIN  Take 1 tablet by mouth every 6 hours as needed for Pain     white petrolatum Gel  Commonly known as: Vaseline  Apply 28 g topically as needed for Dry Skin           Where to Get Your Medications      These medications were sent to [address] [place] [address]. [address] [place], [place] [id number]    Phone: [phone]   · nystatin 154390 UNIT/ML suspension           Medications marked \"taking\" at this time  Outpatient Medications Marked as Taking for the 5/9/22 encounter (Telemedicine) with PRECIOUS Hopkins CNP   Medication Sig Dispense Refill    busPIRone (BUSPAR) 5 MG tablet Take 5 mg by mouth 3 times daily      nystatin (MYCOSTATIN) 895856 UNIT/ML suspension Take 5 mLs by mouth 4 times daily Swish and swallow 5 ml, 4 times a day, 8am, 12pm, 4pm, 8pm. 140 mL 0    citalopram (CELEXA) 20 MG tablet Take 1 tablet by mouth daily 30 tablet 0    hydrOXYzine (VISTARIL) 25 MG capsule Take 1 capsule by mouth 2 times daily 60 capsule 3    white petrolatum (VASELINE) GEL Apply 28 g topically as needed for Dry Skin 212 g 3    Diapers & Supplies MISC XL diapers. Patient prefers ones that has sticky tabs on sides since she is a paraplegic. Change diaper every 4 hours. 120 each 12        Medications patient taking as of now reconciled against medications ordered at time of hospital discharge:  Yes    A comprehensive review of systems was negative except for: Behavioral/Psych: positive for depression and mouth and tongue soreness    Objective:    Patient-Reported Vitals  Patient-Reported Systolic (Top): 733 mmHg  Patient-Reported Diastolic (Bottom): 75 mmHg  BP Observations: Yes, BP was taken on electronic monitoring device with digital readout  Patient-Reported Pulse: 75  Patient-Reported Temperature: Unable to obtain  Patient-Reported Weight: 164 7.4lb  Patient-Reported Height: 5 3      General Appearance: alert and oriented to person, place and time, well developed and well- nourished, in no acute distress  Skin: warm and dry, no rash or erythema  Head: normocephalic and atraumatic  Eyes: pupils equal, round, and reactive to light, extraocular eye movements intact, conjunctivae normal  Mouth: Tongue red, white streaks over midline. Neck: lymphadenopathy, right lower jaw  Pulmonary/Chest: no respiratory distress  Neurology: normal speech    Juan Antonio Hogan, was evaluated through a synchronous (real-time) audio-video encounter. The patient (or guardian if applicable) is aware that this is a billable service, which includes applicable co-pays. This Virtual Visit was conducted with patient's (and/or legal guardian's) consent. The visit was conducted pursuant to the emergency declaration under the [address] [place] [address] authority and the [employer] and [employer] Act. Patient identification was verified, and a caregiver was present when appropriate. The patient was located at home in a state where the provider was licensed to provide care. An electronic signature was used to authenticate this note.   --Matt Villela, APRN - CNP

## 2022-05-09 NOTE — TELEPHONE ENCOUNTER
Spoke with Richa Hodge, caregiver, and she requested patient is in need of a Edwin.      Note to Diane LANG

## 2022-05-17 DIAGNOSIS — F33.1 MODERATE EPISODE OF RECURRENT MAJOR DEPRESSIVE DISORDER (HCC): ICD-10-CM

## 2022-05-17 DIAGNOSIS — F41.9 ANXIETY: ICD-10-CM

## 2022-05-17 RX ORDER — CITALOPRAM 10 MG/1
10 TABLET ORAL DAILY
Qty: 30 TABLET | Refills: 5 | Status: SHIPPED | OUTPATIENT
Start: 2022-05-17 | End: 2022-09-01

## 2022-05-27 ENCOUNTER — HOSPITAL ENCOUNTER (OUTPATIENT)
Dept: WOUND CARE | Age: 32
Discharge: HOME | End: 2022-05-27
Payer: COMMERCIAL

## 2022-05-27 VITALS
DIASTOLIC BLOOD PRESSURE: 68 MMHG | SYSTOLIC BLOOD PRESSURE: 111 MMHG | RESPIRATION RATE: 16 BRPM | TEMPERATURE: 97.5 F | HEART RATE: 85 BPM

## 2022-05-27 DIAGNOSIS — L89.313 STAGE III PRESSURE ULCER OF RIGHT BUTTOCK (HCC): Primary | ICD-10-CM

## 2022-05-27 PROCEDURE — 99213 OFFICE O/P EST LOW 20 MIN: CPT | Performed by: NURSE PRACTITIONER

## 2022-05-27 PROCEDURE — 99213 OFFICE O/P EST LOW 20 MIN: CPT

## 2022-05-27 RX ORDER — BACITRACIN ZINC AND POLYMYXIN B SULFATE 500; 1000 [USP'U]/G; [USP'U]/G
OINTMENT TOPICAL ONCE
Status: CANCELLED | OUTPATIENT
Start: 2022-05-27 | End: 2022-05-27

## 2022-05-27 RX ORDER — LIDOCAINE 40 MG/G
CREAM TOPICAL ONCE
Status: CANCELLED | OUTPATIENT
Start: 2022-05-27 | End: 2022-05-27

## 2022-05-27 RX ORDER — BACITRACIN, NEOMYCIN, POLYMYXIN B 400; 3.5; 5 [USP'U]/G; MG/G; [USP'U]/G
OINTMENT TOPICAL ONCE
Status: CANCELLED | OUTPATIENT
Start: 2022-05-27 | End: 2022-05-27

## 2022-05-27 RX ORDER — BETAMETHASONE DIPROPIONATE 0.05 %
OINTMENT (GRAM) TOPICAL ONCE
Status: CANCELLED | OUTPATIENT
Start: 2022-05-27 | End: 2022-05-27

## 2022-05-27 RX ORDER — LIDOCAINE 50 MG/G
OINTMENT TOPICAL ONCE
Status: CANCELLED | OUTPATIENT
Start: 2022-05-27 | End: 2022-05-27

## 2022-05-27 RX ORDER — CLOBETASOL PROPIONATE 0.5 MG/G
OINTMENT TOPICAL ONCE
Status: CANCELLED | OUTPATIENT
Start: 2022-05-27 | End: 2022-05-27

## 2022-05-27 RX ORDER — LIDOCAINE HYDROCHLORIDE 20 MG/ML
JELLY TOPICAL ONCE
Status: CANCELLED | OUTPATIENT
Start: 2022-05-27 | End: 2022-05-27

## 2022-05-27 RX ORDER — LIDOCAINE HYDROCHLORIDE 40 MG/ML
SOLUTION TOPICAL ONCE
Status: CANCELLED | OUTPATIENT
Start: 2022-05-27 | End: 2022-05-27

## 2022-05-27 RX ORDER — GINSENG 100 MG
CAPSULE ORAL ONCE
Status: CANCELLED | OUTPATIENT
Start: 2022-05-27 | End: 2022-05-27

## 2022-05-27 RX ORDER — GENTAMICIN SULFATE 1 MG/G
OINTMENT TOPICAL ONCE
Status: CANCELLED | OUTPATIENT
Start: 2022-05-27 | End: 2022-05-27

## 2022-05-27 ASSESSMENT — PAIN SCALES - GENERAL: PAINLEVEL_OUTOF10: 0

## 2022-05-27 NOTE — PROGRESS NOTES
Wound Care Center Progress Note       Delmar Frazier  AGE: 33 y.o. GENDER: female  : 1990  TODAY'S DATE:  2022        Subjective:     Chief Complaint   Patient presents with    Wound Check         HISTORY of PRESENT ILLNESS     Delmar Frazier is a 33 y.o. female who presents today for wound evaluation of Chronic pressure ulcer(s) of right buttock/upper thigh. The ulcer is of mild severity. The underlying cause of the wound is pressure. She presents today with a wound that is almost healed! No new issues. Patient is nearly healed. Has not been seen for about a month. There is only 1 small area of abrasion. Requesting more supplies.   Can likely schedule her out a month, and they return for a final check up if needed    Wound Pain Timing/Severity: none  Quality of pain: N/A  Severity of pain:  0 / 10   Modifying Factors: chronic pressure and decreased mobility  Associated Signs/Symptoms: none        PAST MEDICAL HISTORY        Diagnosis Date    Anxiety     Back pain     Depression     Encephalitis     Hypertension     Meningitis     Paraplegia (HCC)     Suicidal ideation     Urinary incontinence        PAST SURGICAL HISTORY    Past Surgical History:   Procedure Laterality Date    PRE-MALIGNANT / BENIGN SKIN LESION EXCISION       left shoulder       FAMILY HISTORY    Family History   Family history unknown: Yes       SOCIAL HISTORY    Social History     Tobacco Use    Smoking status: Never Smoker    Smokeless tobacco: Never Used   Vaping Use    Vaping Use: Never used   Substance Use Topics    Alcohol use: No    Drug use: No       ALLERGIES    Allergies   Allergen Reactions    Fish-Derived Products     Lemon Extract [Flavoring Agent]     Milk-Related Compounds     Trazodone And Nefazodone      Causes nightmares         MEDICATIONS    Current Outpatient Medications on File Prior to Encounter   Medication Sig Dispense Refill    citalopram (CELEXA) 10 MG tablet Take 1 tablet by mouth daily 30 tablet 5    busPIRone (BUSPAR) 5 MG tablet Take 5 mg by mouth 3 times daily      nystatin (MYCOSTATIN) 634193 UNIT/ML suspension Take 5 mLs by mouth 4 times daily Swish and swallow 5 ml, 4 times a day, 8am, 12pm, 4pm, 8pm. 140 mL 0    citalopram (CELEXA) 20 MG tablet Take 1 tablet by mouth daily 30 tablet 0    hydrOXYzine (VISTARIL) 25 MG capsule Take 1 capsule by mouth 2 times daily 60 capsule 3    white petrolatum (VASELINE) GEL Apply 28 g topically as needed for Dry Skin 212 g 3    ibuprofen (ADVIL;MOTRIN) 800 MG tablet Take 1 tablet by mouth every 6 hours as needed for Pain 120 tablet 1    Diapers & Supplies MISC XL diapers. Patient prefers ones that has sticky tabs on sides since she is a paraplegic. Change diaper every 4 hours. 120 each 12     No current facility-administered medications on file prior to encounter. REVIEW OF SYSTEMS    Pertinent items are noted in HPI. Constitutional: Negative for systemic symptoms including fever, chills and malaise. Objective:      /68   Pulse 85   Temp 97.5 °F (36.4 °C) (Temporal)   Resp 16     PHYSICAL EXAM      General: The patient is in no acute distress. Mental status:  Patient is appropriate, is  oriented to place and plan of care. Dermatologic exam: Visual inspection of the periwound reveals the skin to be normal in turgor and texture and dry.   Wound exam:  see wound description below     All active wounds listed below with today's date are evaluated      Wound 02/11/19 WOUND #1 RIGHT POSTERIOR THIGH pressure 1 (Active)   Number of days: 1201       Wound 05/21/21 Buttocks Right Right Ischium  (Active)   Number of days: 371       Wound 02/11/22 #1 Right Buttock (Active)   Wound Image   05/27/22 1317   Wound Etiology Pressure Stage 3 05/27/22 1317   Dressing Status New dressing applied 04/29/22 1335   Wound Cleansed Wound cleanser 05/27/22 1317   Dressing/Treatment Alginate;Silicone border 35/26/22 1332 Offloading for Diabetic Foot Ulcers Other (comment) 05/27/22 1317   Wound Length (cm) 0.1 cm 05/27/22 1317   Wound Width (cm) 0.1 cm 05/27/22 1317   Wound Depth (cm) 0.1 cm 05/27/22 1317   Wound Surface Area (cm^2) 0.01 cm^2 05/27/22 1317   Change in Wound Size % (l*w) 99.9 05/27/22 1317   Wound Volume (cm^3) 0.001 cm^3 05/27/22 1317   Wound Healing % 100 05/27/22 1317   Post-Procedure Length (cm) 1.5 cm 04/22/22 1327   Post-Procedure Width (cm) 0.7 cm 04/22/22 1327   Post-Procedure Depth (cm) 0.3 cm 04/22/22 1327   Post-Procedure Surface Area (cm^2) 1.05 cm^2 04/22/22 1327   Post-Procedure Volume (cm^3) 0.315 cm^3 04/22/22 1327   Distance Tunneling (cm) 0 cm 05/27/22 1317   Tunneling Position ___ O'Clock 0 05/27/22 1317   Undermining Starts ___ O'Clock 0 05/27/22 1317   Undermining Ends___ O'Clock 0 05/27/22 1317   Undermining Maxium Distance (cm) 0 05/27/22 1317   Wound Assessment Granulation tissue; Epithelialization 05/27/22 1317   Drainage Amount Moderate 05/27/22 1317   Drainage Description Serosanguinous 05/27/22 1317   Odor None 05/27/22 1317   Dyan-wound Assessment Blanchable erythema 05/27/22 1317   Margins Defined edges; Undefined edges 05/27/22 1317   Wound Thickness Description not for Pressure Injury Full thickness 05/27/22 1317   Number of days: 104       Assessment:       Problem List Items Addressed This Visit        Other    WD-Stage III pressure ulcer of right buttock ([facility] [address] - Primary    Relevant Orders    Initiate Outpatient Wound Care Protocol          Status of wound progress and description from last visit:   Nearly healed. Ordering more supplies. Follow up 4 weeks and hope to discharge at that time         Plan:     Discharge Instructions         Discharge Instructions        [address]     NOTE: Upon discharge from the [address], you will receive a patient experience survey.  We would be grateful if you would take the time to fill this survey out.     Wound care order history:                 KEVIN's   Right       Left               Date:              Cultures:  Buttock wound cultured 5/21/21                                Obtained on 2/11/2022                                Cultured on 3/11/2022              Grafts:                Antibiotics:           Continuing wound care orders and information:                 Residence:                Continue home health care with: Brayan Bravo, supplies  Aide only not nursing  Fax #[phone]                [facility] [address] [place] [address] guardian  [phone]              Your wound-care supplies will be provided by:      Wound cleansing:               ER not scrub or use excessive force.              Wash hands with soap and water before and after dressing changes.             WUBCT to applying a clean dressing, cleanse wound with normal saline, wound cleanser, or mild soap and water.               Ask the physician or nurse before getting the wound(s) wet in a shower     Daily Wound management:              Keep weight off wounds and reposition every 2 hours.              Avoid standing for long periods of time.              Apply wraps/stockings in AM and remove at bedtime.              If swelling is present, elevate legs to the level of the heart or above for 30 minutes 4-5 times a day and/or when sitting.                                      When taking antibiotics take entire prescription as ordered by physician do not stop taking until medicine is all gone.                                                           Orders for this week: 5/27/2022     Buttock wound cultured 5/21/21,2/11/2022     Rx: [facility] in [address] [place] [address] to periwound     Right Buttock - wash with soap and water, pat dry. Apply Hydrofera blue ready border.  (Clobetasol to ginger care today in clinic only) Change daily and as needed.     Pressure reduction will facilitate healing, turn and reposition at least every 2 hours.        Follow up with Dr Kodak Godoy 4 weeks in the wound care center. Call [phone] for any questions or concerns.   Date__________   Time___________             Treatment Note      Written Patient Dismissal Instructions Given            Electronically signed by PRECIOUS Lopez CNP on 5/27/2022 at 1:31 PM

## 2022-07-19 ENCOUNTER — TELEPHONE (OUTPATIENT)
Dept: WOUND CARE | Age: 32
End: 2022-07-19

## 2022-07-19 DIAGNOSIS — L89.313 STAGE III PRESSURE ULCER OF RIGHT BUTTOCK (HCC): Primary | ICD-10-CM

## 2022-07-28 DIAGNOSIS — F41.9 ANXIETY: ICD-10-CM

## 2022-07-28 RX ORDER — HYDROXYZINE PAMOATE 25 MG/1
25 CAPSULE ORAL 2 TIMES DAILY
Qty: 60 CAPSULE | Refills: 3 | OUTPATIENT
Start: 2022-07-28

## 2022-08-01 ENCOUNTER — HOSPITAL ENCOUNTER (OUTPATIENT)
Dept: WOUND CARE | Age: 32
Discharge: HOME | End: 2022-08-01
Payer: COMMERCIAL

## 2022-08-01 VITALS
TEMPERATURE: 97.2 F | RESPIRATION RATE: 16 BRPM | SYSTOLIC BLOOD PRESSURE: 107 MMHG | HEART RATE: 93 BPM | DIASTOLIC BLOOD PRESSURE: 73 MMHG

## 2022-08-01 DIAGNOSIS — G82.20 PARAPLEGIA (HCC): Primary | ICD-10-CM

## 2022-08-01 DIAGNOSIS — L89.312 DECUBITUS ULCER OF RIGHT BUTTOCK, STAGE 2 (HCC): ICD-10-CM

## 2022-08-01 PROBLEM — L89.313 STAGE III PRESSURE ULCER OF RIGHT BUTTOCK (HCC): Status: RESOLVED | Noted: 2021-05-21 | Resolved: 2022-08-01

## 2022-08-01 PROCEDURE — 99214 OFFICE O/P EST MOD 30 MIN: CPT

## 2022-08-01 PROCEDURE — 11042 DBRDMT SUBQ TIS 1ST 20SQCM/<: CPT

## 2022-08-01 PROCEDURE — 99213 OFFICE O/P EST LOW 20 MIN: CPT | Performed by: NURSE PRACTITIONER

## 2022-08-01 PROCEDURE — 11042 DBRDMT SUBQ TIS 1ST 20SQCM/<: CPT | Performed by: NURSE PRACTITIONER

## 2022-08-01 RX ORDER — CLOBETASOL PROPIONATE 0.5 MG/G
OINTMENT TOPICAL ONCE
Status: CANCELLED | OUTPATIENT
Start: 2022-08-01 | End: 2022-08-01

## 2022-08-01 RX ORDER — BETAMETHASONE DIPROPIONATE 0.05 %
OINTMENT (GRAM) TOPICAL ONCE
Status: CANCELLED | OUTPATIENT
Start: 2022-08-01 | End: 2022-08-01

## 2022-08-01 RX ORDER — BACITRACIN, NEOMYCIN, POLYMYXIN B 400; 3.5; 5 [USP'U]/G; MG/G; [USP'U]/G
OINTMENT TOPICAL ONCE
Status: CANCELLED | OUTPATIENT
Start: 2022-08-01 | End: 2022-08-01

## 2022-08-01 RX ORDER — LIDOCAINE 40 MG/G
CREAM TOPICAL ONCE
Status: CANCELLED | OUTPATIENT
Start: 2022-08-01 | End: 2022-08-01

## 2022-08-01 RX ORDER — GENTAMICIN SULFATE 1 MG/G
OINTMENT TOPICAL ONCE
Status: CANCELLED | OUTPATIENT
Start: 2022-08-01 | End: 2022-08-01

## 2022-08-01 RX ORDER — BACITRACIN ZINC AND POLYMYXIN B SULFATE 500; 1000 [USP'U]/G; [USP'U]/G
OINTMENT TOPICAL ONCE
Status: CANCELLED | OUTPATIENT
Start: 2022-08-01 | End: 2022-08-01

## 2022-08-01 RX ORDER — LIDOCAINE HYDROCHLORIDE 20 MG/ML
JELLY TOPICAL ONCE
Status: CANCELLED | OUTPATIENT
Start: 2022-08-01 | End: 2022-08-01

## 2022-08-01 RX ORDER — GINSENG 100 MG
CAPSULE ORAL ONCE
Status: CANCELLED | OUTPATIENT
Start: 2022-08-01 | End: 2022-08-01

## 2022-08-01 RX ORDER — LIDOCAINE 50 MG/G
OINTMENT TOPICAL ONCE
Status: CANCELLED | OUTPATIENT
Start: 2022-08-01 | End: 2022-08-01

## 2022-08-01 RX ORDER — LIDOCAINE HYDROCHLORIDE 40 MG/ML
SOLUTION TOPICAL ONCE
Status: CANCELLED | OUTPATIENT
Start: 2022-08-01 | End: 2022-08-01

## 2022-08-01 NOTE — PROGRESS NOTES
[address] [facility] Initial Visit      Gloria Winter  AGE: 27 y.o. GENDER: female  : 1990  EPISODE DATE:  2022   Referred by: Self    Subjective:     CHIEF COMPLAINT WOUND RIGHT BUTTOCK/THIGH     HISTORY of PRESENT ILLNESS      Gloria Winter is a 27 y.o. female who presents to the [address] [place] [address] for an initial visit for evaluation and treatment of Acute on chronic pressure and moisture   ulcer of  right buttock/thigh. The condition is of moderate severity. The ulcer has been present for approximately 2  weeks at the wound clinic visit 22. The underlying cause is thought to be pressure and moisture. The patients care to date has included nothing. The patient has significant underlying medical conditions as below. Wound Pain Timing/Severity: intermittent, mild  Quality of pain: burning, tender  Severity of pain:  2 / 10   Modifying Factors: chronic pressure, decreased mobility, shear force, and obesity  Associated Signs/Symptoms: drainage and pain    KEVIN: as indicated base on wound location and assessment    Wound infection: wound culture will be obtained as needed for symptoms of infection. No local or system S&S infection    Arterial evaluation: if indicated based on wound, location, symptoms and healing    Venous Evaluation: if indicated based on wound, location, symptoms and healing    Diabetes: No  Smoking: Never smoker  Anticoagulant/Antiplatelet therapy: No  Immunosuppression: No  Obesity: Yes  Other History: Paraplegia    Patient educated on the 6 essential components necessary for wound healing: Circulation, Debridements, Proper Dressings and Topical Wound Products, Infection Control, Edema Control and Offloading.      Patient educated on those factors that negatively effect or impact wound healing: smoking, obesity, uncontrolled diabetes, anticoagulant and immunosuppressive regimens, inadequate nutrition, untreated arterial and venous disease if applicable and measures to manage edema. Nutritional status: well nourished. Discussed need for increased protein and calories for wound healing and good sources of protein (just over 7 grams for every 20 pounds of body weight). Animal-based foods high in protein (meat, poultry, fish, eggs, and dairy foods). Plant based foods high in protein (tofu, lentils, beans, chickpeas, nuts, quinoa and karl seeds. Off Loading  Offloading or minimizing or removing weight placed on an area with poor circulation such as diabetic wounds or pressure. This can be achieved with crutches, wheel chair, knee walker etc. Minimizing pressure through partial weight bearing (minimizing the amount of  pressure applied and or the amount of time on the area of pressure) or maintaining a non-weight bearing status can be used to promote and often can be essential for thee wound to heal. Off loading may also need to be achieved for non-weight bearing wounds such as pressure ulcers to the torso. Turning and changing positions frequently, at least every two hours. Use of pressure cushion if sitting up in chair. Skin Care  Keep skin clean and well moisturized , moisturize routinely with ointments for heavier moisturizer needs for extremely dry skin or cracks such as A&D ointment and lotions for a light moisturizer such as CeraVe or Eucerin. If incontinent change incontinence garments as soon as soiled and keeping skin clean and use barrier cream to protect the skin. Reduce Salt and Sodium  Choose low- or reduced- sodium, or no-salt-added versions of foods and condiments when available. Buy fresh, plain frozen, or canned with no-added-salt vegetables. Use fresh poultry, fish and lean meat, rather than canned, smoked or processed types. Choose ready-to-eat breakfast cereals that are lower in sodium. Limit cured foods (such as barnes and ham), foods packed in brine (such as pickled foods) and condiments (such as MSG, mustard, horseradish, and catsup).  Limit even Beverages to be calorie free except for milk. Every other beverage should be water, avoid soda. Continue to increase level of physical activity. Refer to weight management as indicated and requested by patient. PAST MEDICAL HISTORY        Diagnosis Date    Anxiety     Back pain     Depression     Encephalitis     Hypertension     Meningitis     Paraplegia (HCC)     Suicidal ideation     Urinary incontinence        PAST SURGICAL HISTORY    Past Surgical History:   Procedure Laterality Date    PRE-MALIGNANT / BENIGN SKIN LESION EXCISION      2015 left shoulder       FAMILY HISTORY    Family History   Family history unknown: Yes       SOCIAL HISTORY    Social History     Tobacco Use    Smoking status: Never    Smokeless tobacco: Never   Vaping Use    Vaping Use: Never used   Substance Use Topics    Alcohol use: No    Drug use: No       ALLERGIES    Allergies   Allergen Reactions    Fish-Derived Products     Lemon Extract [Flavoring Agent]     Milk-Related Compounds     Trazodone And Nefazodone      Causes nightmares         MEDICATIONS    Current Outpatient Medications on File Prior to Encounter   Medication Sig Dispense Refill    citalopram (CELEXA) 10 MG tablet Take 1 tablet by mouth daily 30 tablet 5    busPIRone (BUSPAR) 5 MG tablet Take 5 mg by mouth 3 times daily      nystatin (MYCOSTATIN) 351424 UNIT/ML suspension Take 5 mLs by mouth 4 times daily Swish and swallow 5 ml, 4 times a day, 8am, 12pm, 4pm, 8pm. 140 mL 0    citalopram (CELEXA) 20 MG tablet Take 1 tablet by mouth daily 30 tablet 0    hydrOXYzine (VISTARIL) 25 MG capsule Take 1 capsule by mouth 2 times daily 60 capsule 3    white petrolatum (VASELINE) GEL Apply 28 g topically as needed for Dry Skin 212 g 3    ibuprofen (ADVIL;MOTRIN) 800 MG tablet Take 1 tablet by mouth every 6 hours as needed for Pain 120 tablet 1    Diapers & Supplies MISC XL diapers. Patient prefers ones that has sticky tabs on sides since she is a paraplegic. Change diaper every 4 hours. 120 each 12     No current facility-administered medications on file prior to encounter. PROBLEM LIST    Patient Active Problem List   Diagnosis    Nevus    Major depressive disorder    Hallucinations    WD-Paraplegia (HCC)    Altered mental status    Other insomnia    Anxiety    Acute gastroenteritis    WD-Decubitus ulcer of right buttock, stage 2 (HCC)       REVIEW OF SYSTEMS    Constitutional: negative for anorexia, chills, fatigue, fevers, malaise, sweats, and weight loss  Respiratory: negative for cough, dyspnea on exertion, hemoptysis, pleurisy/chest pain, shortness of breath, sputum, stridor, and wheezing  Cardiovascular: negative for chest pain, chest pressure/discomfort, dyspnea, exertional chest pressure/discomfort, irregular heart beat, near-syncope, orthopnea, palpitations, paroxysmal nocturnal dyspnea, syncope, and tachypnea  Integument/breast: positive for skin lesion(s)      Objective:      /73   Pulse 93   Temp 97.2 °F (36.2 °C) (Temporal)   Resp 16     PHYSICAL EXAM  General Appearance: alert and oriented to person, place and time, well-developed and well-nourished, in no acute distress  Pulmonary/Chest: clear to auscultation bilaterally- no wheezes, rales or rhonchi, normal air movement, no respiratory distress  Cardiovascular: normal rate, normal S1 and S2, no gallops, and intact distal pulses  Dermatologic exam: Visual inspection of the periwound reveals the skin to be moist  Wound exam: see wound description below in procedure note      Assessment:       Abhay Back  appears to have a non-healing wound of the right buttock/thigh. The etiology of the wound is felt to be pressure and moisture . There are multiple complicating factors including chronic pressure, decreased mobility, shear force, and obesity. A comprehensive wound management program would be helpful to heal this wound.  Assessments completed include fall risk and nutritional, functional,and psychological status. At this time appropriate care would include: periodic debridement and wound care as below. Problem List Items Addressed This Visit          Other    WD-Decubitus ulcer of right buttock, stage 2 ([facility] [address]    WD-Paraplegia ([facility] [address] - Primary       Procedure Note    Indications:  Based on my examination of this patient's wound(s) today, sharp excision into necrotic subcutaneous tissue is required to promote healing and evaluate the extent of previous healing. Performed by: PRECIOUS Cantu - CNP    Consent obtained: Yes    Time out taken:  Yes    Pain Control: Anesthetic  Anesthetic: 4% Lidocaine Liquid Topical        Debridement:Excisional Debridement    Using curette the wound(s) was/were sharply debrided down through and including the removal of subcutaneous tissue.         Devitalized Tissue Debrided:  fibrin, biofilm, slough, and necrotic/eschar    Pre Debridement Measurements:  Are located in the Wound Documentation Flow Sheet    All active wounds listed below with today's date are evaluated  Wound(s)    debrided this date include # : 1     Post  Debridement Measurements:  Wound 08/01/22 #1 Right Buttock Cluster (Active)   Wound Image   08/01/22 1259   Dressing Status Clean;Dry;New dressing applied 08/01/22 1356   Wound Cleansed Wound cleanser 08/01/22 1259   Wound Length (cm) 10 cm 08/01/22 1259   Wound Width (cm) 9 cm 08/01/22 1259   Wound Depth (cm) 0.1 cm 08/01/22 1259   Wound Surface Area (cm^2) 90 cm^2 08/01/22 1259   Wound Volume (cm^3) 9 cm^3 08/01/22 1259   Post-Procedure Length (cm) 10 cm 08/01/22 1319   Post-Procedure Width (cm) 9 cm 08/01/22 1319   Post-Procedure Depth (cm) 0.1 cm 08/01/22 1319   Post-Procedure Surface Area (cm^2) 90 cm^2 08/01/22 1319   Post-Procedure Volume (cm^3) 9 cm^3 08/01/22 1319   Distance Tunneling (cm) 0 cm 08/01/22 1259   Tunneling Position ___ O'Clock 0 08/01/22 1259   Undermining Starts ___ O'Clock 0 08/01/22 1259   Undermining Ends___ O'Clock 0 08/01/22 1259   Undermining Maxium Distance (cm) 0 08/01/22 1259   Wound Assessment Pink/red 08/01/22 1259   Drainage Amount Moderate 08/01/22 1259   Drainage Description Serosanguinous 08/01/22 1259   Odor None 08/01/22 1259   Dyan-wound Assessment Fragile 08/01/22 1259   Margins Undefined edges 08/01/22 1259   Wound Thickness Description not for Pressure Injury Full thickness 08/01/22 1259   Number of days: 3     Total  Area  Debrided:  20 sq cm     Bleeding:  Minimal    Hemostasis Achieved:  by pressure    Procedural Pain:  0  / 10     Post Procedural Pain:  0 / 10     Response to treatment:  Well tolerated by patient. Status of wound: Recurrent pressure ulcer, will resume previous regimen that healed her last time. She will return to Dr. Laila Zaidi clinic. Reinforced off loading and skin care as above. Plan:     Discharge instructions:    Discharge Instructions         PHYSICIAN ORDERS AND DISCHARGE INSTRUCTIONS     NOTE: Upon discharge from the [address], you will receive a patient experience survey. We would be grateful if you would take the time to fill this survey out. Wound care order history:                 KEVIN's   Right       Left               Date:              Cultures:  Buttock wound cultured 5/21/21                                Obtained on 2/11/2022                                Cultured on 3/11/2022              Grafts:                Antibiotics:           Continuing wound care orders and information:                 Residence:                Continue home health care with:  Nereida Cash, supplies  Aide only not nursing  Fax [phone] [facility] Legal guardian [phone]                            Your wound-care supplies will be provided by: Wound cleansing:              Do not scrub or use excessive force. Wash hands with soap and water before and after dressing changes.               Prior to applying a clean dressing, cleanse wound

## 2022-08-01 NOTE — DISCHARGE INSTRUCTIONS
PHYSICIAN ORDERS AND DISCHARGE INSTRUCTIONS     NOTE: Upon discharge from the [address], you will receive a patient experience survey. We would be grateful if you would take the time to fill this survey out. Wound care order history:                 KEVIN's   Right       Left               Date:              Cultures:  Buttock wound cultured 5/21/21                                Obtained on 2/11/2022                                Cultured on 3/11/2022              Grafts:                Antibiotics:           Continuing wound care orders and information:                 Residence:                Continue home health care with:  Nieves Del Rosario, supplies  Aide only not nursing  Fax [phone] [facility] Legal guardian [phone]                            Your wound-care supplies will be provided by: Wound cleansing:              Do not scrub or use excessive force. Wash hands with soap and water before and after dressing changes. Prior to applying a clean dressing, cleanse wound with normal saline, wound cleanser, or mild soap and water. Ask the physician or nurse before getting the wound(s) wet in a shower     Daily Wound management:              Keep weight off wounds and reposition every 2 hours. Avoid standing for long periods of time. Apply wraps/stockings in AM and remove at bedtime. If swelling is present, elevate legs to the level of the heart or above for 30 minutes 4-5 times a day and/or when sitting. When taking antibiotics take entire prescription as ordered by physician do not stop taking until medicine is all gone. Wound Care Notes:   Pressure reduction will facilitate healing, turn and reposition at least every 2 hours.   Buttock wound cultured 5/21/21,2/11/2022  Rx: Layo Segura in [place] Orders for this week: 08/01/2022    Home Care referral for skilled nursing wound care 8/1/22      Desitin to periwound   Right Buttock - wash with soap and water, pat dry. Apply stimulen powder and saline damp Hydrofera blue to wound  Cover with silicone border gauze border. (Clobetasol to ginger care today in clinic only)   Change daily and as needed. Follow up with Dr Olga Lanes in 2 weeks in the wound care center. Call [phone] for any questions or concerns.

## 2022-08-05 PROBLEM — L89.311: Status: RESOLVED | Noted: 2019-02-11 | Resolved: 2022-08-05

## 2022-08-05 PROBLEM — L98.411 NON-PRESSURE CHRONIC ULCER OF BUTTOCK, LIMITED TO BREAKDOWN OF SKIN (HCC): Status: RESOLVED | Noted: 2019-02-11 | Resolved: 2022-08-05

## 2022-08-25 ENCOUNTER — TELEPHONE (OUTPATIENT)
Dept: WOUND CARE | Age: 32
End: 2022-08-25

## 2022-08-25 DIAGNOSIS — F41.9 ANXIETY DISORDER, UNSPECIFIED: ICD-10-CM

## 2022-08-25 DIAGNOSIS — F33.1 MAJOR DEPRESSIVE DISORDER, RECURRENT, MODERATE (HCC): ICD-10-CM

## 2022-08-25 RX ORDER — BUSPIRONE HYDROCHLORIDE 5 MG/1
TABLET ORAL
Qty: 90 TABLET | Refills: 3 | OUTPATIENT
Start: 2022-08-25

## 2022-08-25 NOTE — TELEPHONE ENCOUNTER
Quentin Drake at [facility] called to state that they had originally admitted client for services, but due to compliance issues and many (5+) refusals for care after staff drove to client's house, they will be discharging client from home care services.     Electronically signed by Anupama Dubon RN on 8/25/2022 at 2:41 PM

## 2022-09-01 ENCOUNTER — HOSPITAL ENCOUNTER (OUTPATIENT)
Dept: WOUND CARE | Age: 32
Discharge: HOME | End: 2022-09-01
Payer: COMMERCIAL

## 2022-09-01 VITALS
SYSTOLIC BLOOD PRESSURE: 100 MMHG | TEMPERATURE: 97.3 F | HEART RATE: 69 BPM | RESPIRATION RATE: 16 BRPM | DIASTOLIC BLOOD PRESSURE: 65 MMHG

## 2022-09-01 DIAGNOSIS — L89.312 DECUBITUS ULCER OF RIGHT BUTTOCK, STAGE 2 (HCC): Primary | ICD-10-CM

## 2022-09-01 DIAGNOSIS — G82.20 PARAPLEGIA (HCC): ICD-10-CM

## 2022-09-01 PROCEDURE — 11042 DBRDMT SUBQ TIS 1ST 20SQCM/<: CPT

## 2022-09-01 PROCEDURE — 11042 DBRDMT SUBQ TIS 1ST 20SQCM/<: CPT | Performed by: NURSE PRACTITIONER

## 2022-09-01 RX ORDER — LIDOCAINE 40 MG/G
CREAM TOPICAL ONCE
Status: CANCELLED | OUTPATIENT
Start: 2022-09-01 | End: 2022-09-01

## 2022-09-01 RX ORDER — GENTAMICIN SULFATE 1 MG/G
OINTMENT TOPICAL ONCE
Status: CANCELLED | OUTPATIENT
Start: 2022-09-01 | End: 2022-09-01

## 2022-09-01 RX ORDER — LIDOCAINE 50 MG/G
OINTMENT TOPICAL ONCE
Status: CANCELLED | OUTPATIENT
Start: 2022-09-01 | End: 2022-09-01

## 2022-09-01 RX ORDER — LIDOCAINE HYDROCHLORIDE 40 MG/ML
SOLUTION TOPICAL ONCE
Status: CANCELLED | OUTPATIENT
Start: 2022-09-01 | End: 2022-09-01

## 2022-09-01 RX ORDER — GINSENG 100 MG
CAPSULE ORAL ONCE
Status: CANCELLED | OUTPATIENT
Start: 2022-09-01 | End: 2022-09-01

## 2022-09-01 RX ORDER — BETAMETHASONE DIPROPIONATE 0.05 %
OINTMENT (GRAM) TOPICAL ONCE
Status: CANCELLED | OUTPATIENT
Start: 2022-09-01 | End: 2022-09-01

## 2022-09-01 RX ORDER — BACITRACIN ZINC AND POLYMYXIN B SULFATE 500; 1000 [USP'U]/G; [USP'U]/G
OINTMENT TOPICAL ONCE
Status: CANCELLED | OUTPATIENT
Start: 2022-09-01 | End: 2022-09-01

## 2022-09-01 RX ORDER — CLOBETASOL PROPIONATE 0.5 MG/G
OINTMENT TOPICAL ONCE
Status: CANCELLED | OUTPATIENT
Start: 2022-09-01 | End: 2022-09-01

## 2022-09-01 RX ORDER — BACITRACIN, NEOMYCIN, POLYMYXIN B 400; 3.5; 5 [USP'U]/G; MG/G; [USP'U]/G
OINTMENT TOPICAL ONCE
Status: CANCELLED | OUTPATIENT
Start: 2022-09-01 | End: 2022-09-01

## 2022-09-01 NOTE — PROGRESS NOTES
Wound Care Center Progress Visit      Arnie Tejeda  AGE: 27 y.o. GENDER: female  : 1990  EPISODE DATE:  2022   Referred by: Self    Subjective:     CHIEF COMPLAINT WOUND RIGHT BUTTOCK/THIGH     HISTORY of PRESENT ILLNESS      Arnie Tejeda is a 27 y.o. female who presents to the [address] [place] [address] for an initial visit for evaluation and treatment of Acute on chronic pressure and moisture   ulcer of  right buttock/thigh. The condition is of moderate severity. The ulcer has been present for approximately 2  weeks at the wound clinic visit 22. The underlying cause is thought to be pressure and moisture. The patients care to date has included nothing. The patient has significant underlying medical conditions as below. Wound Pain Timing/Severity: intermittent, mild  Quality of pain: burning, tender  Severity of pain:  2 / 10   Modifying Factors: chronic pressure, decreased mobility, shear force, and obesity  Associated Signs/Symptoms: drainage and pain    KEVIN: as indicated base on wound location and assessment    Wound infection: wound culture will be obtained as needed for symptoms of infection. No local or system S&S infection    Arterial evaluation: if indicated based on wound, location, symptoms and healing    Venous Evaluation: if indicated based on wound, location, symptoms and healing    Diabetes: No  Smoking: Never smoker  Anticoagulant/Antiplatelet therapy: No  Immunosuppression: No  Obesity: Yes  Other History: Paraplegia    Patient educated on the 6 essential components necessary for wound healing: Circulation, Debridements, Proper Dressings and Topical Wound Products, Infection Control, Edema Control and Offloading.      Patient educated on those factors that negatively effect or impact wound healing: smoking, obesity, uncontrolled diabetes, anticoagulant and immunosuppressive regimens, inadequate nutrition, untreated arterial and venous disease if applicable and measures to lower sodium versions of soy sauce and teriyaki sauce-treat these condiments just like salt). In cooking and at the table, flavor foods with herbs, spices, lemon, lime, vinegar or salt-free seasoning blends. Start by cutting salt in half. Cook rice, pasta and hot cereals without salt. Cut back on instant or flavored rice, pasta and cereal mixes, which usually have added salt. Choose convenience foods that are lower in sodium. Limit frozen dinners, packaged mixes, canned soups and dressings. Rinse canned foods, such as tuna, to remove some sodium. Choose fruits or vegetables instead of salty snack foods. Edema Management if applicable  Whenever resting, raise your legs up. Try to keep the swollen area higher than the level of your heart. Take breaks from standing or sitting in one position. Walk around to increase the blood flow in your lower legs. Move your feet and ankles often while you stand, or tighten and relax your leg muscles. Wear support stockings. Put them on in the morning, before swelling gets worse. Eat a balanced diet. Lose weight if you need to. Limit the amount of salt (sodium) in your diet. Salt holds fluid in the body and may increase swelling. Apply compression stocking(s) every morning as soon as you get up. Remove at bedtime unless instructed to wear day and night. Hand wash and line dry to prevent loss of elasticity. Replace every 3-4 months to ensure proper fit. Weight Management if Applicable  Will need to ultimately change overall eating behaviors to have success with weight loss. Encouraged to weigh daily and work towards a goal of 1-2 pounds of weight loss weekly. Encouraged to journal all food intake, Etherpad pal is a useful tool to help keep track of food intake and caloric value. Keep calorie level at approximately 1619-7428. Protein intake is to be a minimum of 60 grams per day (unless otherwise directed). Water drinking was encouraged with a goal of 64oz-128oz daily. file prior to encounter. PROBLEM LIST    Patient Active Problem List   Diagnosis    Nevus    Major depressive disorder    Hallucinations    WD-Paraplegia (HCC)    Altered mental status    Other insomnia    Anxiety    Acute gastroenteritis    WD-Decubitus ulcer of right buttock, stage 2 (HCC)       REVIEW OF SYSTEMS    Constitutional: negative for anorexia, chills, fatigue, fevers, malaise, sweats, and weight loss  Respiratory: negative for cough, dyspnea on exertion, hemoptysis, pleurisy/chest pain, shortness of breath, sputum, stridor, and wheezing  Cardiovascular: negative for chest pain, chest pressure/discomfort, dyspnea, exertional chest pressure/discomfort, irregular heart beat, near-syncope, orthopnea, palpitations, paroxysmal nocturnal dyspnea, syncope, and tachypnea  Integument/breast: positive for skin lesion(s)      Objective:      /65   Pulse 69   Temp 97.3 °F (36.3 °C) (Temporal)   Resp 16     PHYSICAL EXAM  General Appearance: alert and oriented to person, place and time, well-developed and well-nourished, in no acute distress  Pulmonary/Chest: clear to auscultation bilaterally- no wheezes, rales or rhonchi, normal air movement, no respiratory distress  Cardiovascular: normal rate, normal S1 and S2, no gallops, and intact distal pulses  Dermatologic exam: Visual inspection of the periwound reveals the skin to be moist  Wound exam: see wound description below in procedure note      Assessment:       Briana Taylor  appears to have a non-healing wound of the right buttock/thigh. The etiology of the wound is felt to be pressure and moisture . There are multiple complicating factors including chronic pressure, decreased mobility, shear force, and obesity. A comprehensive wound management program would be helpful to heal this wound. Assessments completed include fall risk and nutritional, functional,and psychological status.   At this time appropriate care would include: periodic debridement and wound care as below. Problem List Items Addressed This Visit          Other    WD-Decubitus ulcer of right buttock, stage 2 ([facility] [address] - Primary    Relevant Orders    Initiate Outpatient Wound Care Protocol    WD-Paraplegia [facility])    Relevant Orders    Initiate Outpatient Wound Care Protocol       Procedure Note    Indications:  Based on my examination of this patient's wound(s) today, sharp excision into necrotic subcutaneous tissue is required to promote healing and evaluate the extent of previous healing. Performed by: PRECIOUS Meredith - CNP    Consent obtained: Yes    Time out taken:  Yes    Pain Control: Anesthetic  Anesthetic: 4% Lidocaine Liquid Topical        Debridement:Excisional Debridement    Using curette the wound(s) was/were sharply debrided down through and including the removal of subcutaneous tissue.         Devitalized Tissue Debrided:  fibrin, biofilm, slough, and necrotic/eschar    Pre Debridement Measurements:  Are located in the Wound Documentation Flow Sheet    All active wounds listed below with today's date are evaluated  Wound(s)    debrided this date include # : 1     Post  Debridement Measurements:  Wound 08/01/22 #1 Right Buttock Cluster (Active)   Wound Image   09/01/22 0940   Dressing Status Clean;Dry;New dressing applied 08/01/22 1356   Wound Cleansed Wound cleanser 09/01/22 0940   Wound Length (cm) 4 cm 09/01/22 0940   Wound Width (cm) 5.2 cm 09/01/22 0940   Wound Depth (cm) 0.1 cm 09/01/22 0940   Wound Surface Area (cm^2) 20.8 cm^2 09/01/22 0940   Change in Wound Size % (l*w) 76.89 09/01/22 0940   Wound Volume (cm^3) 2.08 cm^3 09/01/22 0940   Wound Healing % 77 09/01/22 0940   Post-Procedure Length (cm) 4 cm 09/01/22 1020   Post-Procedure Width (cm) 5.2 cm 09/01/22 1020   Post-Procedure Depth (cm) 0.1 cm 09/01/22 1020   Post-Procedure Surface Area (cm^2) 20.8 cm^2 09/01/22 1020   Post-Procedure Volume (cm^3) 2.08 cm^3 09/01/22 1020   Distance Tunneling (cm) 0 cm 09/01/22 0940   Tunneling Position ___ O'Clock 0 09/01/22 0940   Undermining Starts ___ O'Clock 0 09/01/22 0940   Undermining Ends___ O'Clock 0 09/01/22 0940   Undermining Maxium Distance (cm) 0 09/01/22 0940   Wound Assessment Pink/red;Granulation tissue 09/01/22 0940   Drainage Amount Moderate 09/01/22 0940   Drainage Description Serosanguinous 09/01/22 0940   Odor None 09/01/22 0940   Dyan-wound Assessment Fragile; Intact 09/01/22 0940   Margins Undefined edges 09/01/22 0940   Wound Thickness Description not for Pressure Injury Full thickness 09/01/22 0940   Number of days: 31      Total  Area  Debrided:  20 sq cm     Bleeding:  Minimal    Hemostasis Achieved:  by pressure    Procedural Pain:  0  / 10     Post Procedural Pain:  0 / 10     Response to treatment:  Well tolerated by patient. Status of wound: Recurrent pressure ulcer, will resume previous regimen that healed her last time. She in the past has been followed by Dr. Jayde Arriaza but has been coming to this clinic for the last few visit. The patient lives at [facility], she has assistance from supporting independence and has a caregiver Anca Villa [phone]) with her today that cares for her Monday-Thursday (9am-5pm). She has had Home Health services through [facility] that recently discharged her because she was not letting them change the dressing after multiple visit. The CG as above is not allowed to perform advanced dressing changes. Giovany Kirk from [address] [place] [address] the patient frequently has saturated incontinence garments when she had been there. The spoke with Jefferson Muñoz ([phone])  with supporting independence, we discussed above and she verbalizes the patient often does refuse incontinence garment changes and they are not allowed to force her to get changed. She verbalizes [facility] [phone] [address] was who should be contacted to discuss the case with.  She as well verbalizes they can't force the patient to get routine wound or incontinence care. A message was left with legal guardian Antonina Bazan to call me about the patient. Reinforced off loading and skin care as above with Pat her CG. Plan:     Discharge instructions:    Discharge Instructions         PHYSICIAN ORDERS AND DISCHARGE INSTRUCTIONS     NOTE: Upon discharge from the [address], you will receive a patient experience survey. We would be grateful if you would take the time to fill this survey out. Wound care order history:                 KEVIN's   Right       Left               Date:              Cultures:  Buttock wound cultured 5/21/21                                Obtained on 2/11/2022                                Cultured on 3/11/2022              Grafts:                Antibiotics:           Continuing wound care orders and information:                 Residence:                Continue home health care with:  Sydelle Paget, supplies  Aide only not nursing  Fax [phone] [facility] Legal guardian [phone]                            Your wound-care supplies will be provided by: Wound cleansing:              Do not scrub or use excessive force. Wash hands with soap and water before and after dressing changes. Prior to applying a clean dressing, cleanse wound with normal saline, wound cleanser, or mild soap and water. Ask the physician or nurse before getting the wound(s) wet in a shower     Daily Wound management:              Keep weight off wounds and reposition every 2 hours. Avoid standing for long periods of time. Apply wraps/stockings in AM and remove at bedtime. If swelling is present, elevate legs to the level of the heart or above for 30 minutes 4-5 times a day and/or when sitting.                                       When taking antibiotics take entire prescription as ordered by physician do not stop taking until medicine is all gone. Wound Care Notes:   Pressure reduction will facilitate healing, turn and reposition at least every 2 hours. Buttock wound cultured 5/21/21, 2/11/2022  Rx: Layo Segura in [place]                                Orders for this week: 9/1/2022     Edmund referral for skilled nursing wound care made to [facility] 9/1/22    - Please call client's caregiver Winsome to setup home visits to ensure she is present, visit after 9a ([phone])       FAX ORDERS TO [facility]! Right Buttock - wash with soap and water, pat dry. Apply Desitin, Clobetasol and stimulen powder to wound bed. Apply twice daily by caregiver. (On days that home care visits THURSDAYS also apply saline damp Hydrofera Blue cut to size of wound.)  Cover with Super Absorber or Silicone border gauze. PATIENT CANNOT REFUSE TO HAVE WOUND CARE PERFORMED       Follow up with Jolly Nicolas CNP in 2 weeks in the wound care center. Call [phone] for any questions or concerns.         Treatment Note      Written Patient Dismissal Instructions Given            Electronically signed by PRECIOUS Mays CNP on 9/1/2022 at 10:58 AM

## 2022-09-01 NOTE — DISCHARGE INSTRUCTIONS
PHYSICIAN ORDERS AND DISCHARGE INSTRUCTIONS     NOTE: Upon discharge from the [address], you will receive a patient experience survey. We would be grateful if you would take the time to fill this survey out. Wound care order history:                 KEVIN's   Right       Left               Date:              Cultures:  Buttock wound cultured 5/21/21                                Obtained on 2/11/2022                                Cultured on 3/11/2022              Grafts:                Antibiotics:           Continuing wound care orders and information:                 Residence:                Continue home health care with:  Alexia Mccall, supplies  Aide only not nursing  Fax [phone] [facility] Legal guardian [phone]                            Your wound-care supplies will be provided by: Wound cleansing:              Do not scrub or use excessive force. Wash hands with soap and water before and after dressing changes. Prior to applying a clean dressing, cleanse wound with normal saline, wound cleanser, or mild soap and water. Ask the physician or nurse before getting the wound(s) wet in a shower     Daily Wound management:              Keep weight off wounds and reposition every 2 hours. Avoid standing for long periods of time. Apply wraps/stockings in AM and remove at bedtime. If swelling is present, elevate legs to the level of the heart or above for 30 minutes 4-5 times a day and/or when sitting. When taking antibiotics take entire prescription as ordered by physician do not stop taking until medicine is all gone. Wound Care Notes:   Pressure reduction will facilitate healing, turn and reposition at least every 2 hours.   Buttock wound cultured 5/21/21, 2/11/2022  Rx: Layo Segura in [place]

## 2022-09-14 ENCOUNTER — TELEPHONE (OUTPATIENT)
Dept: WOUND CARE | Age: 32
End: 2022-09-14

## 2022-09-19 NOTE — CONSULTS
Department of Internal Medicine  Gastroenterology Consult Note  Moi Mclaughlin MD      Reason for Consult:  Abdominal pain    Primary Care Physician:  PRECIOUS Thompson CNP    History Obtained From:  patient    HISTORY OF PRESENT ILLNESS:              The patient is a 33 y.o. Female who presented to the ed with a history of diffuse abdominal cramps, diarrhea, nausea and vomiting. Her labs were negative, wbc was normal.  She underwent a ct of the abdomen which showed mild thickening of the rectosigmoid junction suggesting edema. Also an elongated sigmoid flexure was found and the though process is that this may have predisposed her to a sigmoid volvulus. She is mentally challenged and cannot give a good history. She denies any abdominal pain this am and states that she passed flatus overnight. Past Medical History:        Diagnosis Date    Anxiety     Back pain     Depression     Encephalitis     Hypertension     Meningitis     Paraplegia (HCC)     Suicidal ideation     Urinary incontinence        Past Surgical History:        Procedure Laterality Date    PRE-MALIGNANT / BENIGN SKIN LESION EXCISION      2015 left shoulder       Medications Prior to Admission:    Prior to Admission medications    Medication Sig Start Date End Date Taking? Authorizing Provider   hydrOXYzine (VISTARIL) 25 MG capsule Take 1 capsule by mouth 2 times daily 2/14/22   PRECIOUS Candelaria - CNP   white petrolatum (VASELINE) GEL Apply 28 g topically as needed for Dry Skin 2/14/22   PRECIOUS Candelaria CNP   ibuprofen (ADVIL;MOTRIN) 800 MG tablet Take 1 tablet by mouth every 6 hours as needed for Pain 1/27/22   PRECIOUS Candelaria CNP   Diapers & Supplies MISC XL diapers. Patient prefers ones that has sticky tabs on sides since she is a paraplegic. Change diaper every 4 hours.  1/19/22   PRECIOUS Candelaria CNP   citalopram (CELEXA) 10 MG tablet Take 1 tablet by mouth daily 1/12/22   PRECIOUS Candelaria CNP Allergies: Allergies   Allergen Reactions    Fish-Derived Products     Lemon Extract [Flavoring Agent]     Milk-Related Compounds     Trazodone And Nefazodone      Causes nightmares     . Social History:    TOBACCO:  No  ETOH:  No    Family History: No known family history of gi disease. Family History   Family history unknown: Yes       REVIEW OF SYSTEMS: (POSITIVES WILL BE UNDERLINED)  CONSTITUTIONAL:    Weight change,fatigue, fever, chills  EYES:  Diplopia, change in vision  EARS:  hearing loss, tinnitus, vertigo  NOSE:   epistaxis  MOUTH/THROAT:     hoarseness, sore throat. RESPIRATORY:  SOB,  cough, sputum, hemoptysis  CARDIOVASCULAR : chest pain,palpitations, dyspnea exertion, orthopnea, paroxysmal nocturnal dyspnea, pedal edema. GASTROINTESTINAL:  See HPI  GENITOURINARY:   dysuria, hematuria, . HEMATOLOGIC/LYMPHATIC:   Anemia, bleeding tendencies. MUSCULOSKELETAL:    myalgias,  joint pain  NEUROLOGICAL:   Loss of Consciousness, paresthesias, anesthesias, focal weakness  SKIN :   History of dermatitis, rashes  PSYCHIATRIC:  depression, , anxiety past psychosis  ENDOCRINE:  History of diabetes, thyroid disease  ALL/IMM : allergies, rashes    PHYSICAL EXAM:    Vitals:  /69   Pulse 81   Temp 98.1 °F (36.7 °C) (Oral)   Resp 12   Ht 5' 3\" (1.6 m)   Wt 164 lb 7.4 oz (74.6 kg)   SpO2 97%   BMI 29.13 kg/m²   CONSTITUTIONAL: alert, cooperative, no apparent distress,   EYES:  pupils equal, round and reactive to light and sclera clear  ENT:  normocepalic, without obvious abnormality  NECK:  supple, symmetrical, trachea midline  HEMATOLOGIC/LYMPHATICS:  no cervical lymphadenopathy and no supraclavicular lymphadenopathy  LUNGS:  clear to auscultation  CARDIOVASCULAR:  regular rate and rhythm and no murmur noted  ABDOMEN:  Soft, non-tender with normal bowel sounds.  No organomegaly or masses  NEUROLOGIC: no focal deficit detected  SKIN:  no lesions  EXTREMITIES: no clubbing, cyanosis, or [FreeTextEntry1] : Televisit\par \par General exam \par \par Neurologic: Alert and oriented x 3. \par \par Psychiatric: Patient is cooperative and appropriate.  Mood and affect are normal.  Patient's insight is good, and memory and judgment are intact.\par  edema    IMPRESSION:  1) No evidence of sigmoid volvulus presently. Appears to have normal bowel sounds with no or minimal abdominal distension. Suspect symptoms were from gastroenteritis. RECOMMENDATIONS:  1) Await surgical opinion.            Electronically signed by Kyle Collado MD on 4/20/2022 at 7:38 AM

## 2022-09-22 ENCOUNTER — HOSPITAL ENCOUNTER (OUTPATIENT)
Dept: WOUND CARE | Age: 32
Discharge: HOME | End: 2022-09-22

## 2022-09-22 NOTE — DISCHARGE INSTRUCTIONS
PHYSICIAN ORDERS AND DISCHARGE INSTRUCTIONS     NOTE: Upon discharge from the [address], you will receive a patient experience survey. We would be grateful if you would take the time to fill this survey out. Wound care order history:                 KEVIN's   Right       Left               Date:              Cultures:  Buttock wound cultured 5/21/21                                Obtained on 2/11/2022                                Cultured on 3/11/2022              Grafts:                Antibiotics:           Continuing wound care orders and information:                 Residence:                Continue home health care with:  Howard Sim, supplies  Aide only not nursing  Fax [phone] [facility] Legal guardian [phone]                            Your wound-care supplies will be provided by: Wound cleansing:              Do not scrub or use excessive force. Wash hands with soap and water before and after dressing changes. Prior to applying a clean dressing, cleanse wound with normal saline, wound cleanser, or mild soap and water. Ask the physician or nurse before getting the wound(s) wet in a shower     Daily Wound management:              Keep weight off wounds and reposition every 2 hours. Avoid standing for long periods of time. Apply wraps/stockings in AM and remove at bedtime. If swelling is present, elevate legs to the level of the heart or above for 30 minutes 4-5 times a day and/or when sitting. When taking antibiotics take entire prescription as ordered by physician do not stop taking until medicine is all gone. Wound Care Notes:   Pressure reduction will facilitate healing, turn and reposition at least every 2 hours.   Buttock wound cultured 5/21/21, 2/11/2022  Rx: Layo Segura in [place] Orders for this week: 9/1/2022     Edmund referral for skilled nursing wound care made to [facility] 9/1/22    - Please call client's caregiver Winsome to setup home visits to ensure she is present, visit after 9a ([phone])        FAX ORDERS TO [facility] Right Buttock - wash with soap and water, pat dry. Apply Desitin, Clobetasol and stimulen powder to wound bed. Apply twice daily by caregiver. (On days that home care visits THURSDAYS also apply saline damp Hydrofera Blue cut to size of wound.)  Cover with 6x6 Silicone border. PATIENT CANNOT REFUSE TO HAVE WOUND CARE PERFORMED        Follow up with All Ford CNP in 2 weeks in the wound care center. Call [phone] for any questions or concerns.

## 2022-10-20 ENCOUNTER — HOSPITAL ENCOUNTER (OUTPATIENT)
Dept: WOUND CARE | Age: 32
Discharge: HOME | End: 2022-10-20
Payer: COMMERCIAL

## 2022-10-20 VITALS — TEMPERATURE: 97.6 F | RESPIRATION RATE: 17 BRPM

## 2022-10-20 DIAGNOSIS — G82.20 PARAPLEGIA (HCC): ICD-10-CM

## 2022-10-20 DIAGNOSIS — L89.312 DECUBITUS ULCER OF RIGHT BUTTOCK, STAGE 2 (HCC): Primary | ICD-10-CM

## 2022-10-20 PROCEDURE — 11042 DBRDMT SUBQ TIS 1ST 20SQCM/<: CPT

## 2022-10-20 PROCEDURE — 11045 DBRDMT SUBQ TISS EACH ADDL: CPT | Performed by: NURSE PRACTITIONER

## 2022-10-20 PROCEDURE — 11042 DBRDMT SUBQ TIS 1ST 20SQCM/<: CPT | Performed by: NURSE PRACTITIONER

## 2022-10-20 PROCEDURE — 11045 DBRDMT SUBQ TISS EACH ADDL: CPT

## 2022-10-20 RX ORDER — CLOBETASOL PROPIONATE 0.5 MG/G
OINTMENT TOPICAL ONCE
OUTPATIENT
Start: 2022-10-20 | End: 2022-10-20

## 2022-10-20 RX ORDER — GINSENG 100 MG
CAPSULE ORAL ONCE
OUTPATIENT
Start: 2022-10-20 | End: 2022-10-20

## 2022-10-20 RX ORDER — BACITRACIN ZINC AND POLYMYXIN B SULFATE 500; 1000 [USP'U]/G; [USP'U]/G
OINTMENT TOPICAL ONCE
OUTPATIENT
Start: 2022-10-20 | End: 2022-10-20

## 2022-10-20 RX ORDER — LIDOCAINE HYDROCHLORIDE 40 MG/ML
SOLUTION TOPICAL ONCE
OUTPATIENT
Start: 2022-10-20 | End: 2022-10-20

## 2022-10-20 RX ORDER — LIDOCAINE 40 MG/G
CREAM TOPICAL ONCE
OUTPATIENT
Start: 2022-10-20 | End: 2022-10-20

## 2022-10-20 RX ORDER — BACITRACIN, NEOMYCIN, POLYMYXIN B 400; 3.5; 5 [USP'U]/G; MG/G; [USP'U]/G
OINTMENT TOPICAL ONCE
OUTPATIENT
Start: 2022-10-20 | End: 2022-10-20

## 2022-10-20 RX ORDER — CLOBETASOL PROPIONATE 0.5 MG/G
OINTMENT TOPICAL ONCE
Status: DISCONTINUED | OUTPATIENT
Start: 2022-10-20 | End: 2022-10-21 | Stop reason: HOSPADM

## 2022-10-20 RX ORDER — LIDOCAINE 50 MG/G
OINTMENT TOPICAL ONCE
OUTPATIENT
Start: 2022-10-20 | End: 2022-10-20

## 2022-10-20 RX ORDER — GENTAMICIN SULFATE 1 MG/G
OINTMENT TOPICAL ONCE
OUTPATIENT
Start: 2022-10-20 | End: 2022-10-20

## 2022-10-20 RX ORDER — BETAMETHASONE DIPROPIONATE 0.05 %
OINTMENT (GRAM) TOPICAL ONCE
OUTPATIENT
Start: 2022-10-20 | End: 2022-10-20

## 2022-10-20 ASSESSMENT — PAIN - FUNCTIONAL ASSESSMENT: PAIN_FUNCTIONAL_ASSESSMENT: PREVENTS OR INTERFERES SOME ACTIVE ACTIVITIES AND ADLS

## 2022-10-20 NOTE — PROGRESS NOTES
Wound Care Center Progress Visit      Mal Mills  AGE: 27 y.o. GENDER: female  : 1990  EPISODE DATE:  10/20/2022   Referred by: Self    Subjective:     CHIEF COMPLAINT WOUND RIGHT BUTTOCK/THIGH     HISTORY of PRESENT ILLNESS      Mal Mills is a 27 y.o. female who presents to the [address] [place] [address] for an initial visit for evaluation and treatment of Acute on chronic pressure and moisture   ulcer of  right buttock/thigh. The condition is of moderate severity. The ulcer has been present for approximately 2  weeks at the wound clinic visit 22. The underlying cause is thought to be pressure and moisture. The patients care to date has included nothing. The patient has significant underlying medical conditions as below. Wound Pain Timing/Severity: intermittent, mild  Quality of pain: burning, tender  Severity of pain:  2 / 10   Modifying Factors: chronic pressure, decreased mobility, shear force, and obesity  Associated Signs/Symptoms: drainage and pain    KEVIN: as indicated base on wound location and assessment    Wound infection: wound culture will be obtained as needed for symptoms of infection. No local or system S&S infection    Arterial evaluation: if indicated based on wound, location, symptoms and healing    Venous Evaluation: if indicated based on wound, location, symptoms and healing    Diabetes: No  Smoking: Never smoker  Anticoagulant/Antiplatelet therapy: No  Immunosuppression: No  Obesity: Yes  Other History: Paraplegia    Patient educated on the 6 essential components necessary for wound healing: Circulation, Debridements, Proper Dressings and Topical Wound Products, Infection Control, Edema Control and Offloading.      Patient educated on those factors that negatively effect or impact wound healing: smoking, obesity, uncontrolled diabetes, anticoagulant and immunosuppressive regimens, inadequate nutrition, untreated arterial and venous disease if applicable and measures to manage edema. Nutritional status: well nourished. Discussed need for increased protein and calories for wound healing and good sources of protein (just over 7 grams for every 20 pounds of body weight). Animal-based foods high in protein (meat, poultry, fish, eggs, and dairy foods). Plant based foods high in protein (tofu, lentils, beans, chickpeas, nuts, quinoa and karl seeds. Off Loading  Offloading or minimizing or removing weight placed on an area with poor circulation such as diabetic wounds or pressure. This can be achieved with crutches, wheel chair, knee walker etc. Minimizing pressure through partial weight bearing (minimizing the amount of  pressure applied and or the amount of time on the area of pressure) or maintaining a non-weight bearing status can be used to promote and often can be essential for thee wound to heal. Off loading may also need to be achieved for non-weight bearing wounds such as pressure ulcers to the torso. Turning and changing positions frequently, at least every two hours. Use of pressure cushion if sitting up in chair. Skin Care  Keep skin clean and well moisturized , moisturize routinely with ointments for heavier moisturizer needs for extremely dry skin or cracks such as A&D ointment and lotions for a light moisturizer such as CeraVe or Eucerin. If incontinent change incontinence garments as soon as soiled and keeping skin clean and use barrier cream to protect the skin. Reduce Salt and Sodium  Choose low- or reduced- sodium, or no-salt-added versions of foods and condiments when available. Buy fresh, plain frozen, or canned with no-added-salt vegetables. Use fresh poultry, fish and lean meat, rather than canned, smoked or processed types. Choose ready-to-eat breakfast cereals that are lower in sodium. Limit cured foods (such as barnes and ham), foods packed in brine (such as pickled foods) and condiments (such as MSG, mustard, horseradish, and catsup).  Limit even lower sodium versions of soy sauce and teriyaki sauce-treat these condiments just like salt). In cooking and at the table, flavor foods with herbs, spices, lemon, lime, vinegar or salt-free seasoning blends. Start by cutting salt in half. Cook rice, pasta and hot cereals without salt. Cut back on instant or flavored rice, pasta and cereal mixes, which usually have added salt. Choose convenience foods that are lower in sodium. Limit frozen dinners, packaged mixes, canned soups and dressings. Rinse canned foods, such as tuna, to remove some sodium. Choose fruits or vegetables instead of salty snack foods. Edema Management if applicable  Whenever resting, raise your legs up. Try to keep the swollen area higher than the level of your heart. Take breaks from standing or sitting in one position. Walk around to increase the blood flow in your lower legs. Move your feet and ankles often while you stand, or tighten and relax your leg muscles. Wear support stockings. Put them on in the morning, before swelling gets worse. Eat a balanced diet. Lose weight if you need to. Limit the amount of salt (sodium) in your diet. Salt holds fluid in the body and may increase swelling. Apply compression stocking(s) every morning as soon as you get up. Remove at bedtime unless instructed to wear day and night. Hand wash and line dry to prevent loss of elasticity. Replace every 3-4 months to ensure proper fit. Weight Management if Applicable  Will need to ultimately change overall eating behaviors to have success with weight loss. Encouraged to weigh daily and work towards a goal of 1-2 pounds of weight loss weekly. Encouraged to journal all food intake, [place] pal is a useful tool to help keep track of food intake and caloric value. Keep calorie level at approximately 7731-3692. Protein intake is to be a minimum of 60 grams per day (unless otherwise directed). Water drinking was encouraged with a goal of 64oz-128oz daily. Beverages to be calorie free except for milk. Every other beverage should be water, avoid soda. Continue to increase level of physical activity. Refer to weight management as indicated and requested by patient. PAST MEDICAL HISTORY        Diagnosis Date    Anxiety     Back pain     Depression     Encephalitis     Hypertension     Meningitis     Paraplegia (HCC)     Suicidal ideation     Urinary incontinence        PAST SURGICAL HISTORY    Past Surgical History:   Procedure Laterality Date    PRE-MALIGNANT / BENIGN SKIN LESION EXCISION      2015 left shoulder       FAMILY HISTORY    Family History   Family history unknown: Yes       SOCIAL HISTORY    Social History     Tobacco Use    Smoking status: Never    Smokeless tobacco: Never   Vaping Use    Vaping Use: Never used   Substance Use Topics    Alcohol use: No    Drug use: No       ALLERGIES    Allergies   Allergen Reactions    Fish-Derived Products     Lemon Extract [Flavoring Agent]     Milk-Related Compounds     Trazodone And Nefazodone      Causes nightmares         MEDICATIONS    Current Outpatient Medications on File Prior to Encounter   Medication Sig Dispense Refill    busPIRone (BUSPAR) 5 MG tablet Take 5 mg by mouth 3 times daily      nystatin (MYCOSTATIN) 808367 UNIT/ML suspension Take 5 mLs by mouth 4 times daily Swish and swallow 5 ml, 4 times a day, 8am, 12pm, 4pm, 8pm. 140 mL 0    citalopram (CELEXA) 20 MG tablet Take 1 tablet by mouth daily 30 tablet 0    hydrOXYzine (VISTARIL) 25 MG capsule Take 1 capsule by mouth 2 times daily 60 capsule 3    white petrolatum (VASELINE) GEL Apply 28 g topically as needed for Dry Skin 212 g 3    ibuprofen (ADVIL;MOTRIN) 800 MG tablet Take 1 tablet by mouth every 6 hours as needed for Pain 120 tablet 1    Diapers & Supplies MISC XL diapers. Patient prefers ones that has sticky tabs on sides since she is a paraplegic. Change diaper every 4 hours.  120 each 12     No current facility-administered medications on file prior to encounter. PROBLEM LIST    Patient Active Problem List   Diagnosis    Nevus    Major depressive disorder    Hallucinations    WD-Paraplegia (HCC)    Altered mental status    Other insomnia    Anxiety    Acute gastroenteritis    WD-Decubitus ulcer of right buttock, stage 2 (HCC)       REVIEW OF SYSTEMS    Constitutional: negative for anorexia, chills, fatigue, fevers, malaise, sweats, and weight loss  Respiratory: negative for cough, dyspnea on exertion, hemoptysis, pleurisy/chest pain, shortness of breath, sputum, stridor, and wheezing  Cardiovascular: negative for chest pain, chest pressure/discomfort, dyspnea, exertional chest pressure/discomfort, irregular heart beat, near-syncope, orthopnea, palpitations, paroxysmal nocturnal dyspnea, syncope, and tachypnea  Integument/breast: positive for skin lesion(s)      Objective:      Temp 97.6 °F (36.4 °C) (Temporal)   Resp 17     PHYSICAL EXAM  General Appearance: alert and oriented to person, place and time, well-developed and well-nourished, in no acute distress  Pulmonary/Chest: clear to auscultation bilaterally- no wheezes, rales or rhonchi, normal air movement, no respiratory distress  Cardiovascular: normal rate, normal S1 and S2, no gallops, and intact distal pulses  Dermatologic exam: Visual inspection of the periwound reveals the skin to be moist  Wound exam: see wound description below in procedure note      Assessment:       Miguel Keys  appears to have a non-healing wound of the right buttock/thigh. The etiology of the wound is felt to be pressure and moisture . There are multiple complicating factors including chronic pressure, decreased mobility, shear force, and obesity. A comprehensive wound management program would be helpful to heal this wound. Assessments completed include fall risk and nutritional, functional,and psychological status.   At this time appropriate care would include: periodic debridement and wound care as below.   Problem List Items Addressed This Visit          Other    WD-Decubitus ulcer of right buttock, stage 2 (HCC) - Primary    Relevant Medications    clobetasol (TEMOVATE) 0.05 % ointment    Other Relevant Orders    Initiate Outpatient Wound Care Protocol    [facility])    Relevant Medications    clobetasol (TEMOVATE) 0.05 % ointment    Other Relevant Orders    Initiate Outpatient Wound Care Protocol       Procedure Note    Indications:  Based on my examination of this patient's wound(s) today, sharp excision into necrotic subcutaneous tissue is required to promote healing and evaluate the extent of previous healing. Performed by: PRECIOUS Armenta CNP    Consent obtained: Yes    Time out taken:  Yes    Pain Control: Anesthetic  Anesthetic: 4% Lidocaine Liquid Topical        Debridement:Excisional Debridement    Using curette the wound(s) was/were sharply debrided down through and including the removal of subcutaneous tissue.         Devitalized Tissue Debrided:  fibrin, biofilm, slough, and necrotic/eschar    Pre Debridement Measurements:  Are located in the Wound Documentation Flow Sheet    All active wounds listed below with today's date are evaluated  Wound(s)    debrided this date include # : 1     Post  Debridement Measurements:  Wound 08/01/22 #1 Right Buttock Cluster (Active)   Wound Image   09/01/22 0940   Wound Etiology Pressure Stage 2 10/20/22 1022   Dressing Status New dressing applied 10/20/22 1046   Wound Cleansed Wound cleanser 10/20/22 1022   Offloading for Diabetic Foot Ulcers Offloading not required 10/20/22 1046   Wound Length (cm) 6 cm 10/20/22 1022   Wound Width (cm) 5.5 cm 10/20/22 1022   Wound Depth (cm) 0.1 cm 10/20/22 1022   Wound Surface Area (cm^2) 33 cm^2 10/20/22 1022   Change in Wound Size % (l*w) 63.33 10/20/22 1022   Wound Volume (cm^3) 3.3 cm^3 10/20/22 1022   Wound Healing % 63 10/20/22 1022   Post-Procedure Length (cm) 6 cm 10/20/22 1026   Post-Procedure Width (cm) 5.5 cm 10/20/22 1026   Post-Procedure Depth (cm) 0.1 cm 10/20/22 1026   Post-Procedure Surface Area (cm^2) 33 cm^2 10/20/22 1026   Post-Procedure Volume (cm^3) 3.3 cm^3 10/20/22 1026   Distance Tunneling (cm) 0 cm 10/20/22 1022   Tunneling Position ___ O'Clock 0 10/20/22 1022   Undermining Starts ___ O'Clock 0 10/20/22 1022   Undermining Ends___ O'Clock 0 10/20/22 1022   Undermining Maxium Distance (cm) 0 10/20/22 1022   Wound Assessment Pink/red;Granulation tissue 10/20/22 1022   Drainage Amount Moderate 10/20/22 1022   Drainage Description Serosanguinous 10/20/22 1022   Odor None 10/20/22 1022   Dyan-wound Assessment Fragile; Intact 10/20/22 1022   Margins Undefined edges 10/20/22 1022   Wound Thickness Description not for Pressure Injury Full thickness 10/20/22 1022   Number of days: 80     Total  Area  Debrided:  33 sq cm     Bleeding:  Minimal    Hemostasis Achieved:  by pressure    Procedural Pain:  0  / 10     Post Procedural Pain:  0 / 10     Response to treatment:  Well tolerated by patient. Status of wound: Recurrent pressure and moisture ulcer, the patient has not been to the wound clinic for multiple weeks. The wound is larger today. Regimen as below, Home Health in place. Reinforced off loading and skin care as above with Pat her CG. Care Team/History: She in the past has been followed by Dr. Cassidy Rosario but has been coming to this clinic for the last few visit. The patient lives at [facility], she has assistance from supporting independence and has a caregiver Martha Hernandez May [phone]) with her today that cares for her Monday-Thursday (9am-5pm). She has had Home Health services through [facility] that recently discharged her because she was not letting them change the dressing after multiple visit. The CG as above is not allowed to perform advanced dressing changes.  Christiano West from [address] [place] [address] the patient frequently has saturated incontinence garments when she had been there.The spoke with Jamie Walker ([phone])  with supporting independence, we discussed above and she verbalizes the patient often does refuse incontinence garment changes and they are not allowed to force her to get changed. She verbalizes Manjuelmo Abrahamisidra [phone] [address] was who should be contacted to discuss the case with. She as well verbalizes they can't force the patient to get routine wound or incontinence care. A message was left with legal guardian Yunior Look to call me about the patient. Plan:     Discharge instructions:    Discharge Instructions         PHYSICIAN ORDERS AND DISCHARGE INSTRUCTIONS     NOTE: Upon discharge from the [address], you will receive a patient experience survey. We would be grateful if you would take the time to fill this survey out. Wound care order history:                 KEVIN's   Right       Left               Date:              Cultures:  Buttock wound cultured 5/21/21                                Obtained on 2/11/2022                                Cultured on 3/11/2022              Grafts:                Antibiotics:           Continuing wound care orders and information:                 Residence:                Continue home health care with:  Tristian Palencia, supplies  Aide only not nursing  Fax [phone] [employer] [facility] Legal guardian [phone]                            Your wound-care supplies will be provided by: Wound cleansing:              Do not scrub or use excessive force. Wash hands with soap and water before and after dressing changes. Prior to applying a clean dressing, cleanse wound with normal saline, wound cleanser, or mild soap and water. Ask the physician or nurse before getting the wound(s) wet in a shower     Daily Wound management:              Keep weight off wounds and reposition every 2 hours.               Avoid standing for long periods of time. Apply wraps/stockings in AM and remove at bedtime. If swelling is present, elevate legs to the level of the heart or above for 30 minutes 4-5 times a day and/or when sitting. When taking antibiotics take entire prescription as ordered by physician do not stop taking until medicine is all gone. Wound Care Notes:   Pressure reduction will facilitate healing, turn and reposition at least every 2 hours. Buttock wound cultured 5/21/21, 2/11/2022  Rx: Bear Imelda in [place]                                Orders for this week: 10/20/2022     Please call client's caregiver Winsome to setup home visits to ensure she is present, visit after 9a ([phone])        [address] [place] [address],[facility]! Right Buttock - wash with soap and water, pat dry. Apply Desitin, Clobetasol and stimulen powder to wound bed. Apply twice daily by caregiver. (On days that home care visits THURSDAYS also apply saline damp Hydrofera Blue cut to size of wound.)  Cover with Super Absorber or Silicone border gauze. PATIENT CANNOT REFUSE TO HAVE WOUND CARE PERFORMED        Follow up with Harpal Samson CNP in 2 weeks in the wound care center. Call [phone] for any questions or concerns.         Treatment Note Wound 08/01/22 #1 Right Buttock Cluster-Dressing/Treatment:  (desitin clobetasol stimulen powder)    Written Patient Dismissal Instructions Given            Electronically signed by PRECIOUS Flowers CNP on 10/20/2022 at 3:34 PM

## 2022-10-20 NOTE — DISCHARGE INSTRUCTIONS
PHYSICIAN ORDERS AND DISCHARGE INSTRUCTIONS     NOTE: Upon discharge from the [address], you will receive a patient experience survey. We would be grateful if you would take the time to fill this survey out. Wound care order history:                 KEVIN's   Right       Left               Date:              Cultures:  Buttock wound cultured 5/21/21                                Obtained on 2/11/2022                                Cultured on 3/11/2022              Grafts:                Antibiotics:           Continuing wound care orders and information:                 Residence:                Continue home health care with:  Parker Lopes, supplies  Aide only not nursing  Fax [phone] [facility] Legal guardian [phone]                            Your wound-care supplies will be provided by: Wound cleansing:              Do not scrub or use excessive force. Wash hands with soap and water before and after dressing changes. Prior to applying a clean dressing, cleanse wound with normal saline, wound cleanser, or mild soap and water. Ask the physician or nurse before getting the wound(s) wet in a shower     Daily Wound management:              Keep weight off wounds and reposition every 2 hours. Avoid standing for long periods of time. Apply wraps/stockings in AM and remove at bedtime. If swelling is present, elevate legs to the level of the heart or above for 30 minutes 4-5 times a day and/or when sitting. When taking antibiotics take entire prescription as ordered by physician do not stop taking until medicine is all gone. Wound Care Notes:   Pressure reduction will facilitate healing, turn and reposition at least every 2 hours.   Buttock wound cultured 5/21/21, 2/11/2022  Rx: Layo Segura in [place] Orders for this week: 10/20/2022     Please call client's caregiver Winsome to setup home visits to ensure she is present, visit after 9a ([phone])        FAX ORDERS TO [facility] Right Buttock - wash with soap and water, pat dry. Apply Desitin, Clobetasol and stimulen powder to wound bed. Apply twice daily by caregiver. (On days that home care visits THURSDAYS also apply saline damp Hydrofera Blue cut to size of wound.)  Cover with Super Absorber or Silicone border gauze. PATIENT CANNOT REFUSE TO HAVE WOUND CARE PERFORMED        Follow up with Kenyon Woodruff CNP in 2 weeks in the wound care center. Call [phone] for any questions or concerns.

## 2022-11-01 NOTE — DISCHARGE INSTRUCTIONS
PHYSICIAN ORDERS AND DISCHARGE INSTRUCTIONS     NOTE: Upon discharge from the [address], you will receive a patient experience survey. We would be grateful if you would take the time to fill this survey out. Wound care order history:                 KEVIN's   Right       Left               Date:              Cultures:  Buttock wound cultured 5/21/21                                Obtained on 2/11/2022                                Cultured on 3/11/2022              Grafts:                Antibiotics:           Continuing wound care orders and information:                 Residence:                Continue home health care with:  Niko Feldman, supplies  Aide only not nursing  Fax [phone] [facility] Legal guardian [phone]                            Your wound-care supplies will be provided by: Wound cleansing:              Do not scrub or use excessive force. Wash hands with soap and water before and after dressing changes. Prior to applying a clean dressing, cleanse wound with normal saline, wound cleanser, or mild soap and water. Ask the physician or nurse before getting the wound(s) wet in a shower     Daily Wound management:              Keep weight off wounds and reposition every 2 hours. Avoid standing for long periods of time. Apply wraps/stockings in AM and remove at bedtime. If swelling is present, elevate legs to the level of the heart or above for 30 minutes 4-5 times a day and/or when sitting. When taking antibiotics take entire prescription as ordered by physician do not stop taking until medicine is all gone. Wound Care Notes:   Pressure reduction will facilitate healing, turn and reposition at least every 2 hours.   Buttock wound cultured 5/21/21, 2/11/2022  Rx: Layo Seguar in [place] Orders for this week: 11/3/2022     Please call client's caregiver Winsome to setup home visits to ensure she is present, visit after 9a ([phone])        FAX ORDERS TO [facility] Right Buttock - wash with soap and water, pat dry. Apply Desitin, Clobetasol and stimulen powder to wound bed. Apply twice daily by caregiver. (On days that home care visits THURSDAYS also apply saline damp Hydrofera Blue cut to size of wound.)  Cover with Super Absorber or Silicone border gauze. PATIENT CANNOT REFUSE TO HAVE WOUND CARE PERFORMED        Follow up with Toña Salcedo CNP in 2 weeks in the wound care center. Call [phone] for any questions or concerns.

## 2022-11-03 ENCOUNTER — HOSPITAL ENCOUNTER (OUTPATIENT)
Dept: WOUND CARE | Age: 32
Discharge: HOME | End: 2022-11-03

## 2022-11-10 ENCOUNTER — HOSPITAL ENCOUNTER (OUTPATIENT)
Dept: WOUND CARE | Age: 32
Discharge: HOME | End: 2022-11-10
Payer: COMMERCIAL

## 2022-11-10 VITALS
SYSTOLIC BLOOD PRESSURE: 144 MMHG | RESPIRATION RATE: 18 BRPM | HEART RATE: 87 BPM | TEMPERATURE: 96.5 F | DIASTOLIC BLOOD PRESSURE: 73 MMHG

## 2022-11-10 DIAGNOSIS — L89.312 DECUBITUS ULCER OF RIGHT BUTTOCK, STAGE 2 (HCC): Primary | ICD-10-CM

## 2022-11-10 DIAGNOSIS — G82.20 PARAPLEGIA (HCC): ICD-10-CM

## 2022-11-10 PROCEDURE — 99213 OFFICE O/P EST LOW 20 MIN: CPT | Performed by: NURSE PRACTITIONER

## 2022-11-10 PROCEDURE — 99214 OFFICE O/P EST MOD 30 MIN: CPT

## 2022-11-10 PROCEDURE — 11042 DBRDMT SUBQ TIS 1ST 20SQCM/<: CPT

## 2022-11-10 RX ORDER — GINSENG 100 MG
CAPSULE ORAL ONCE
Status: CANCELLED | OUTPATIENT
Start: 2022-11-10 | End: 2022-11-10

## 2022-11-10 RX ORDER — LIDOCAINE HYDROCHLORIDE 40 MG/ML
SOLUTION TOPICAL ONCE
Status: CANCELLED | OUTPATIENT
Start: 2022-11-10 | End: 2022-11-10

## 2022-11-10 RX ORDER — GENTAMICIN SULFATE 1 MG/G
OINTMENT TOPICAL ONCE
Status: CANCELLED | OUTPATIENT
Start: 2022-11-10 | End: 2022-11-10

## 2022-11-10 RX ORDER — BETAMETHASONE DIPROPIONATE 0.05 %
OINTMENT (GRAM) TOPICAL ONCE
Status: CANCELLED | OUTPATIENT
Start: 2022-11-10 | End: 2022-11-10

## 2022-11-10 RX ORDER — CLOBETASOL PROPIONATE 0.5 MG/G
OINTMENT TOPICAL ONCE
Status: CANCELLED | OUTPATIENT
Start: 2022-11-10 | End: 2022-11-10

## 2022-11-10 RX ORDER — BACITRACIN, NEOMYCIN, POLYMYXIN B 400; 3.5; 5 [USP'U]/G; MG/G; [USP'U]/G
OINTMENT TOPICAL ONCE
Status: CANCELLED | OUTPATIENT
Start: 2022-11-10 | End: 2022-11-10

## 2022-11-10 RX ORDER — BACITRACIN ZINC AND POLYMYXIN B SULFATE 500; 1000 [USP'U]/G; [USP'U]/G
OINTMENT TOPICAL ONCE
Status: CANCELLED | OUTPATIENT
Start: 2022-11-10 | End: 2022-11-10

## 2022-11-10 RX ORDER — LIDOCAINE 40 MG/G
CREAM TOPICAL ONCE
Status: CANCELLED | OUTPATIENT
Start: 2022-11-10 | End: 2022-11-10

## 2022-11-10 RX ORDER — CLOBETASOL PROPIONATE 0.5 MG/G
OINTMENT TOPICAL ONCE
Status: DISCONTINUED | OUTPATIENT
Start: 2022-11-10 | End: 2022-11-11 | Stop reason: HOSPADM

## 2022-11-10 RX ORDER — LIDOCAINE 50 MG/G
OINTMENT TOPICAL ONCE
Status: CANCELLED | OUTPATIENT
Start: 2022-11-10 | End: 2022-11-10

## 2022-11-10 NOTE — DISCHARGE INSTRUCTIONS
PHYSICIAN ORDERS AND DISCHARGE INSTRUCTIONS     NOTE: Upon discharge from the [address], you will receive a patient experience survey. We would be grateful if you would take the time to fill this survey out. Wound care order history:                 KEVIN's   Right       Left               Date:              Cultures:  Buttock wound cultured 5/21/21                                Obtained on 2/11/2022                                Cultured on 3/11/2022              Grafts:                Antibiotics:           Continuing wound care orders and information:                 Residence:                Continue home health care with:  Corbin Schmid, supplies  Aide only not nursing  Fax [phone] [facility] Legal guardian [phone]                            Your wound-care supplies will be provided by: Wound cleansing:              Do not scrub or use excessive force. Wash hands with soap and water before and after dressing changes. Prior to applying a clean dressing, cleanse wound with normal saline, wound cleanser, or mild soap and water. Ask the physician or nurse before getting the wound(s) wet in a shower     Daily Wound management:              Keep weight off wounds and reposition every 2 hours. Avoid standing for long periods of time. Apply wraps/stockings in AM and remove at bedtime. If swelling is present, elevate legs to the level of the heart or above for 30 minutes 4-5 times a day and/or when sitting. When taking antibiotics take entire prescription as ordered by physician do not stop taking until medicine is all gone. Wound Care Notes:   Pressure reduction will facilitate healing, turn and reposition at least every 2 hours.   Buttock wound cultured 5/21/21, 2/11/2022  Rx: Layo Segura in [place] Orders for this week: 11/10/2022     Please call client's caregiver Winsome to setup home visits to ensure she is present, visit after 9a ([phone])        FAX ORDERS TO [facility]! Right Buttock - wash with soap and water, pat dry. Apply Desitin, Clobetasol and stimulen powder to wound bed. Apply twice daily by caregiver. Cover with Super Absorber or Silicone border. PATIENT CANNOT REFUSE TO HAVE WOUND CARE PERFORMED        Follow up with Fidelina Randolph CNP in 2 weeks in the wound care center. Call [phone] for any questions or concerns.

## 2022-11-10 NOTE — PROGRESS NOTES
Wound Care Center Progress Note       Greggory Phoenix  AGE: 27 y.o. GENDER: female  : 1990  TODAY'S DATE:  11/10/2022        Subjective:     Chief Complaint   Patient presents with    Wound Check     Right buttock          HISTORY of PRESENT ILLNESS     Greggory Phoenix is a 27 y.o. female who presents to the [address] [place] [address] for an initial visit for evaluation and treatment of Acute on chronic pressure and moisture   ulcer of  right buttock/thigh. The condition is of moderate severity. The ulcer has been present for approximately 2  weeks at the wound clinic visit 22. The underlying cause is thought to be pressure and moisture. The patients care to date has included nothing. The patient has significant underlying medical conditions as below. Patient much improved.  Wound is essentially healed  Given thin new skin and pressure issues we will likely treat for a week or 2 more before discharge   No issues and home health coming as ordered     Wound Pain Timing/Severity: intermittent, mild  Quality of pain: burning, tender  Severity of pain:  2 / 10   Modifying Factors: chronic pressure, decreased mobility, shear force, and obesity  Associated Signs/Symptoms: drainage and pain        PAST MEDICAL HISTORY        Diagnosis Date    Anxiety     Back pain     Depression     Encephalitis     Hypertension     Meningitis     Paraplegia (HCC)     Suicidal ideation     Urinary incontinence        PAST SURGICAL HISTORY    Past Surgical History:   Procedure Laterality Date    PRE-MALIGNANT / BENIGN SKIN LESION EXCISION      2015 left shoulder       FAMILY HISTORY    Family History   Family history unknown: Yes       SOCIAL HISTORY    Social History     Tobacco Use    Smoking status: Never    Smokeless tobacco: Never   Vaping Use    Vaping Use: Never used   Substance Use Topics    Alcohol use: No    Drug use: No       ALLERGIES    Allergies   Allergen Reactions    Fish-Derived Products     Lemon Extract [Flavoring Agent]     Milk-Related Compounds     Trazodone And Nefazodone      Causes nightmares         MEDICATIONS    Current Outpatient Medications on File Prior to Encounter   Medication Sig Dispense Refill    busPIRone (BUSPAR) 5 MG tablet Take 5 mg by mouth 3 times daily      nystatin (MYCOSTATIN) 245114 UNIT/ML suspension Take 5 mLs by mouth 4 times daily Swish and swallow 5 ml, 4 times a day, 8am, 12pm, 4pm, 8pm. 140 mL 0    citalopram (CELEXA) 20 MG tablet Take 1 tablet by mouth daily 30 tablet 0    hydrOXYzine (VISTARIL) 25 MG capsule Take 1 capsule by mouth 2 times daily 60 capsule 3    white petrolatum (VASELINE) GEL Apply 28 g topically as needed for Dry Skin 212 g 3    ibuprofen (ADVIL;MOTRIN) 800 MG tablet Take 1 tablet by mouth every 6 hours as needed for Pain 120 tablet 1    Diapers & Supplies MISC XL diapers. Patient prefers ones that has sticky tabs on sides since she is a paraplegic. Change diaper every 4 hours. 120 each 12     No current facility-administered medications on file prior to encounter. REVIEW OF SYSTEMS    Pertinent items are noted in HPI. Constitutional: Negative for systemic symptoms including fever, chills and malaise. Objective:      BP (!) 144/73   Pulse 87   Temp (!) 96.5 °F (35.8 °C) (Temporal)   Resp 18     PHYSICAL EXAM      General: The patient is in no acute distress. Mental status:  Patient is appropriate, is  oriented to place and plan of care. Dermatologic exam: Visual inspection of the periwound reveals the skin to be normal in turgor and texture, dry, and coarse.   Wound exam:  see wound description below     All active wounds listed below with today's date are evaluated      Wound 02/11/19 WOUND #1 RIGHT POSTERIOR THIGH pressure 1 (Active)   Number of days: 1368       Wound 05/21/21 Buttocks Right Right Ischium  (Active)   Number of days: 538       Wound 08/01/22 #1 Right Buttock Cluster (Active)   Wound Image   11/10/22 0940   Wound Etiology Pressure Stage 2 10/20/22 1022   Dressing Status New dressing applied 10/20/22 1046   Wound Cleansed Wound cleanser 11/10/22 0940   Offloading for Diabetic Foot Ulcers Offloading not required 11/10/22 0940   Wound Length (cm) 3 cm 11/10/22 0940   Wound Width (cm) 0.5 cm 11/10/22 0940   Wound Depth (cm) 0.1 cm 11/10/22 0940   Wound Surface Area (cm^2) 1.5 cm^2 11/10/22 0940   Change in Wound Size % (l*w) 98.33 11/10/22 0940   Wound Volume (cm^3) 0.15 cm^3 11/10/22 0940   Wound Healing % 98 11/10/22 0940   Post-Procedure Length (cm) 6 cm 10/20/22 1026   Post-Procedure Width (cm) 5.5 cm 10/20/22 1026   Post-Procedure Depth (cm) 0.1 cm 10/20/22 1026   Post-Procedure Surface Area (cm^2) 33 cm^2 10/20/22 1026   Post-Procedure Volume (cm^3) 3.3 cm^3 10/20/22 1026   Distance Tunneling (cm) 0 cm 11/10/22 0940   Tunneling Position ___ O'Clock 0 11/10/22 0940   Undermining Starts ___ O'Clock 0 11/10/22 0940   Undermining Ends___ O'Clock 0 11/10/22 0940   Undermining Maxium Distance (cm) 0 11/10/22 0940   Wound Assessment Dry;Eschar dry;Pink/red 11/10/22 0940   Drainage Amount None 11/10/22 0940   Drainage Description Serosanguinous 10/20/22 1022   Odor None 11/10/22 0940   Dyan-wound Assessment Fragile; Intact 11/10/22 0940   Margins Undefined edges 11/10/22 0940   Wound Thickness Description not for Pressure Injury Full thickness 11/10/22 0940   Number of days: 100       Assessment:       Problem List Items Addressed This Visit          Other    WD-Decubitus ulcer of right buttock, stage 2 ([facility] [address] - Primary    Relevant Orders    Initiate Outpatient Wound Care Protocol    WD-Paraplegia [facility])    Relevant Orders    Initiate Outpatient Wound Care Protocol       Status of wound progress and description from last visit:   Essentially healed.     Continue current plan of care minus hydrofera blue  Follow up 2 weeks and hope to discharge at that time         Plan:     Discharge Instructions         [address] INSTRUCTIONS     NOTE: Upon discharge from the [address], you will receive a patient experience survey. We would be grateful if you would take the time to fill this survey out. Wound care order history:                 KEVIN's   Right       Left               Date:              Cultures:  Buttock wound cultured 5/21/21                                Obtained on 2/11/2022                                Cultured on 3/11/2022              Grafts:                Antibiotics:           Continuing wound care orders and information:                 Residence:                Continue home health care with:  Rd Kuo, supplies  Aide only not nursing  Fax [phone] [facility] Legal guardian [phone]                            Your wound-care supplies will be provided by: Wound cleansing:              Do not scrub or use excessive force. Wash hands with soap and water before and after dressing changes. Prior to applying a clean dressing, cleanse wound with normal saline, wound cleanser, or mild soap and water. Ask the physician or nurse before getting the wound(s) wet in a shower     Daily Wound management:              Keep weight off wounds and reposition every 2 hours. Avoid standing for long periods of time. Apply wraps/stockings in AM and remove at bedtime. If swelling is present, elevate legs to the level of the heart or above for 30 minutes 4-5 times a day and/or when sitting. When taking antibiotics take entire prescription as ordered by physician do not stop taking until medicine is all gone. Wound Care Notes:   Pressure reduction will facilitate healing, turn and reposition at least every 2 hours.   Buttock wound cultured 5/21/21, 2/11/2022  Rx: Layo Segura in [place]                                Orders for this week: 11/10/2022     Please call client's caregiver Winsome to setup home visits to ensure she is present, visit after 9a ([phone])        [address] [place] [address],[facility]! Right Buttock - wash with soap and water, pat dry. Apply Desitin, Clobetasol and stimulen powder to wound bed. Apply twice daily by caregiver. (In clinic and on days that home care visits THURSDAYS also apply saline damp Hydrofera Blue cut to size of wound.)  Cover with Super Absorber or Silicone border. PATIENT CANNOT REFUSE TO HAVE WOUND CARE PERFORMED        Follow up with Mary Victoria CNP in 2 weeks in the wound care center. Call [phone] for any questions or concerns.         Treatment Note      Written Patient Dismissal Instructions Given            Electronically signed by PRECIOUS Yun CNP on 11/10/2022 at 10:17 AM

## 2022-12-01 ENCOUNTER — HOSPITAL ENCOUNTER (OUTPATIENT)
Dept: WOUND CARE | Age: 32
Discharge: HOME | End: 2022-12-01
Payer: COMMERCIAL

## 2022-12-01 VITALS
RESPIRATION RATE: 18 BRPM | DIASTOLIC BLOOD PRESSURE: 74 MMHG | SYSTOLIC BLOOD PRESSURE: 105 MMHG | TEMPERATURE: 97.8 F | HEART RATE: 85 BPM

## 2022-12-01 DIAGNOSIS — G82.20 PARAPLEGIA (HCC): ICD-10-CM

## 2022-12-01 DIAGNOSIS — L89.312 DECUBITUS ULCER OF RIGHT BUTTOCK, STAGE 2 (HCC): Primary | ICD-10-CM

## 2022-12-01 PROCEDURE — 97597 DBRDMT OPN WND 1ST 20 CM/<: CPT

## 2022-12-01 RX ORDER — CLOBETASOL PROPIONATE 0.5 MG/G
OINTMENT TOPICAL ONCE
OUTPATIENT
Start: 2022-12-01 | End: 2022-12-01

## 2022-12-01 RX ORDER — LIDOCAINE HYDROCHLORIDE 40 MG/ML
SOLUTION TOPICAL ONCE
OUTPATIENT
Start: 2022-12-01 | End: 2022-12-01

## 2022-12-01 RX ORDER — LIDOCAINE 50 MG/G
OINTMENT TOPICAL ONCE
OUTPATIENT
Start: 2022-12-01 | End: 2022-12-01

## 2022-12-01 RX ORDER — GINSENG 100 MG
CAPSULE ORAL ONCE
OUTPATIENT
Start: 2022-12-01 | End: 2022-12-01

## 2022-12-01 RX ORDER — BETAMETHASONE DIPROPIONATE 0.05 %
OINTMENT (GRAM) TOPICAL ONCE
OUTPATIENT
Start: 2022-12-01 | End: 2022-12-01

## 2022-12-01 RX ORDER — BACITRACIN, NEOMYCIN, POLYMYXIN B 400; 3.5; 5 [USP'U]/G; MG/G; [USP'U]/G
OINTMENT TOPICAL ONCE
OUTPATIENT
Start: 2022-12-01 | End: 2022-12-01

## 2022-12-01 RX ORDER — GENTAMICIN SULFATE 1 MG/G
OINTMENT TOPICAL ONCE
OUTPATIENT
Start: 2022-12-01 | End: 2022-12-01

## 2022-12-01 RX ORDER — LIDOCAINE 40 MG/G
CREAM TOPICAL ONCE
OUTPATIENT
Start: 2022-12-01 | End: 2022-12-01

## 2022-12-01 RX ORDER — BACITRACIN ZINC AND POLYMYXIN B SULFATE 500; 1000 [USP'U]/G; [USP'U]/G
OINTMENT TOPICAL ONCE
OUTPATIENT
Start: 2022-12-01 | End: 2022-12-01

## 2022-12-01 NOTE — DISCHARGE INSTRUCTIONS
PHYSICIAN ORDERS AND DISCHARGE INSTRUCTIONS     NOTE: Upon discharge from the [address], you will receive a patient experience survey. We would be grateful if you would take the time to fill this survey out. Continue home health care with:  [employer], supplies  Aide only not nursing  Fax [phone] [facility] Legal guardian [phone]     Wound cleansing:              Do not scrub or use excessive force. Wash hands with soap and water before and after dressing changes. Prior to applying a clean dressing, cleanse wound with normal saline, wound cleanser, or mild soap and water. Ask the physician or nurse before getting the wound(s) wet in a shower     Daily Wound management:              Keep weight off wounds and reposition every 2 hours. Avoid standing for long periods of time. Apply wraps/stockings in AM and remove at bedtime. If swelling is present, elevate legs to the level of the heart or above for 30 minutes 4-5 times a day and/or when sitting. When taking antibiotics take entire prescription as ordered by physician do not stop taking until medicine is all gone. Wound Care Notes:   Pressure reduction will facilitate healing, turn and reposition at least every 2 hours. Buttock wound cultured 5/21/21, 2/11/2022  Rx: Layo Segura in [place]                                Orders for this week: 12/1/2022     Please call client's caregiver Winsome to setup home visits to ensure she is present, visit after 9a ([phone])  [address] [place] [address],[facility]! Right Buttock - wash with soap and water, pat dry. Apply Desitin, Clobetasol and stimulen powder to wound bed. Apply twice daily by caregiver. Cover with Super Absorber or Silicone border.   PATIENT CANNOT REFUSE TO HAVE WOUND CARE PERFORMED Possible D/C in 2 weeks 12/1/22     Follow up with Karla Marsh CNP in 2 weeks in the wound care center. Call [phone] for any questions or concerns.

## 2022-12-01 NOTE — PROGRESS NOTES
Wound Care Center Progress Visit      Humphrey Rodas  AGE: 27 y.o. GENDER: female  : 1990  EPISODE DATE:  2022   Referred by: Self    Subjective:     CHIEF COMPLAINT WOUND RIGHT BUTTOCK/THIGH     HISTORY of PRESENT ILLNESS      Humphrey Rodas is a 27 y.o. female who presents to the [address] [place] [address] for an initial visit for evaluation and treatment of Acute on chronic pressure and moisture   ulcer of  right buttock/thigh. The condition is of moderate severity. The ulcer has been present for approximately 2  weeks at the wound clinic visit 22. The underlying cause is thought to be pressure and moisture. The patients care to date has included nothing. The patient has significant underlying medical conditions as below. Wound Pain Timing/Severity: intermittent, mild  Quality of pain: burning, tender  Severity of pain:  2 / 10   Modifying Factors: chronic pressure, decreased mobility, shear force, and obesity  Associated Signs/Symptoms: drainage and pain    KEVIN: as indicated base on wound location and assessment    Wound infection: wound culture will be obtained as needed for symptoms of infection. No local or system S&S infection    Arterial evaluation: if indicated based on wound, location, symptoms and healing    Venous Evaluation: if indicated based on wound, location, symptoms and healing    Diabetes: No  Smoking: Never smoker  Anticoagulant/Antiplatelet therapy: No  Immunosuppression: No  Obesity: Yes  Other History: Paraplegia    Patient educated on the 6 essential components necessary for wound healing: Circulation, Debridements, Proper Dressings and Topical Wound Products, Infection Control, Edema Control and Offloading.      Patient educated on those factors that negatively effect or impact wound healing: smoking, obesity, uncontrolled diabetes, anticoagulant and immunosuppressive regimens, inadequate nutrition, untreated arterial and venous disease if applicable and measures to manage edema. Nutritional status: well nourished. Discussed need for increased protein and calories for wound healing and good sources of protein (just over 7 grams for every 20 pounds of body weight). Animal-based foods high in protein (meat, poultry, fish, eggs, and dairy foods). Plant based foods high in protein (tofu, lentils, beans, chickpeas, nuts, quinoa and karl seeds. Off Loading  Offloading or minimizing or removing weight placed on an area with poor circulation such as diabetic wounds or pressure. This can be achieved with crutches, wheel chair, knee walker etc. Minimizing pressure through partial weight bearing (minimizing the amount of  pressure applied and or the amount of time on the area of pressure) or maintaining a non-weight bearing status can be used to promote and often can be essential for thee wound to heal. Off loading may also need to be achieved for non-weight bearing wounds such as pressure ulcers to the torso. Turning and changing positions frequently, at least every two hours. Use of pressure cushion if sitting up in chair. Skin Care  Keep skin clean and well moisturized , moisturize routinely with ointments for heavier moisturizer needs for extremely dry skin or cracks such as A&D ointment and lotions for a light moisturizer such as CeraVe or Eucerin. If incontinent change incontinence garments as soon as soiled and keeping skin clean and use barrier cream to protect the skin. Reduce Salt and Sodium  Choose low- or reduced- sodium, or no-salt-added versions of foods and condiments when available. Buy fresh, plain frozen, or canned with no-added-salt vegetables. Use fresh poultry, fish and lean meat, rather than canned, smoked or processed types. Choose ready-to-eat breakfast cereals that are lower in sodium. Limit cured foods (such as barnes and ham), foods packed in brine (such as pickled foods) and condiments (such as MSG, mustard, horseradish, and catsup).  Limit even lower sodium versions of soy sauce and teriyaki sauce-treat these condiments just like salt). In cooking and at the table, flavor foods with herbs, spices, lemon, lime, vinegar or salt-free seasoning blends. Start by cutting salt in half. Cook rice, pasta and hot cereals without salt. Cut back on instant or flavored rice, pasta and cereal mixes, which usually have added salt. Choose convenience foods that are lower in sodium. Limit frozen dinners, packaged mixes, canned soups and dressings. Rinse canned foods, such as tuna, to remove some sodium. Choose fruits or vegetables instead of salty snack foods. Edema Management if applicable  Whenever resting, raise your legs up. Try to keep the swollen area higher than the level of your heart. Take breaks from standing or sitting in one position. Walk around to increase the blood flow in your lower legs. Move your feet and ankles often while you stand, or tighten and relax your leg muscles. Wear support stockings. Put them on in the morning, before swelling gets worse. Eat a balanced diet. Lose weight if you need to. Limit the amount of salt (sodium) in your diet. Salt holds fluid in the body and may increase swelling. Apply compression stocking(s) every morning as soon as you get up. Remove at bedtime unless instructed to wear day and night. Hand wash and line dry to prevent loss of elasticity. Replace every 3-4 months to ensure proper fit. Weight Management if Applicable  Will need to ultimately change overall eating behaviors to have success with weight loss. Encouraged to weigh daily and work towards a goal of 1-2 pounds of weight loss weekly. Encouraged to journal all food intake, [employer] pal is a useful tool to help keep track of food intake and caloric value. Keep calorie level at approximately 7850-9398. Protein intake is to be a minimum of 60 grams per day (unless otherwise directed). Water drinking was encouraged with a goal of 64oz-128oz daily. file prior to encounter. PROBLEM LIST    Patient Active Problem List   Diagnosis    Nevus    Major depressive disorder    Hallucinations    WD-Paraplegia (HCC)    Altered mental status    Other insomnia    Anxiety    Acute gastroenteritis    WD-Decubitus ulcer of right buttock, stage 2 (HCC)       REVIEW OF SYSTEMS    Constitutional: negative for anorexia, chills, fatigue, fevers, malaise, sweats, and weight loss  Respiratory: negative for cough, dyspnea on exertion, hemoptysis, pleurisy/chest pain, shortness of breath, sputum, stridor, and wheezing  Cardiovascular: negative for chest pain, chest pressure/discomfort, dyspnea, exertional chest pressure/discomfort, irregular heart beat, near-syncope, orthopnea, palpitations, paroxysmal nocturnal dyspnea, syncope, and tachypnea  Integument/breast: positive for skin lesion(s)      Objective:      /74   Pulse 85   Temp 97.8 °F (36.6 °C) (Temporal)   Resp 18     PHYSICAL EXAM  General Appearance: alert and oriented to person, place and time, well-developed and well-nourished, in no acute distress  Pulmonary/Chest: clear to auscultation bilaterally- no wheezes, rales or rhonchi, normal air movement, no respiratory distress  Cardiovascular: normal rate, normal S1 and S2, no gallops, and intact distal pulses  Dermatologic exam: Visual inspection of the periwound reveals the skin to be moist  Wound exam: see wound description below in procedure note      Assessment:       Rubén Pompa  appears to have a non-healing wound of the right buttock/thigh. The etiology of the wound is felt to be pressure and moisture . There are multiple complicating factors including chronic pressure, decreased mobility, shear force, and obesity. A comprehensive wound management program would be helpful to heal this wound. Assessments completed include fall risk and nutritional, functional,and psychological status.   At this time appropriate care would include: periodic debridement and wound care as below. Problem List Items Addressed This Visit          Other    WD-Decubitus ulcer of right buttock, stage 2 ([address] - Primary    Relevant Orders    Initiate Outpatient Wound Care Protocol    WD-Paraplegia [facility])    Relevant Orders    Initiate Outpatient Wound Care Protocol       Procedure Note    Indications:  Based on my examination of this patient's wound(s) today, sharp excision into devitalized tissue is required to promote healing and evaluate the extent of previous healing. Performed by: PRECIOUS Beal - CNP    Consent obtained: Yes    Time out taken:  Yes    Pain Control: Anesthetic  Anesthetic: 4% Lidocaine Liquid Topical        Debridement:Excisional Debridement    Using curette the wound(s) was/were sharply debrided down through and including the removal of devitalized tissue        Devitalized Tissue Debrided:  fibrin, slough    Pre Debridement Measurements:  Are located in the Wound Documentation Flow Sheet    All active wounds listed below with today's date are evaluated  Wound(s)  debrided this date include # : 1     Post  Debridement Measurements:  Wound 08/01/22 #1 Right Buttock Cluster (Active)   Wound Image   12/01/22 1024   Wound Etiology Pressure Stage 2 12/01/22 1024   Dressing Status New dressing applied;Clean;Dry; Intact 11/10/22 1029   Wound Cleansed Wound cleanser 12/01/22 1024   Offloading for Diabetic Foot Ulcers Offloading not required 12/01/22 1024   Wound Length (cm) 11 cm 12/01/22 1024   Wound Width (cm) 8 cm 12/01/22 1024   Wound Depth (cm) 0.1 cm 12/01/22 1024   Wound Surface Area (cm^2) 88 cm^2 12/01/22 1024   Change in Wound Size % (l*w) 2.22 12/01/22 1024   Wound Volume (cm^3) 8.8 cm^3 12/01/22 1024   Wound Healing % 2 12/01/22 1024   Post-Procedure Length (cm) 11 cm 12/01/22 1049   Post-Procedure Width (cm) 8 cm 12/01/22 1049   Post-Procedure Depth (cm) 0.1 cm 12/01/22 1049   Post-Procedure Surface Area (cm^2) 88 cm^2 12/01/22 1049 Post-Procedure Volume (cm^3) 8.8 cm^3 12/01/22 1049   Distance Tunneling (cm) 0 cm 12/01/22 1024   Tunneling Position ___ O'Clock 0 12/01/22 1024   Undermining Starts ___ O'Clock 0 12/01/22 1024   Undermining Ends___ O'Clock 0 12/01/22 1024   Undermining Maxium Distance (cm) 0 12/01/22 1024   Wound Assessment Pink/red;Dry 12/01/22 1024   Drainage Amount None 12/01/22 1024   Drainage Description Serosanguinous 10/20/22 1022   Odor None 12/01/22 1024   Dyan-wound Assessment Excoriated;Dry/flaky 12/01/22 1024   Margins Undefined edges 12/01/22 1024   Wound Thickness Description not for Pressure Injury Full thickness 12/01/22 1024   Number of days: 121     Total  Area  Debrided:  10 sq cm     Bleeding:  Minimal    Hemostasis Achieved:  by pressure    Procedural Pain:  0  / 10     Post Procedural Pain:  0 / 10     Response to treatment:  Well tolerated by patient. Status of wound: Recurrent pressure and moisture ulcer, much improved since the last time I saw her. Regimen as below, Home Health in place. Reinforced off loading and skin care as above with Pat her CG. Follow up in 2 weeks. Care Team/History: She in the past has been followed by Dr. Andria Reed but has been coming to this clinic for the last few visit. The patient lives at [facility], she has assistance from supporting independence and has a caregiver Shannandania Tavares [facility] [phone]) with her today that cares for her Monday-Thursday (9am-5pm). She has had Home Health services through [facility] that recently discharged her because she was not letting them change the dressing after multiple visit. The CG as above is not allowed to perform advanced dressing changes. Rich Yoo from [facility] the patient frequently has saturated incontinence garments when she had been there. The spoke with Cynthia Grider ([phone])  with supporting [employer], we discussed above and she verbalizes the patient often does refuse incontinence garment changes and they are not allowed to force her to get changed. She verbalizes Nitza Solis [phone] [address] was who should be contacted to discuss the case with. She as well verbalizes they can't force the patient to get routine wound or incontinence care. A message was left with legal guardian Fransisca Traore to call me about the patient. Plan:     Discharge instructions:    Discharge Instructions         PHYSICIAN ORDERS AND DISCHARGE INSTRUCTIONS     NOTE: Upon discharge from the [address], you will receive a patient experience survey. We would be grateful if you would take the time to fill this survey out. Continue home health care with:  [employer], supplies  Aide only not nursing  Fax [phone] [facility] Legal guardian [phone]     Wound cleansing:              Do not scrub or use excessive force. Wash hands with soap and water before and after dressing changes. Prior to applying a clean dressing, cleanse wound with normal saline, wound cleanser, or mild soap and water. Ask the physician or nurse before getting the wound(s) wet in a shower     Daily Wound management:              Keep weight off wounds and reposition every 2 hours. Avoid standing for long periods of time. Apply wraps/stockings in AM and remove at bedtime. If swelling is present, elevate legs to the level of the heart or above for 30 minutes 4-5 times a day and/or when sitting. When taking antibiotics take entire prescription as ordered by physician do not stop taking until medicine is all gone. Wound Care Notes:   Pressure reduction will facilitate healing, turn and reposition at least every 2 hours.   Buttock wound cultured 5/21/21, 2/11/2022  Rx: Layo Segura in [place] Orders for this week: 12/1/2022     Please call client's caregiver Winsome to setup home visits to ensure she is present, visit after 9a ([phone])  FAX ORDERS TO [facility]! Right Buttock - wash with soap and water, pat dry. Apply Desitin, Clobetasol and stimulen powder to wound bed. Apply twice daily by caregiver. Cover with Super Absorber or Silicone border. PATIENT CANNOT REFUSE TO HAVE WOUND CARE PERFORMED   Possible D/C in 2 weeks 12/1/22     Follow up with Kelly Taylor CNP in 2 weeks in the wound care center. Call [phone] for any questions or concerns.         Treatment Note      Written Patient Dismissal Instructions Given            Electronically signed by PRECIOUS Martinez CNP on 12/1/2022 at 10:58 AM

## 2022-12-14 NOTE — DISCHARGE INSTRUCTIONS
PHYSICIAN ORDERS AND DISCHARGE INSTRUCTIONS     NOTE: Upon discharge from the [address], you will receive a patient experience survey. We would be grateful if you would take the time to fill this survey out. Continue home health care with:  [employer], supplies  Aide only not nursing  Fax [phone] [facility] Legal guardian [phone]     Wound cleansing:              Do not scrub or use excessive force. Wash hands with soap and water before and after dressing changes. Prior to applying a clean dressing, cleanse wound with normal saline, wound cleanser, or mild soap and water. Ask the physician or nurse before getting the wound(s) wet in a shower     Daily Wound management:              Keep weight off wounds and reposition every 2 hours. Avoid standing for long periods of time. Apply wraps/stockings in AM and remove at bedtime. If swelling is present, elevate legs to the level of the heart or above for 30 minutes 4-5 times a day and/or when sitting. When taking antibiotics take entire prescription as ordered by physician do not stop taking until medicine is all gone. Wound Care Notes:   Pressure reduction will facilitate healing, turn and reposition at least every 2 hours. Buttock wound cultured 5/21/21, 2/11/2022  Rx: Bear [facility] in [place]                                Orders for this week: 12/15/2022     Please call client's caregiver Winsome to setup home visits to ensure she is present, visit after 9a ([phone])  [address] [place] [address],[facility]! Right Buttock - wash with soap and water, pat dry. Apply Desitin, Clobetasol and stimulen powder to wound bed. Apply twice daily by caregiver. Cover with Super Absorber or Silicone border.   PATIENT CANNOT REFUSE TO HAVE WOUND CARE PERFORMED Possible D/C in 2 weeks 12/1/22     Follow up with Chantel De Jesus CNP in 2 weeks in the wound care center. Call [phone] for any questions or concerns.

## 2022-12-15 ENCOUNTER — HOSPITAL ENCOUNTER (OUTPATIENT)
Dept: WOUND CARE | Age: 32
Discharge: HOME | End: 2022-12-15

## 2022-12-21 NOTE — DISCHARGE INSTRUCTIONS
PHYSICIAN ORDERS AND DISCHARGE INSTRUCTIONS     NOTE: Upon discharge from the [address], you will receive a patient experience survey. We would be grateful if you would take the time to fill this survey out. Continue home health care with:  [employer], supplies  Aide only not nursing  Fax [phone] [facility] Legal guardian [phone]     Wound cleansing:              Do not scrub or use excessive force. Wash hands with soap and water before and after dressing changes. Prior to applying a clean dressing, cleanse wound with normal saline, wound cleanser, or mild soap and water. Ask the physician or nurse before getting the wound(s) wet in a shower     Daily Wound management:              Keep weight off wounds and reposition every 2 hours. Avoid standing for long periods of time. Apply wraps/stockings in AM and remove at bedtime. If swelling is present, elevate legs to the level of the heart or above for 30 minutes 4-5 times a day and/or when sitting. When taking antibiotics take entire prescription as ordered by physician do not stop taking until medicine is all gone. Wound Care Notes:   Pressure reduction will facilitate healing, turn and reposition at least every 2 hours. Buttock wound cultured 5/21/21, 2/11/2022  Rx: Layo Segura in [place]                                Orders for this week: 12/22/2022     Please call client's caregiver Winsome to setup home visits to ensure she is present, visit after 2s ([phone])  [address] [place] [address],[facility]! Right Buttock - wash with soap and water, pat dry. Apply Desitin, Clobetasol and stimulen powder to wound bed. Apply twice daily by caregiver. Cover with Super Absorber or Silicone border.   PATIENT CANNOT REFUSE TO HAVE WOUND CARE PERFORMED       Follow up with Tresa Anderson CNP  in the wound care center. If needed. Discharged   Call  for any questions or concerns.

## 2022-12-22 ENCOUNTER — HOSPITAL ENCOUNTER (OUTPATIENT)
Dept: WOUND CARE | Age: 32
Discharge: HOME | End: 2022-12-22
Payer: COMMERCIAL

## 2022-12-22 VITALS
TEMPERATURE: 97.2 F | DIASTOLIC BLOOD PRESSURE: 79 MMHG | RESPIRATION RATE: 18 BRPM | HEART RATE: 53 BPM | SYSTOLIC BLOOD PRESSURE: 111 MMHG

## 2022-12-22 DIAGNOSIS — G82.20 PARAPLEGIA (HCC): ICD-10-CM

## 2022-12-22 DIAGNOSIS — L89.312 DECUBITUS ULCER OF RIGHT BUTTOCK, STAGE 2 (HCC): Primary | ICD-10-CM

## 2022-12-22 PROCEDURE — 99213 OFFICE O/P EST LOW 20 MIN: CPT

## 2022-12-22 RX ORDER — BACITRACIN ZINC AND POLYMYXIN B SULFATE 500; 1000 [USP'U]/G; [USP'U]/G
OINTMENT TOPICAL ONCE
Status: CANCELLED | OUTPATIENT
Start: 2022-12-22 | End: 2022-12-22

## 2022-12-22 RX ORDER — LIDOCAINE HYDROCHLORIDE 40 MG/ML
SOLUTION TOPICAL ONCE
Status: CANCELLED | OUTPATIENT
Start: 2022-12-22 | End: 2022-12-22

## 2022-12-22 RX ORDER — LIDOCAINE 40 MG/G
CREAM TOPICAL ONCE
Status: CANCELLED | OUTPATIENT
Start: 2022-12-22 | End: 2022-12-22

## 2022-12-22 RX ORDER — GINSENG 100 MG
CAPSULE ORAL ONCE
Status: CANCELLED | OUTPATIENT
Start: 2022-12-22 | End: 2022-12-22

## 2022-12-22 RX ORDER — BETAMETHASONE DIPROPIONATE 0.05 %
OINTMENT (GRAM) TOPICAL ONCE
Status: CANCELLED | OUTPATIENT
Start: 2022-12-22 | End: 2022-12-22

## 2022-12-22 RX ORDER — LIDOCAINE 50 MG/G
OINTMENT TOPICAL ONCE
Status: CANCELLED | OUTPATIENT
Start: 2022-12-22 | End: 2022-12-22

## 2022-12-22 RX ORDER — BACITRACIN, NEOMYCIN, POLYMYXIN B 400; 3.5; 5 [USP'U]/G; MG/G; [USP'U]/G
OINTMENT TOPICAL ONCE
Status: CANCELLED | OUTPATIENT
Start: 2022-12-22 | End: 2022-12-22

## 2022-12-22 RX ORDER — CLOBETASOL PROPIONATE 0.5 MG/G
OINTMENT TOPICAL ONCE
Status: CANCELLED | OUTPATIENT
Start: 2022-12-22 | End: 2022-12-22

## 2022-12-22 RX ORDER — GENTAMICIN SULFATE 1 MG/G
OINTMENT TOPICAL ONCE
Status: CANCELLED | OUTPATIENT
Start: 2022-12-22 | End: 2022-12-22

## 2022-12-22 NOTE — PROGRESS NOTES
Treatment to Right Buttock: Applied Desitin, Clobetasol, and Stimulen powder to wound. Covered with Sacral Border. Patient tolerated well.

## 2022-12-22 NOTE — PROGRESS NOTES
Wound Care Center Progress Visit      Lexie Hernandez  AGE: 27 y.o. GENDER: female  : 1990  EPISODE DATE:  2022   Referred by: Self    Subjective:     CHIEF COMPLAINT WOUND RIGHT BUTTOCK/THIGH     HISTORY of PRESENT ILLNESS      Lexie Hernandez is a 27 y.o. female who presents to the [address] [place] [address] for an initial visit for evaluation and treatment of Acute on chronic pressure and moisture   ulcer of  right buttock/thigh. The condition is of moderate severity. The ulcer has been present for approximately 2  weeks at the wound clinic visit 22. The underlying cause is thought to be pressure and moisture. The patients care to date has included nothing. The patient has significant underlying medical conditions as below. Wound Pain Timing/Severity: intermittent, mild  Quality of pain: burning, tender  Severity of pain:  2 / 10   Modifying Factors: chronic pressure, decreased mobility, shear force, and obesity  Associated Signs/Symptoms: drainage and pain    KEVIN: as indicated base on wound location and assessment    Wound infection: wound culture will be obtained as needed for symptoms of infection. No local or system S&S infection    Arterial evaluation: if indicated based on wound, location, symptoms and healing    Venous Evaluation: if indicated based on wound, location, symptoms and healing    Diabetes: No  Smoking: Never smoker  Anticoagulant/Antiplatelet therapy: No  Immunosuppression: No  Obesity: Yes  Other History: Paraplegia    Patient educated on the 6 essential components necessary for wound healing: Circulation, Debridements, Proper Dressings and Topical Wound Products, Infection Control, Edema Control and Offloading.      Patient educated on those factors that negatively effect or impact wound healing: smoking, obesity, uncontrolled diabetes, anticoagulant and immunosuppressive regimens, inadequate nutrition, untreated arterial and venous disease if applicable and measures to manage edema. Nutritional status: well nourished. Discussed need for increased protein and calories for wound healing and good sources of protein (just over 7 grams for every 20 pounds of body weight). Animal-based foods high in protein (meat, poultry, fish, eggs, and dairy foods). Plant based foods high in protein (tofu, lentils, beans, chickpeas, nuts, quinoa and karl seeds. Off Loading  Offloading or minimizing or removing weight placed on an area with poor circulation such as diabetic wounds or pressure. This can be achieved with crutches, wheel chair, knee walker etc. Minimizing pressure through partial weight bearing (minimizing the amount of  pressure applied and or the amount of time on the area of pressure) or maintaining a non-weight bearing status can be used to promote and often can be essential for thee wound to heal. Off loading may also need to be achieved for non-weight bearing wounds such as pressure ulcers to the torso. Turning and changing positions frequently, at least every two hours. Use of pressure cushion if sitting up in chair. Skin Care  Keep skin clean and well moisturized , moisturize routinely with ointments for heavier moisturizer needs for extremely dry skin or cracks such as A&D ointment and lotions for a light moisturizer such as CeraVe or Eucerin. If incontinent change incontinence garments as soon as soiled and keeping skin clean and use barrier cream to protect the skin. Reduce Salt and Sodium  Choose low- or reduced- sodium, or no-salt-added versions of foods and condiments when available. Buy fresh, plain frozen, or canned with no-added-salt vegetables. Use fresh poultry, fish and lean meat, rather than canned, smoked or processed types. Choose ready-to-eat breakfast cereals that are lower in sodium. Limit cured foods (such as barnes and ham), foods packed in brine (such as pickled foods) and condiments (such as MSG, mustard, horseradish, and catsup).  Limit even lower sodium versions of soy sauce and teriyaki sauce-treat these condiments just like salt). In cooking and at the table, flavor foods with herbs, spices, lemon, lime, vinegar or salt-free seasoning blends. Start by cutting salt in half. Cook rice, pasta and hot cereals without salt. Cut back on instant or flavored rice, pasta and cereal mixes, which usually have added salt. Choose convenience foods that are lower in sodium. Limit frozen dinners, packaged mixes, canned soups and dressings. Rinse canned foods, such as tuna, to remove some sodium. Choose fruits or vegetables instead of salty snack foods. Edema Management if applicable  Whenever resting, raise your legs up. Try to keep the swollen area higher than the level of your heart. Take breaks from standing or sitting in one position. Walk around to increase the blood flow in your lower legs. Move your feet and ankles often while you stand, or tighten and relax your leg muscles. Wear support stockings. Put them on in the morning, before swelling gets worse. Eat a balanced diet. Lose weight if you need to. Limit the amount of salt (sodium) in your diet. Salt holds fluid in the body and may increase swelling. Apply compression stocking(s) every morning as soon as you get up. Remove at bedtime unless instructed to wear day and night. Hand wash and line dry to prevent loss of elasticity. Replace every 3-4 months to ensure proper fit. Weight Management if Applicable  Will need to ultimately change overall eating behaviors to have success with weight loss. Encouraged to weigh daily and work towards a goal of 1-2 pounds of weight loss weekly. Encouraged to journal all food intake, [employer] pal is a useful tool to help keep track of food intake and caloric value. Keep calorie level at approximately 9055-4565. Protein intake is to be a minimum of 60 grams per day (unless otherwise directed). Water drinking was encouraged with a goal of 64oz-128oz daily. Beverages to be calorie free except for milk. Every other beverage should be water, avoid soda. Continue to increase level of physical activity. Refer to weight management as indicated and requested by patient. PAST MEDICAL HISTORY        Diagnosis Date    Anxiety     Back pain     Depression     Encephalitis     Hypertension     Meningitis     Paraplegia (HCC)     Suicidal ideation     Urinary incontinence        PAST SURGICAL HISTORY    Past Surgical History:   Procedure Laterality Date    PRE-MALIGNANT / BENIGN SKIN LESION EXCISION      2015 left shoulder       FAMILY HISTORY    Family History   Family history unknown: Yes       SOCIAL HISTORY    Social History     Tobacco Use    Smoking status: Never    Smokeless tobacco: Never   Vaping Use    Vaping Use: Never used   Substance Use Topics    Alcohol use: No    Drug use: No       ALLERGIES    Allergies   Allergen Reactions    Fish-Derived Products     Lemon Extract [Flavoring Agent]     Milk-Related Compounds     Trazodone And Nefazodone      Causes nightmares         MEDICATIONS    Current Outpatient Medications on File Prior to Encounter   Medication Sig Dispense Refill    busPIRone (BUSPAR) 5 MG tablet Take 5 mg by mouth 3 times daily      nystatin (MYCOSTATIN) 800439 UNIT/ML suspension Take 5 mLs by mouth 4 times daily Swish and swallow 5 ml, 4 times a day, 8am, 12pm, 4pm, 8pm. 140 mL 0    citalopram (CELEXA) 20 MG tablet Take 1 tablet by mouth daily (Patient taking differently: Take 30 mg by mouth daily) 30 tablet 0    hydrOXYzine (VISTARIL) 25 MG capsule Take 1 capsule by mouth 2 times daily 60 capsule 3    white petrolatum (VASELINE) GEL Apply 28 g topically as needed for Dry Skin 212 g 3    ibuprofen (ADVIL;MOTRIN) 800 MG tablet Take 1 tablet by mouth every 6 hours as needed for Pain 120 tablet 1    Diapers & Supplies MISC XL diapers. Patient prefers ones that has sticky tabs on sides since she is a paraplegic. Change diaper every 4 hours.  120 each 12     No current facility-administered medications on file prior to encounter. PROBLEM LIST    Patient Active Problem List   Diagnosis    Nevus    Major depressive disorder    Hallucinations    WD-Paraplegia (HCC)    Altered mental status    Other insomnia    Anxiety    Acute gastroenteritis    WD-Decubitus ulcer of right buttock, stage 2 (HCC)       REVIEW OF SYSTEMS    Constitutional: negative for anorexia, chills, fatigue, fevers, malaise, sweats, and weight loss  Respiratory: negative for cough, dyspnea on exertion, hemoptysis, pleurisy/chest pain, shortness of breath, sputum, stridor, and wheezing  Cardiovascular: negative for chest pain, chest pressure/discomfort, dyspnea, exertional chest pressure/discomfort, irregular heart beat, near-syncope, orthopnea, palpitations, paroxysmal nocturnal dyspnea, syncope, and tachypnea  Integument/breast: positive for skin lesion(s)      Objective:      /79   Pulse 53   Temp 97.2 °F (36.2 °C) (Temporal)   Resp 18     PHYSICAL EXAM  General Appearance: alert and oriented to person, place and time, well-developed and well-nourished, in no acute distress  Pulmonary/Chest: clear to auscultation bilaterally- no wheezes, rales or rhonchi, normal air movement, no respiratory distress  Cardiovascular: normal rate, normal S1 and S2, no gallops, and intact distal pulses  Dermatologic exam: Visual inspection of the periwound reveals the skin to be moist  Wound exam: see wound description below in procedure note      Assessment:       Anya Loaiza  appears to have a non-healing wound of the right buttock/thigh. The etiology of the wound is felt to be pressure and moisture . There are multiple complicating factors including chronic pressure, decreased mobility, shear force, and obesity. A comprehensive wound management program would be helpful to heal this wound. Assessments completed include fall risk and nutritional, functional,and psychological status.   At this time appropriate care would include: periodic debridement and wound care as below. Problem List Items Addressed This Visit          Other    WD-Decubitus ulcer of right buttock, stage 2 ([address] - Primary    Relevant Orders    Initiate Outpatient Wound Care Protocol    WD-Paraplegia [facility])    Relevant Orders    Initiate Outpatient Wound Care Protocol     Status of wound: Recurrent pressure and moisture ulcer, healed today. Regimen as below, Home Health in place. Reinforced off loading and skin care as above with Pat her CG. Will discharge at this time, to follow up if any concerns. Care Team/History: She in the past has been followed by Dr. Marychuy Pearson but has been coming to this clinic for the last few visit. The patient lives at [facility], she has assistance from [employer] and has a caregiver Radha Vogelilling [phone]) with her today that cares for her Monday-Thursday (9am-5pm). She has had Home Health services through [facility] that recently discharged her because she was not letting them change the dressing after multiple visit. The CG as above is not allowed to perform advanced dressing changes. Raghav Winter from [address] [place] [address] the patient frequently has saturated incontinence garments when she had been there. The spoke with Jamie Walker ([phone])  with [employer], we discussed above and she verbalizes the patient often does refuse incontinence garment changes and they are not allowed to force her to get changed. She verbalizes Manju Hernandez [phone] [address] [place] [address] was who should be contacted to discuss the case with. She as well verbalizes they can't force the patient to get routine wound or incontinence care. A message was left with legal guardian Yunior Soni to call me about the patient.    Plan:     Discharge instructions:    Discharge Instructions         PHYSICIAN ORDERS AND DISCHARGE INSTRUCTIONS     NOTE: Upon discharge from the [address] [facility], you will receive a patient experience survey. We would be grateful if you would take the time to fill this survey out. Continue home health care with:  [employer], supplies  Aide only not nursing  Fax [phone] [facility] Legal guardian [phone]     Wound cleansing:              Do not scrub or use excessive force. Wash hands with soap and water before and after dressing changes. Prior to applying a clean dressing, cleanse wound with normal saline, wound cleanser, or mild soap and water. Ask the physician or nurse before getting the wound(s) wet in a shower     Daily Wound management:              Keep weight off wounds and reposition every 2 hours. Avoid standing for long periods of time. Apply wraps/stockings in AM and remove at bedtime. If swelling is present, elevate legs to the level of the heart or above for 30 minutes 4-5 times a day and/or when sitting. When taking antibiotics take entire prescription as ordered by physician do not stop taking until medicine is all gone. Wound Care Notes:   Pressure reduction will facilitate healing, turn and reposition at least every 2 hours. Buttock wound cultured 5/21/21, 2/11/2022  Rx: Bear Imelda in [place]                                Orders for this week: 12/22/2022     Please call client's caregiver Winsome to setup home visits to ensure she is present, visit after 9a ([phone])  [address] [place] [address],[facility]! Right Buttock - wash with soap and water, pat dry. Apply Desitin, Clobetasol and stimulen powder to wound bed. Apply twice daily by caregiver. Cover with Super Absorber or Silicone border.   PATIENT CANNOT REFUSE TO HAVE WOUND CARE PERFORMED       Follow up with Alisia Mckay CNP  in the wound care [place]. If needed. Discharged   Call  for any questions or concerns.         Treatment Note      Written Patient Dismissal Instructions Given            Electronically signed by PRECIOUS Armenta CNP on 12/22/2022 at 10:32 AM

## 2023-11-06 ENCOUNTER — HOSPITAL ENCOUNTER (OUTPATIENT)
Dept: WOUND CARE | Age: 33
Discharge: HOME | End: 2023-11-06
Payer: COMMERCIAL

## 2023-11-06 VITALS — TEMPERATURE: 99 F | RESPIRATION RATE: 16 BRPM

## 2023-11-06 DIAGNOSIS — L89.312 DECUBITUS ULCER OF RIGHT BUTTOCK, STAGE 2 (HCC): ICD-10-CM

## 2023-11-06 DIAGNOSIS — G82.20 PARAPLEGIA (HCC): Primary | ICD-10-CM

## 2023-11-06 DIAGNOSIS — T14.8XXA EXCORIATION: ICD-10-CM

## 2023-11-06 PROCEDURE — 99213 OFFICE O/P EST LOW 20 MIN: CPT

## 2023-11-06 RX ORDER — IBUPROFEN 200 MG
TABLET ORAL ONCE
OUTPATIENT
Start: 2023-11-06 | End: 2023-11-06

## 2023-11-06 RX ORDER — TRIAMCINOLONE ACETONIDE 1 MG/G
OINTMENT TOPICAL ONCE
OUTPATIENT
Start: 2023-11-06 | End: 2023-11-06

## 2023-11-06 RX ORDER — SODIUM CHLOR/HYPOCHLOROUS ACID 0.033 %
SOLUTION, IRRIGATION IRRIGATION ONCE
OUTPATIENT
Start: 2023-11-06 | End: 2023-11-06

## 2023-11-06 RX ORDER — CLOBETASOL PROPIONATE 0.5 MG/G
OINTMENT TOPICAL ONCE
OUTPATIENT
Start: 2023-11-06 | End: 2023-11-06

## 2023-11-06 RX ORDER — BETAMETHASONE DIPROPIONATE 0.5 MG/G
CREAM TOPICAL ONCE
OUTPATIENT
Start: 2023-11-06 | End: 2023-11-06

## 2023-11-06 RX ORDER — GENTAMICIN SULFATE 1 MG/G
OINTMENT TOPICAL ONCE
OUTPATIENT
Start: 2023-11-06 | End: 2023-11-06

## 2023-11-06 RX ORDER — BACITRACIN ZINC AND POLYMYXIN B SULFATE 500; 1000 [USP'U]/G; [USP'U]/G
OINTMENT TOPICAL ONCE
OUTPATIENT
Start: 2023-11-06 | End: 2023-11-06

## 2023-11-06 RX ORDER — LIDOCAINE 50 MG/G
OINTMENT TOPICAL ONCE
OUTPATIENT
Start: 2023-11-06 | End: 2023-11-06

## 2023-11-06 RX ORDER — LIDOCAINE 40 MG/G
CREAM TOPICAL ONCE
OUTPATIENT
Start: 2023-11-06 | End: 2023-11-06

## 2023-11-06 RX ORDER — GINSENG 100 MG
CAPSULE ORAL ONCE
OUTPATIENT
Start: 2023-11-06 | End: 2023-11-06

## 2023-11-06 RX ORDER — LIDOCAINE HYDROCHLORIDE 40 MG/ML
SOLUTION TOPICAL ONCE
OUTPATIENT
Start: 2023-11-06 | End: 2023-11-06

## 2023-11-06 NOTE — PROGRESS NOTES
elasticity. Replace every 3-4 months to ensure proper fit. Weight Management   Will need to ultimately change overall eating behaviors to have success with weight loss. Encouraged to weigh daily and work towards a goal of 1-2 pounds of weight loss weekly. Encouraged to journal all food intake, [employer] pal is a useful tool to help keep track of food intake and caloric value. Keep calorie level at approximately 0238-1938. Protein intake is to be a minimum of 60 grams per day (unless otherwise directed). Water drinking was encouraged with a goal of 64oz-128oz daily. Beverages to be calorie free except for milk. Every other beverage should be water, avoid soda. Continue to increase level of physical activity. Refer to weight management as indicated and requested by patient.           PAST MEDICAL HISTORY        Diagnosis Date    Anxiety     Back pain     Depression     Encephalitis     Hypertension     Meningitis     Paraplegia (HCC)     Suicidal ideation     Urinary incontinence        PAST SURGICAL HISTORY    Past Surgical History:   Procedure Laterality Date    PRE-MALIGNANT / BENIGN SKIN LESION EXCISION      2015 left shoulder       FAMILY HISTORY    Family History   Family history unknown: Yes       SOCIAL HISTORY    Social History     Tobacco Use    Smoking status: Never    Smokeless tobacco: Never   Vaping Use    Vaping Use: Never used   Substance Use Topics    Alcohol use: No    Drug use: No       ALLERGIES    Allergies   Allergen Reactions    Fish-Derived Products     Lemon Extract [Flavoring Agent]     Milk-Related Compounds     Trazodone And Nefazodone      Causes nightmares         MEDICATIONS    Current Outpatient Medications on File Prior to Encounter   Medication Sig Dispense Refill    busPIRone (BUSPAR) 5 MG tablet Take 5 mg by mouth 3 times daily      nystatin (MYCOSTATIN) 859275 UNIT/ML suspension Take 5 mLs by mouth 4 times daily Swish and swallow 5 ml, 4 times a day, 8am, 12pm, 4pm, 8pm. 140 mL

## 2023-11-06 NOTE — PATIENT INSTRUCTIONS
PHYSICIAN ORDERS AND DISCHARGE INSTRUCTIONS    Wound cleansing:     Do not scrub or use excessive force. Wash hands with soap and water before and after dressing changes. Prior to applying a clean dressing, cleanse wound with normal saline,               wound cleanser, or mild soap and water. Ask the physician or nurse before getting the wound(s) wet in a shower    Daily Wound management:   Keep weight off wounds and reposition every 2 hours. Avoid standing for long periods of time. Apply wraps/stockings in AM and remove at bedtime. If swelling is present, elevate legs to the level of the heart or above for              30 minutes 4-5 times a day and/or when sitting. When taking antibiotics take entire prescription as ordered by              physician do not stop taking until medicine is all gone. Wound Care Notes:  Compression:   Offloading:   Imaging:   Cultures:             KEVIN:  Wound Care Supplies:   [facility]:   Rx:          Orders for this week:  2023        Buttock wounds   :Wash with soap and water. Pat dry. Apply desitin, stimulen powder and clobetasol  to excoriated areas daily     Dispense 30 day quantity when ordering supplies. []   Nurse Visit:   Follow Up Instructions: At the [address] [place] [address] in 1 week   Primary Wound Care Provider: Kyle Ambrocio CNP   Call  for any questions or concerns.   Central [phone] for imaging and lab work

## 2023-11-14 ENCOUNTER — HOSPITAL ENCOUNTER (OUTPATIENT)
Dept: WOUND CARE | Age: 33
Discharge: HOME | End: 2023-11-14
Payer: COMMERCIAL

## 2023-11-14 VITALS
DIASTOLIC BLOOD PRESSURE: 71 MMHG | TEMPERATURE: 97.5 F | SYSTOLIC BLOOD PRESSURE: 113 MMHG | HEART RATE: 62 BPM | RESPIRATION RATE: 16 BRPM

## 2023-11-14 DIAGNOSIS — L89.312 DECUBITUS ULCER OF RIGHT BUTTOCK, STAGE 2 (HCC): Primary | ICD-10-CM

## 2023-11-14 DIAGNOSIS — T14.8XXA EXCORIATION: ICD-10-CM

## 2023-11-14 DIAGNOSIS — G82.20 PARAPLEGIA (HCC): ICD-10-CM

## 2023-11-14 PROCEDURE — 99213 OFFICE O/P EST LOW 20 MIN: CPT | Performed by: NURSE PRACTITIONER

## 2023-11-14 PROCEDURE — 99213 OFFICE O/P EST LOW 20 MIN: CPT

## 2023-11-14 RX ORDER — TRIAMCINOLONE ACETONIDE 1 MG/G
OINTMENT TOPICAL ONCE
Status: CANCELLED | OUTPATIENT
Start: 2023-11-14 | End: 2023-11-14

## 2023-11-14 RX ORDER — BACITRACIN ZINC AND POLYMYXIN B SULFATE 500; 1000 [USP'U]/G; [USP'U]/G
OINTMENT TOPICAL ONCE
Status: CANCELLED | OUTPATIENT
Start: 2023-11-14 | End: 2023-11-14

## 2023-11-14 RX ORDER — LIDOCAINE 40 MG/G
CREAM TOPICAL ONCE
Status: CANCELLED | OUTPATIENT
Start: 2023-11-14 | End: 2023-11-14

## 2023-11-14 RX ORDER — CLOBETASOL PROPIONATE 0.5 MG/G
OINTMENT TOPICAL ONCE
Status: CANCELLED | OUTPATIENT
Start: 2023-11-14 | End: 2023-11-14

## 2023-11-14 RX ORDER — SODIUM CHLOR/HYPOCHLOROUS ACID 0.033 %
SOLUTION, IRRIGATION IRRIGATION ONCE
Status: CANCELLED | OUTPATIENT
Start: 2023-11-14 | End: 2023-11-14

## 2023-11-14 RX ORDER — GINSENG 100 MG
CAPSULE ORAL ONCE
Status: CANCELLED | OUTPATIENT
Start: 2023-11-14 | End: 2023-11-14

## 2023-11-14 RX ORDER — LIDOCAINE 50 MG/G
OINTMENT TOPICAL ONCE
Status: CANCELLED | OUTPATIENT
Start: 2023-11-14 | End: 2023-11-14

## 2023-11-14 RX ORDER — GENTAMICIN SULFATE 1 MG/G
OINTMENT TOPICAL ONCE
Status: CANCELLED | OUTPATIENT
Start: 2023-11-14 | End: 2023-11-14

## 2023-11-14 RX ORDER — BETAMETHASONE DIPROPIONATE 0.5 MG/G
CREAM TOPICAL ONCE
Status: CANCELLED | OUTPATIENT
Start: 2023-11-14 | End: 2023-11-14

## 2023-11-14 RX ORDER — LIDOCAINE HYDROCHLORIDE 40 MG/ML
SOLUTION TOPICAL ONCE
Status: CANCELLED | OUTPATIENT
Start: 2023-11-14 | End: 2023-11-14

## 2023-11-14 RX ORDER — IBUPROFEN 200 MG
TABLET ORAL ONCE
Status: CANCELLED | OUTPATIENT
Start: 2023-11-14 | End: 2023-11-14

## 2023-11-14 NOTE — PATIENT INSTRUCTIONS
PHYSICIAN ORDERS AND DISCHARGE INSTRUCTIONS    Wound cleansing:     Do not scrub or use excessive force. Wash hands with soap and water before and after dressing changes. Prior to applying a clean dressing, cleanse wound with normal saline,               wound cleanser, or mild soap and water. Ask the physician or nurse before getting the wound(s) wet in a shower    Daily Wound management:   Keep weight off wounds and reposition every 2 hours. Avoid standing for long periods of time. Apply wraps/stockings in AM and remove at bedtime. If swelling is present, elevate legs to the level of the heart or above for              30 minutes 4-5 times a day and/or when sitting. When taking antibiotics take entire prescription as ordered by              physician do not stop taking until medicine is all gone. Wound Care Notes:  Compression:   Offloading:   Imaging:   Cultures:             KEVIN:  Wound Care Supplies:   [facility]:   Rx:          Orders for this week:  2023        Buttock wounds   :Wash with soap and water. Pat dry. Apply desitin, stimulen powder and clobetasol  to excoriated areas daily     Dispense 30 day quantity when ordering supplies. []   Nurse Visit:   Follow Up Instructions: At the [address] [place] [address] : Discharged   Primary Wound Care Provider: Kristine Butts CNP   Call  for any questions or concerns.   Central [phone] for imaging and lab work

## 2024-01-08 ENCOUNTER — HOSPITAL ENCOUNTER (OUTPATIENT)
Dept: WOUND CARE | Age: 34
Discharge: HOME | End: 2024-01-08
Payer: COMMERCIAL

## 2024-01-08 VITALS
HEART RATE: 86 BPM | RESPIRATION RATE: 16 BRPM | TEMPERATURE: 97.8 F | DIASTOLIC BLOOD PRESSURE: 65 MMHG | SYSTOLIC BLOOD PRESSURE: 100 MMHG

## 2024-01-08 DIAGNOSIS — N39.45 CONTINUOUS LEAKAGE OF URINE: ICD-10-CM

## 2024-01-08 DIAGNOSIS — L89.313 PRESSURE INJURY OF RIGHT ISCHIUM, STAGE 3 (HCC): ICD-10-CM

## 2024-01-08 DIAGNOSIS — L89.312 DECUBITUS ULCER OF RIGHT BUTTOCK, STAGE 2 (HCC): Primary | ICD-10-CM

## 2024-01-08 PROCEDURE — 87075 CULTR BACTERIA EXCEPT BLOOD: CPT

## 2024-01-08 PROCEDURE — 11042 DBRDMT SUBQ TIS 1ST 20SQCM/<: CPT | Performed by: NURSE PRACTITIONER

## 2024-01-08 PROCEDURE — 87181 SC STD AGAR DILUTION PER AGT: CPT

## 2024-01-08 PROCEDURE — 11042 DBRDMT SUBQ TIS 1ST 20SQCM/<: CPT

## 2024-01-08 PROCEDURE — 87186 SC STD MICRODIL/AGAR DIL: CPT

## 2024-01-08 PROCEDURE — 87077 CULTURE AEROBIC IDENTIFY: CPT

## 2024-01-08 PROCEDURE — 87070 CULTURE OTHR SPECIMN AEROBIC: CPT

## 2024-01-08 NOTE — PATIENT INSTRUCTIONS
PHYSICIAN ORDERS AND DISCHARGE INSTRUCTIONS     Wound cleansing:               Do not scrub or use excessive force.              Wash hands with soap and water before and after dressing changes.              Prior to applying a clean dressing, cleanse wound with normal saline,               wound cleanser, or mild soap and water.               Ask the physician or nurse before getting the wound(s) wet in a shower     Daily Wound management:              Keep weight off wounds and reposition every 2 hours.              Avoid standing for long periods of time.              Apply wraps/stockings in AM and remove at bedtime.              If swelling is present, elevate legs to the level of the heart or above for              30 minutes 4-5 times a day and/or when sitting.                                  When taking antibiotics take entire prescription as ordered by              physician do not stop taking until medicine is all gone.                           Wound Care Notes:  Compression:   Offloading:   Imaging:   Cultures:             KEVIN:  Wound Care Supplies:   [facility]:   Rx:                                 Orders for this week:  2024                  Buttock wounds - Wash with soap and water, pat dry.   Apply desitin, stimulen powder, Antifungal powder, and clobetasol to excoriated areas.  Cover with (sacral) silicone border and tegaderm. Change every 2-3 days.     Dispense 30 day quantity when ordering supplies.        []   Nurse Visit:   Follow Up Instructions: At the Wound Care Center : 1 week   Primary Wound Care Provider: Nancy Delarosa CNP   Call  for any questions or concerns.  Central [phone] for imaging and lab work

## 2024-01-09 PROBLEM — N39.45 CONTINUOUS LEAKAGE OF URINE: Status: ACTIVE | Noted: 2024-01-09

## 2024-01-09 PROBLEM — L89.313 PRESSURE INJURY OF RIGHT ISCHIUM, STAGE 3 (HCC): Status: ACTIVE | Noted: 2024-01-09

## 2024-01-09 PROBLEM — L89.312 DECUBITUS ULCER OF RIGHT BUTTOCK, STAGE 2 (HCC): Status: RESOLVED | Noted: 2022-08-01 | Resolved: 2024-01-09

## 2024-01-09 RX ORDER — TRIAMCINOLONE ACETONIDE 1 MG/G
OINTMENT TOPICAL ONCE
OUTPATIENT
Start: 2024-01-09 | End: 2024-01-09

## 2024-01-09 RX ORDER — SODIUM CHLOR/HYPOCHLOROUS ACID 0.033 %
SOLUTION, IRRIGATION IRRIGATION ONCE
OUTPATIENT
Start: 2024-01-09 | End: 2024-01-09

## 2024-01-09 RX ORDER — BETAMETHASONE DIPROPIONATE 0.5 MG/G
CREAM TOPICAL ONCE
OUTPATIENT
Start: 2024-01-09 | End: 2024-01-09

## 2024-01-09 RX ORDER — GENTAMICIN SULFATE 1 MG/G
OINTMENT TOPICAL ONCE
OUTPATIENT
Start: 2024-01-09 | End: 2024-01-09

## 2024-01-09 RX ORDER — BACITRACIN ZINC AND POLYMYXIN B SULFATE 500; 1000 [USP'U]/G; [USP'U]/G
OINTMENT TOPICAL ONCE
OUTPATIENT
Start: 2024-01-09 | End: 2024-01-09

## 2024-01-09 RX ORDER — IBUPROFEN 200 MG
TABLET ORAL ONCE
OUTPATIENT
Start: 2024-01-09 | End: 2024-01-09

## 2024-01-09 RX ORDER — LIDOCAINE HYDROCHLORIDE 40 MG/ML
SOLUTION TOPICAL ONCE
OUTPATIENT
Start: 2024-01-09 | End: 2024-01-09

## 2024-01-09 RX ORDER — BACITRACIN ZINC 500 [USP'U]/G
OINTMENT TOPICAL ONCE
OUTPATIENT
Start: 2024-01-09 | End: 2024-01-09

## 2024-01-09 RX ORDER — LIDOCAINE 50 MG/G
OINTMENT TOPICAL ONCE
OUTPATIENT
Start: 2024-01-09 | End: 2024-01-09

## 2024-01-09 RX ORDER — LIDOCAINE 40 MG/G
CREAM TOPICAL ONCE
OUTPATIENT
Start: 2024-01-09 | End: 2024-01-09

## 2024-01-09 RX ORDER — CLOBETASOL PROPIONATE 0.5 MG/G
OINTMENT TOPICAL ONCE
OUTPATIENT
Start: 2024-01-09 | End: 2024-01-09

## 2024-01-09 NOTE — PROGRESS NOTES
Wound Care Center Progress Visit      Kalen Levy  AGE: 33 y.o.   GENDER: female  : 1990  EPISODE DATE:  2024   Referred by: No primary care provider on file.     Subjective:     CHIEF COMPLAINT  WOUND   Problem List Items Addressed This Visit          Other    RESOLVED: WD-Decubitus ulcer of right buttock, stage 2 (HCC) - Primary    Relevant Orders    Culture, Aerobic and Anaerobic    Pressure injury of right ischium, stage 3 (HCC)     Chief Complaint   Patient presents with    Wound Check        HISTORY of PRESENT ILLNESS      Kalen Levy is a 33 y.o. 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 The condition is of moderate severity. The area is recurrent in nature and they have working on it on and off for several months  The underlying cause is thought to be pressure and moisture. The patients care to date has included foam dressings. The patient has significant underlying medical conditions as below.     Wound Pain Timing/Severity: intermittent, mild  Quality of pain: tender  Severity of pain:  2 / 10   Modifying Factors: chronic pressure, decreased mobility, shear force, and obesity  Associated Signs/Symptoms: drainage and pain    KEVIN: as indicated base on wound location and assessment    Wound infection: wound culture will be obtained as needed for symptoms of infection     Culture   Date Value Ref Range Status   2024   Preliminary    Prelim Report Anaerobic culture further report to follow   2022 Final Report No anaerobes isolated  Final   2022 KLEBSIELLA PNEUMONIAE Heavy growth (A)  Final   2022 PSEUDOMONAS AERUGINOSA Heavy growth (A)  Final   2022 ENTEROCOCCUS FAECALIS Light growth (A)  Final      Arterial evaluation: if indicated based on wound, location, symptoms and healing    Venous Evaluation: if indicated based on wound, location, symptoms and

## 2024-01-13 LAB
CULTURE: ABNORMAL
Lab: ABNORMAL
SPECIMEN: ABNORMAL

## 2024-01-15 ENCOUNTER — HOSPITAL ENCOUNTER (OUTPATIENT)
Dept: WOUND CARE | Age: 34
Discharge: HOME | End: 2024-01-15
Payer: COMMERCIAL

## 2024-01-15 VITALS
DIASTOLIC BLOOD PRESSURE: 71 MMHG | RESPIRATION RATE: 16 BRPM | SYSTOLIC BLOOD PRESSURE: 131 MMHG | HEART RATE: 80 BPM | TEMPERATURE: 97 F

## 2024-01-15 DIAGNOSIS — N39.45 CONTINUOUS LEAKAGE OF URINE: ICD-10-CM

## 2024-01-15 DIAGNOSIS — G82.20 PARAPLEGIA (HCC): ICD-10-CM

## 2024-01-15 DIAGNOSIS — T14.8XXA EXCORIATION: ICD-10-CM

## 2024-01-15 DIAGNOSIS — L89.313 PRESSURE INJURY OF RIGHT ISCHIUM, STAGE 3 (HCC): Primary | ICD-10-CM

## 2024-01-15 PROCEDURE — 97597 DBRDMT OPN WND 1ST 20 CM/<: CPT | Performed by: NURSE PRACTITIONER

## 2024-01-15 PROCEDURE — 97597 DBRDMT OPN WND 1ST 20 CM/<: CPT

## 2024-01-15 RX ORDER — SODIUM CHLOR/HYPOCHLOROUS ACID 0.033 %
SOLUTION, IRRIGATION IRRIGATION ONCE
OUTPATIENT
Start: 2024-01-15 | End: 2024-01-15

## 2024-01-15 RX ORDER — LIDOCAINE 40 MG/G
CREAM TOPICAL ONCE
OUTPATIENT
Start: 2024-01-15 | End: 2024-01-15

## 2024-01-15 RX ORDER — LIDOCAINE HYDROCHLORIDE 40 MG/ML
SOLUTION TOPICAL ONCE
OUTPATIENT
Start: 2024-01-15 | End: 2024-01-15

## 2024-01-15 RX ORDER — BACITRACIN ZINC AND POLYMYXIN B SULFATE 500; 1000 [USP'U]/G; [USP'U]/G
OINTMENT TOPICAL ONCE
OUTPATIENT
Start: 2024-01-15 | End: 2024-01-15

## 2024-01-15 RX ORDER — CLOBETASOL PROPIONATE 0.5 MG/G
OINTMENT TOPICAL
Qty: 60 G | Refills: 1 | Status: SHIPPED | OUTPATIENT
Start: 2024-01-15

## 2024-01-15 RX ORDER — BETAMETHASONE DIPROPIONATE 0.5 MG/G
CREAM TOPICAL ONCE
OUTPATIENT
Start: 2024-01-15 | End: 2024-01-15

## 2024-01-15 RX ORDER — LIDOCAINE 50 MG/G
OINTMENT TOPICAL ONCE
OUTPATIENT
Start: 2024-01-15 | End: 2024-01-15

## 2024-01-15 RX ORDER — BACITRACIN ZINC 500 [USP'U]/G
OINTMENT TOPICAL ONCE
OUTPATIENT
Start: 2024-01-15 | End: 2024-01-15

## 2024-01-15 RX ORDER — IBUPROFEN 200 MG
TABLET ORAL ONCE
OUTPATIENT
Start: 2024-01-15 | End: 2024-01-15

## 2024-01-15 RX ORDER — CLOBETASOL PROPIONATE 0.5 MG/G
OINTMENT TOPICAL ONCE
OUTPATIENT
Start: 2024-01-15 | End: 2024-01-15

## 2024-01-15 RX ORDER — TRIAMCINOLONE ACETONIDE 1 MG/G
OINTMENT TOPICAL ONCE
OUTPATIENT
Start: 2024-01-15 | End: 2024-01-15

## 2024-01-15 RX ORDER — GENTAMICIN SULFATE 1 MG/G
OINTMENT TOPICAL ONCE
OUTPATIENT
Start: 2024-01-15 | End: 2024-01-15

## 2024-01-15 NOTE — PATIENT INSTRUCTIONS
PHYSICIAN ORDERS AND DISCHARGE INSTRUCTIONS     Wound cleansing:               Do not scrub or use excessive force.              Wash hands with soap and water before and after dressing changes.              Prior to applying a clean dressing, cleanse wound with normal saline,               wound cleanser, or mild soap and water.               Ask the physician or nurse before getting the wound(s) wet in a shower     Daily Wound management:              Keep weight off wounds and reposition every 2 hours.              Avoid standing for long periods of time.              Apply wraps/stockings in AM and remove at bedtime.              If swelling is present, elevate legs to the level of the heart or above for              30 minutes 4-5 times a day and/or when sitting.                                  When taking antibiotics take entire prescription as ordered by              physician do not stop taking until medicine is all gone.                           Wound Care Notes:  Compression:   Offloading:   Imaging:   Cultures:             KEVIN:  Wound Care Supplies:   [facility]:   Rx:                                 Orders for this week:  1/15/2024                  Right Buttock wound cultured 24    Right Buttock wound - Wash with soap and water, pat dry.   Apply desitin, stimulen powder, Antifungal powder, and clobetasol to excoriated areas.  Cover with (sacral) silicone border and tegaderm. Change every 2-3 days.     Dispense 30 day quantity when ordering supplies.        []   Nurse Visit:   Follow Up Instructions: At the Wound Care Center : 1 week   Primary Wound Care Provider: Nancy Delarosa CNP   Call  for any questions or concerns.  Central [phone] for imaging and lab work

## 2024-01-15 NOTE — PROGRESS NOTES
Wound Care Center Progress Visit      Kalen Levy  AGE: 33 y.o.   GENDER: female  : 1990  EPISODE DATE:  1/15/2024   Referred by: No primary care provider on file.     Subjective:     CHIEF COMPLAINT  WOUND   Problem List Items Addressed This Visit          Other    WD-Paraplegia (HCC)    Relevant Orders    Initiate Outpatient Wound Care Protocol    WD-Excoriation    Relevant Orders    Initiate Outpatient Wound Care Protocol    Pressure injury of right ischium, stage 3 (HCC) - Primary    Relevant Orders    Initiate Outpatient Wound Care Protocol    Continuous leakage of urine    Relevant Orders    Initiate Outpatient Wound Care Protocol     Chief Complaint   Patient presents with    Wound Check        HISTORY of PRESENT ILLNESS      Kalen Levy is a 33 y.o. 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 The condition is of moderate severity. The area is recurrent in nature and they have working on it on and off for several months  The underlying cause is thought to be pressure and moisture. The patients care to date has included foam dressings. The patient has significant underlying medical conditions as below.     Wound Pain Timing/Severity: intermittent, mild  Quality of pain: tender  Severity of pain:  2 / 10   Modifying Factors: chronic pressure, decreased mobility, shear force, and obesity  Associated Signs/Symptoms: drainage and pain    KEVIN: as indicated base on wound location and assessment    Wound infection: wound culture will be obtained as needed for symptoms of infection     Culture   Date Value Ref Range Status   2024 Final Report No anaerobes isolated  Final   2024 PSEUDOMONAS AERUGINOSA Light growth (A)  Final   2024 ENTEROCOCCUS FAECALIS Light growth (A)  Final      Arterial evaluation: if indicated based on wound, location, symptoms and healing    Venous Evaluation:

## 2024-01-22 ENCOUNTER — HOSPITAL ENCOUNTER (OUTPATIENT)
Dept: WOUND CARE | Age: 34
Discharge: HOME | End: 2024-01-22
Payer: COMMERCIAL

## 2024-01-22 VITALS
HEART RATE: 83 BPM | DIASTOLIC BLOOD PRESSURE: 87 MMHG | TEMPERATURE: 97.1 F | RESPIRATION RATE: 16 BRPM | SYSTOLIC BLOOD PRESSURE: 124 MMHG

## 2024-01-22 DIAGNOSIS — L89.313 PRESSURE INJURY OF RIGHT ISCHIUM, STAGE 3 (HCC): Primary | ICD-10-CM

## 2024-01-22 DIAGNOSIS — T14.8XXA EXCORIATION: ICD-10-CM

## 2024-01-22 DIAGNOSIS — N39.45 CONTINUOUS LEAKAGE OF URINE: ICD-10-CM

## 2024-01-22 DIAGNOSIS — G82.20 PARAPLEGIA (HCC): ICD-10-CM

## 2024-01-22 PROCEDURE — 11042 DBRDMT SUBQ TIS 1ST 20SQCM/<: CPT

## 2024-01-22 PROCEDURE — 11042 DBRDMT SUBQ TIS 1ST 20SQCM/<: CPT | Performed by: NURSE PRACTITIONER

## 2024-01-22 PROCEDURE — 6370000000 HC RX 637 (ALT 250 FOR IP): Performed by: NURSE PRACTITIONER

## 2024-01-22 RX ORDER — BETAMETHASONE DIPROPIONATE 0.5 MG/G
CREAM TOPICAL ONCE
OUTPATIENT
Start: 2024-01-22 | End: 2024-01-22

## 2024-01-22 RX ORDER — SODIUM CHLOR/HYPOCHLOROUS ACID 0.033 %
SOLUTION, IRRIGATION IRRIGATION ONCE
OUTPATIENT
Start: 2024-01-22 | End: 2024-01-22

## 2024-01-22 RX ORDER — LIDOCAINE HYDROCHLORIDE 40 MG/ML
SOLUTION TOPICAL ONCE
OUTPATIENT
Start: 2024-01-22 | End: 2024-01-22

## 2024-01-22 RX ORDER — IBUPROFEN 200 MG
TABLET ORAL ONCE
OUTPATIENT
Start: 2024-01-22 | End: 2024-01-22

## 2024-01-22 RX ORDER — BACITRACIN ZINC AND POLYMYXIN B SULFATE 500; 1000 [USP'U]/G; [USP'U]/G
OINTMENT TOPICAL ONCE
OUTPATIENT
Start: 2024-01-22 | End: 2024-01-22

## 2024-01-22 RX ORDER — CLOBETASOL PROPIONATE 0.5 MG/G
OINTMENT TOPICAL ONCE
Status: COMPLETED | OUTPATIENT
Start: 2024-01-22 | End: 2024-01-22

## 2024-01-22 RX ORDER — LIDOCAINE 40 MG/G
CREAM TOPICAL ONCE
OUTPATIENT
Start: 2024-01-22 | End: 2024-01-22

## 2024-01-22 RX ORDER — BACITRACIN ZINC 500 [USP'U]/G
OINTMENT TOPICAL ONCE
OUTPATIENT
Start: 2024-01-22 | End: 2024-01-22

## 2024-01-22 RX ORDER — CLOBETASOL PROPIONATE 0.5 MG/G
OINTMENT TOPICAL ONCE
OUTPATIENT
Start: 2024-01-22 | End: 2024-01-22

## 2024-01-22 RX ORDER — GENTAMICIN SULFATE 1 MG/G
OINTMENT TOPICAL ONCE
OUTPATIENT
Start: 2024-01-22 | End: 2024-01-22

## 2024-01-22 RX ORDER — LIDOCAINE 50 MG/G
OINTMENT TOPICAL ONCE
OUTPATIENT
Start: 2024-01-22 | End: 2024-01-22

## 2024-01-22 RX ORDER — TRIAMCINOLONE ACETONIDE 1 MG/G
OINTMENT TOPICAL ONCE
OUTPATIENT
Start: 2024-01-22 | End: 2024-01-22

## 2024-01-22 RX ADMIN — CLOBETASOL PROPIONATE: 0.5 OINTMENT TOPICAL at 10:28

## 2024-01-22 NOTE — PATIENT INSTRUCTIONS
PHYSICIAN ORDERS AND DISCHARGE INSTRUCTIONS     Wound cleansing:               Do not scrub or use excessive force.              Wash hands with soap and water before and after dressing changes.              Prior to applying a clean dressing, cleanse wound with normal saline,               wound cleanser, or mild soap and water.               Ask the physician or nurse before getting the wound(s) wet in a shower     Daily Wound management:              Keep weight off wounds and reposition every 2 hours.              Avoid standing for long periods of time.              Apply wraps/stockings in AM and remove at bedtime.              If swelling is present, elevate legs to the level of the heart or above for              30 minutes 4-5 times a day and/or when sitting.                                  When taking antibiotics take entire prescription as ordered by              physician do not stop taking until medicine is all gone.                           Wound Care Notes:  Compression:   Offloading:   Imaging:   Cultures:             KEVIN:  Wound Care Supplies:   [facility]:   Rx:                                 Orders for this week:  2024                  Right Buttock wound cultured 24    Right Buttock wound - Wash with soap and water, pat dry.   Apply desitin, stimulen powder, Antifungal powder, and clobetasol to excoriated areas.  Cover with (sacral) silicone border and tegaderm. Change every 2-3 days.     Dispense 30 day quantity when ordering supplies.        []   Nurse Visit:   Follow Up Instructions: At the Wound Care Center : 2 weeks  Primary Wound Care Provider: Nancy Delarosa CNP   Call  for any questions or concerns.  Central [phone] for imaging and lab work

## 2024-01-22 NOTE — PROGRESS NOTES
Wound Care Center Progress Visit      Kalen Levy  AGE: 33 y.o.   GENDER: female  : 1990  EPISODE DATE:  2024   Referred by: No primary care provider on file.     Subjective:     CHIEF COMPLAINT  WOUND   Problem List Items Addressed This Visit          Other    WD-Paraplegia (HCC)    Relevant Orders    Initiate Outpatient Wound Care Protocol    WD-Excoriation    Relevant Orders    Initiate Outpatient Wound Care Protocol    Pressure injury of right ischium, stage 3 (HCC) - Primary    Relevant Orders    Initiate Outpatient Wound Care Protocol    Continuous leakage of urine    Relevant Orders    Initiate Outpatient Wound Care Protocol     Chief Complaint   Patient presents with    Wound Check        HISTORY of PRESENT ILLNESS      Kalen Levy is a 33 y.o. 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 The condition is of moderate severity. The area is recurrent in nature and they have working on it on and off for several months  The underlying cause is thought to be pressure and moisture. The patients care to date has included foam dressings. The patient has significant underlying medical conditions as below.     Wound Pain Timing/Severity: intermittent, mild  Quality of pain: tender  Severity of pain:  1 / 10   Modifying Factors: chronic pressure, decreased mobility, shear force, and obesity  Associated Signs/Symptoms: drainage and pain    KEVIN: as indicated base on wound location and assessment    Wound infection: wound culture will be obtained as needed for symptoms of infection     Culture   Date Value Ref Range Status   2024 Final Report No anaerobes isolated  Final   2024 PSEUDOMONAS AERUGINOSA Light growth (A)  Final   2024 ENTEROCOCCUS FAECALIS Light growth (A)  Final      Arterial evaluation: if indicated based on wound, location, symptoms and healing    Venous Evaluation:

## 2024-02-05 ENCOUNTER — HOSPITAL ENCOUNTER (OUTPATIENT)
Dept: WOUND CARE | Age: 34
Discharge: HOME | End: 2024-02-05
Payer: COMMERCIAL

## 2024-02-05 VITALS
RESPIRATION RATE: 16 BRPM | TEMPERATURE: 96.8 F | SYSTOLIC BLOOD PRESSURE: 110 MMHG | HEART RATE: 86 BPM | DIASTOLIC BLOOD PRESSURE: 63 MMHG

## 2024-02-05 DIAGNOSIS — T14.8XXA EXCORIATION: ICD-10-CM

## 2024-02-05 DIAGNOSIS — N39.45 CONTINUOUS LEAKAGE OF URINE: ICD-10-CM

## 2024-02-05 DIAGNOSIS — L89.313 PRESSURE INJURY OF RIGHT ISCHIUM, STAGE 3 (HCC): ICD-10-CM

## 2024-02-05 DIAGNOSIS — G82.20 PARAPLEGIA (HCC): Primary | ICD-10-CM

## 2024-02-05 PROCEDURE — 11042 DBRDMT SUBQ TIS 1ST 20SQCM/<: CPT | Performed by: NURSE PRACTITIONER

## 2024-02-05 PROCEDURE — 11042 DBRDMT SUBQ TIS 1ST 20SQCM/<: CPT

## 2024-02-05 RX ORDER — BACITRACIN ZINC AND POLYMYXIN B SULFATE 500; 1000 [USP'U]/G; [USP'U]/G
OINTMENT TOPICAL ONCE
OUTPATIENT
Start: 2024-02-05 | End: 2024-02-05

## 2024-02-05 RX ORDER — BACITRACIN ZINC 500 [USP'U]/G
OINTMENT TOPICAL ONCE
OUTPATIENT
Start: 2024-02-05 | End: 2024-02-05

## 2024-02-05 RX ORDER — SODIUM CHLOR/HYPOCHLOROUS ACID 0.033 %
SOLUTION, IRRIGATION IRRIGATION ONCE
OUTPATIENT
Start: 2024-02-05 | End: 2024-02-05

## 2024-02-05 RX ORDER — TRIAMCINOLONE ACETONIDE 1 MG/G
OINTMENT TOPICAL ONCE
OUTPATIENT
Start: 2024-02-05 | End: 2024-02-05

## 2024-02-05 RX ORDER — CLOBETASOL PROPIONATE 0.5 MG/G
OINTMENT TOPICAL ONCE
Status: COMPLETED | OUTPATIENT
Start: 2024-02-05 | End: 2024-02-05

## 2024-02-05 RX ORDER — GENTAMICIN SULFATE 1 MG/G
OINTMENT TOPICAL ONCE
OUTPATIENT
Start: 2024-02-05 | End: 2024-02-05

## 2024-02-05 RX ORDER — IBUPROFEN 200 MG
TABLET ORAL ONCE
OUTPATIENT
Start: 2024-02-05 | End: 2024-02-05

## 2024-02-05 RX ORDER — CLOBETASOL PROPIONATE 0.5 MG/G
OINTMENT TOPICAL ONCE
OUTPATIENT
Start: 2024-02-05 | End: 2024-02-05

## 2024-02-05 RX ORDER — LIDOCAINE 50 MG/G
OINTMENT TOPICAL ONCE
OUTPATIENT
Start: 2024-02-05 | End: 2024-02-05

## 2024-02-05 RX ORDER — LIDOCAINE 40 MG/G
CREAM TOPICAL ONCE
OUTPATIENT
Start: 2024-02-05 | End: 2024-02-05

## 2024-02-05 RX ORDER — LIDOCAINE HYDROCHLORIDE 40 MG/ML
SOLUTION TOPICAL ONCE
OUTPATIENT
Start: 2024-02-05 | End: 2024-02-05

## 2024-02-05 RX ORDER — BETAMETHASONE DIPROPIONATE 0.5 MG/G
CREAM TOPICAL ONCE
OUTPATIENT
Start: 2024-02-05 | End: 2024-02-05

## 2024-02-05 RX ADMIN — CLOBETASOL PROPIONATE: 0.5 OINTMENT TOPICAL at 10:53

## 2024-02-05 NOTE — PROGRESS NOTES
weeks  Primary Wound Care Provider: Nancy Delarosa CNP   Call  for any questions or concerns.  Central [phone] for imaging and lab work      Treatment Note      Written Patient Dismissal Instructions Given            Electronically signed by PRECIOUS Echols CNP on 2024 at 10:45 AM

## 2024-02-05 NOTE — PATIENT INSTRUCTIONS
PHYSICIAN ORDERS AND DISCHARGE INSTRUCTIONS     Wound cleansing:               Do not scrub or use excessive force.              Wash hands with soap and water before and after dressing changes.              Prior to applying a clean dressing, cleanse wound with normal saline,               wound cleanser, or mild soap and water.               Ask the physician or nurse before getting the wound(s) wet in a shower                          Wound Care Notes:                                  Orders for this week:  2024                  Right Buttock wound cultured 24    Right Buttock wound - Wash with soap and water, pat dry.   Apply desitin, stimulen powder, Antifungal powder, and clobetasol to excoriated areas.  Cover with (sacral) silicone border and mastisol and tegaderm. Change every 2-3 days.     Dispense 30 day quantity when ordering supplies.        []   Nurse Visit:   Follow Up Instructions: At the Wound Care Center : 2 weeks  Primary Wound Care Provider: Nancy Delarosa CNP   Call  for any questions or concerns.  Central [phone] for imaging and lab work

## 2024-02-19 ENCOUNTER — HOSPITAL ENCOUNTER (OUTPATIENT)
Dept: WOUND CARE | Age: 34
Discharge: HOME | End: 2024-02-19
Payer: COMMERCIAL

## 2024-02-19 VITALS
RESPIRATION RATE: 16 BRPM | TEMPERATURE: 97.6 F | DIASTOLIC BLOOD PRESSURE: 69 MMHG | HEART RATE: 94 BPM | SYSTOLIC BLOOD PRESSURE: 112 MMHG

## 2024-02-19 DIAGNOSIS — L89.313 PRESSURE INJURY OF RIGHT ISCHIUM, STAGE 3 (HCC): ICD-10-CM

## 2024-02-19 DIAGNOSIS — N39.45 CONTINUOUS LEAKAGE OF URINE: ICD-10-CM

## 2024-02-19 DIAGNOSIS — T14.8XXA EXCORIATION: ICD-10-CM

## 2024-02-19 DIAGNOSIS — G82.20 PARAPLEGIA (HCC): Primary | ICD-10-CM

## 2024-02-19 PROCEDURE — 97597 DBRDMT OPN WND 1ST 20 CM/<: CPT | Performed by: NURSE PRACTITIONER

## 2024-02-19 PROCEDURE — 97597 DBRDMT OPN WND 1ST 20 CM/<: CPT

## 2024-02-19 RX ORDER — SODIUM CHLOR/HYPOCHLOROUS ACID 0.033 %
SOLUTION, IRRIGATION IRRIGATION ONCE
Status: CANCELLED | OUTPATIENT
Start: 2024-02-19 | End: 2024-02-19

## 2024-02-19 RX ORDER — LIDOCAINE HYDROCHLORIDE 40 MG/ML
SOLUTION TOPICAL ONCE
Status: CANCELLED | OUTPATIENT
Start: 2024-02-19 | End: 2024-02-19

## 2024-02-19 RX ORDER — LIDOCAINE 50 MG/G
OINTMENT TOPICAL ONCE
Status: CANCELLED | OUTPATIENT
Start: 2024-02-19 | End: 2024-02-19

## 2024-02-19 RX ORDER — BACITRACIN ZINC 500 [USP'U]/G
OINTMENT TOPICAL ONCE
Status: CANCELLED | OUTPATIENT
Start: 2024-02-19 | End: 2024-02-19

## 2024-02-19 RX ORDER — CLOBETASOL PROPIONATE 0.5 MG/G
OINTMENT TOPICAL ONCE
Status: CANCELLED | OUTPATIENT
Start: 2024-02-19 | End: 2024-02-19

## 2024-02-19 RX ORDER — LIDOCAINE 40 MG/G
CREAM TOPICAL ONCE
Status: CANCELLED | OUTPATIENT
Start: 2024-02-19 | End: 2024-02-19

## 2024-02-19 RX ORDER — IBUPROFEN 200 MG
TABLET ORAL ONCE
Status: CANCELLED | OUTPATIENT
Start: 2024-02-19 | End: 2024-02-19

## 2024-02-19 RX ORDER — BACITRACIN ZINC AND POLYMYXIN B SULFATE 500; 1000 [USP'U]/G; [USP'U]/G
OINTMENT TOPICAL ONCE
Status: CANCELLED | OUTPATIENT
Start: 2024-02-19 | End: 2024-02-19

## 2024-02-19 RX ORDER — TRIAMCINOLONE ACETONIDE 1 MG/G
OINTMENT TOPICAL ONCE
Status: CANCELLED | OUTPATIENT
Start: 2024-02-19 | End: 2024-02-19

## 2024-02-19 RX ORDER — GENTAMICIN SULFATE 1 MG/G
OINTMENT TOPICAL ONCE
Status: CANCELLED | OUTPATIENT
Start: 2024-02-19 | End: 2024-02-19

## 2024-02-19 RX ORDER — BETAMETHASONE DIPROPIONATE 0.5 MG/G
CREAM TOPICAL ONCE
Status: CANCELLED | OUTPATIENT
Start: 2024-02-19 | End: 2024-02-19

## 2024-02-19 NOTE — PLAN OF CARE
Problem: Wound:  Goal: Will show signs of wound healing; wound closure and no evidence of infection  Description: Will show signs of wound healing; wound closure and no evidence of infection  2/19/2024 1059 by Marlin Carroll LPN  Outcome: Completed  2/19/2024 1058 by Marlin Carroll LPN  Outcome: Progressing

## 2024-02-19 NOTE — PROGRESS NOTES
previous healing.    Performed by: PRECIOUS Echols CNP    Consent obtained: Yes    Time out taken:  Yes    Pain Control: Anesthetic  Anesthetic: 2% Lidocaine Topical Spray       Debridement:Excisional Debridement    Using curette the wound(s) was/were sharply debrided down through and including the removal of devitalized tissue    Devitalized Tissue Debrided:  fibrin, biofilm, slough, and exudate    Pre Debridement Measurements:  Are located in the Wound Documentation Flow Sheet    All active wounds listed below with today's date are evaluated  Wound(s)    debrided this date include # : 1     Post  Debridement Measurements:  Wound 01/08/24 Right #1 right buttocks (Active)   Wound Image   02/05/24 1026   Wound Etiology Pressure Stage 3 02/19/24 1015   Dressing Status New dressing applied;Clean;Dry;Intact 02/19/24 1058   Wound Cleansed Wound cleanser;Soap and water 02/19/24 1015   Offloading for Diabetic Foot Ulcers Other (comment) 02/19/24 1015   Wound Length (cm) 0.9 cm 02/19/24 1015   Wound Width (cm) 0.8 cm 02/19/24 1015   Wound Depth (cm) 0.1 cm 02/19/24 1015   Wound Surface Area (cm^2) 0.72 cm^2 02/19/24 1015   Change in Wound Size % (l*w) 28 02/19/24 1015   Wound Volume (cm^3) 0.072 cm^3 02/19/24 1015   Wound Healing % 28 02/19/24 1015   Post-Procedure Length (cm) 0.9 cm 02/19/24 1021   Post-Procedure Width (cm) 0.8 cm 02/19/24 1021   Post-Procedure Depth (cm) 0.1 cm 02/19/24 1021   Post-Procedure Surface Area (cm^2) 0.72 cm^2 02/19/24 1021   Post-Procedure Volume (cm^3) 0.072 cm^3 02/19/24 1021   Distance Tunneling (cm) 0 cm 02/19/24 1015   Tunneling Position ___ O'Clock 0 02/19/24 1015   Undermining Starts ___ O'Clock 0 02/19/24 1015   Undermining Ends___ O'Clock 0 02/19/24 1015   Undermining Maxium Distance (cm) 0 02/19/24 1015   Wound Assessment Pink/red 02/19/24 1015   Drainage Amount Scant (moist but unmeasurable) 02/19/24 1015   Drainage Description Serosanguinous 02/19/24 1015   Odor None

## 2024-02-19 NOTE — PATIENT INSTRUCTIONS
"  Physical Therapy Daily Treatment Note     Name: Lissy Palencia  Clinic Number: [id number]    Therapy Diagnosis:   Encounter Diagnoses   Name Primary?    Decreased strength     Poor posture      Physician: Danielle Hinojosa, *    Visit Date: 9/5/2019    Physician Orders: PT Eval and Treat: 2-3 times a week/ 6-8 weeks  Medical Diagnosis:   M54.2 (ICD-10-CM) - Neck pain   M47.812 (ICD-10-CM) - Cervical spondylosis without myelopathy   M50.30 (ICD-10-CM) - DDD (degenerative disc disease), cervical   M54.6,G89.29 (ICD-10-CM) - Chronic midline thoracic back pain   M54.41,G89.29 (ICD-10-CM) - Chronic bilateral low back pain with right-sided sciatica   M51.36 (ICD-10-CM) - DDD (degenerative disc disease), lumbar   M47.26 (ICD-10-CM) - Other spondylosis with radiculopathy, lumbar region   M54.16 (ICD-10-CM) - Lumbar radiculopathy        Evaluation Date: 8/29/2019  Authorization Period Expiration: 12/31/2019  Plan of Care Certification Period: 10/11/2019  Visit #/Visits authorized: 1/ 20   FOTO: 2/5    Gcode: 2/10  Visit:  118.42  Total: 201.30    Time In: 0855 am   Time Out: 0955 am   Total Billable Time: 60 minutes    Precautions: Standard    Subjective     Pt reports: "When I woke up this morning I was feeling a cramping sensation in my left buttock right here with patient pointing at left piriformis region.".  She was compliant with home exercise program.  Response to previous treatment: 1st treatment session  Functional change: none    Pain: 9/10  Location: left>right buttock/low back region      Objective     Lissy received the following manual therapy techniques: Myofacial release and Soft tissue Mobilization were applied to the: bilateral piriformis and posterior/latearl hip region for 15 minutes, including:  Patient in alternating sidelying position for:  STM/MFR to bilateral piriformis region including STM to posterior/lateral hip region  Manual stretching to bilateral piriformis and hamstring musculature for 30 " PHYSICIAN ORDERS AND DISCHARGE INSTRUCTIONS     Wound cleansing:               Do not scrub or use excessive force.              Wash hands with soap and water before and after dressing changes.              Prior to applying a clean dressing, cleanse wound with normal saline,               wound cleanser, or mild soap and water.               Ask the physician or nurse before getting the wound(s) wet in a shower                          Wound Care Notes:                                  Orders for this week:  2024                  Right Buttock wound - Wash with soap and water, pat dry.   Apply desitin, stimulen powder, Antifungal powder, and clobetasol to excoriated areas.   Cover with (sacral) silicone border and mastisol and tegaderm ( stretch crease of buttock to apply) . Change every 2-3 days.     Dispense 30 day quantity when ordering supplies.        []   Nurse Visit:   Follow Up Instructions: At the Wound Care Center : As needed   Primary Wound Care Provider: Nancy Delarosa CNP   Call  for any questions or concerns.  Central [phone] for imaging and lab work   "seconds x 2 repetitions     Lissy received therapeutic exercises to develop strength, endurance, ROM, flexibility, posture and core stabilization for 45 minutes including:    Gastroc wedge stretch 30"x2  HS stretch with strap 30"x2  Modified piriformis stretch 30"x2  TA activation 5"x10  TA with LTR 5"x10  TA with marching 2x10   Chin tucks 5"x10  TA with SLR 2x10 B  UT stretch 30"x2 B  Scapular retraction 5"x10   Rows with PTB 2x10   Shoulder extension PTB 2x10    Neuro re-ed and endurance training with 4 extremities for reciprocal motion of 4 limbs on sci-fit x 5 min at level 1 at > or equal to 50 spm w/o rest.     Home Exercises Provided and Patient Education Provided     Education provided:   - HS stretch with strap and modified piriformis stretch to be completed daily holding for 30 seconds x 2 repetitions  - TA activation to be completed intermittently throughout the day while maintaining normal respiration with patient demonstrating understanding  - PT educated patient on promoting log rolling technique to decrease stress to low back region with patient able to return proper demonstration  - PT educated proper postural awareness to ensure upright trunk and scapular retraction to improve spine positioning and alignment     Written Home Exercises Provided: yes.  Exercises were reviewed and Lissy was able to demonstrate them prior to the end of the session.  Lissy demonstrated good  understanding of the education provided.     See EMR under Patient Instructions for exercises provided 9/5/2019.    Assessment     Patient with increase in tissue tension and tenderness to palpation at left>right piriformis musculature which improved with manual therapy however still limited. Patient requiring verbal instruction to ensure normal respiration throughout all exercises especially when completing TA activation.     Lissy is progressing well towards her goals.   Pt prognosis is Good.     Pt will continue to benefit from " skilled outpatient physical therapy to address the deficits listed in the problem list box on initial evaluation, provide pt/family education and to maximize pt's level of independence in the home and community environment.     Pt's spiritual, cultural and educational needs considered and pt agreeable to plan of care and goals.    Anticipated barriers to physical therapy:  chronicity of pain, obesity    Goals:   Short Term Goals (3 Weeks):   1. Pt will be independent with HEP to supplement PT in improving pain free mobility  2. Pt will improve (B) cervical rotation AROM 10 deg to improve cervical mobility for driving  3. Pt will improve impaired UE MMTs by 1/2 grade in all planes to improve strength for lifting and carrying tasks.  4. Pt will demonstrate improved sitting posture to decrease pain experienced in neck and back.  5. Pt will perform pallof press with good control to demonstrate improved core strength.  6. Pt will improve impaired LE MMTs by 1/2 grade to improve strength for functional tasks.     Long Term Goals (6 Weeks):   1. Pt will improve FOTO to </=35% limitation to improve perceived limitation with changing and maintaining mobility.  2. Pt will improve impaired LE MMTs 1 grade in all planes to improve strength for functional tasks  3. Pt will report neck pain </= 3/10 at worst in order to improve functional mobility  4. Pt will report lumbar pain and pain radiating into (R) LE </= 4/10 at worst in order to be able to perform ADLs with less difficulty    Plan     Continue as peggy, progress with soft tissue massage and manual/self stretching to bilateral LEs as able. Progress with TA activation and core stabilization.     Madison Gramajo, PT

## 2024-02-20 RX ORDER — CLOBETASOL PROPIONATE 0.5 MG/G
OINTMENT TOPICAL
Qty: 60 G | Refills: 1 | OUTPATIENT
Start: 2024-02-20

## 2024-03-05 NOTE — PLAN OF CARE
Problem: Pain:  Goal: Pain level will decrease  Description: Pain level will decrease  Outcome: Ongoing  Goal: Control of acute pain  Description: Control of acute pain  Outcome: Ongoing  Goal: Control of chronic pain  Description: Control of chronic pain  Outcome: Ongoing beer/wine/liquor

## 2024-04-04 ENCOUNTER — TELEPHONE (OUTPATIENT)
Dept: WOUND CARE | Age: 34
End: 2024-04-04

## 2024-04-15 ENCOUNTER — HOSPITAL ENCOUNTER (OUTPATIENT)
Dept: WOUND CARE | Age: 34
Discharge: HOME | End: 2024-04-15
Payer: COMMERCIAL

## 2024-04-15 VITALS — RESPIRATION RATE: 16 BRPM | DIASTOLIC BLOOD PRESSURE: 63 MMHG | SYSTOLIC BLOOD PRESSURE: 104 MMHG | HEART RATE: 93 BPM

## 2024-04-15 DIAGNOSIS — L89.313 PRESSURE INJURY OF RIGHT ISCHIUM, STAGE 3 (HCC): ICD-10-CM

## 2024-04-15 DIAGNOSIS — G82.20 PARAPLEGIA (HCC): ICD-10-CM

## 2024-04-15 DIAGNOSIS — N39.45 CONTINUOUS LEAKAGE OF URINE: Primary | ICD-10-CM

## 2024-04-15 DIAGNOSIS — T14.8XXA EXCORIATION: ICD-10-CM

## 2024-04-15 PROCEDURE — 11042 DBRDMT SUBQ TIS 1ST 20SQCM/<: CPT | Performed by: NURSE PRACTITIONER

## 2024-04-15 PROCEDURE — 11042 DBRDMT SUBQ TIS 1ST 20SQCM/<: CPT

## 2024-04-15 RX ORDER — CLOBETASOL PROPIONATE 0.5 MG/G
OINTMENT TOPICAL
Qty: 60 G | Refills: 0 | Status: SHIPPED | OUTPATIENT
Start: 2024-04-15

## 2024-04-15 NOTE — PATIENT INSTRUCTIONS
PHYSICIAN ORDERS AND DISCHARGE INSTRUCTIONS     Wound cleansing:               Do not scrub or use excessive force.              Wash hands with soap and water before and after dressing changes.              Prior to applying a clean dressing, cleanse wound with normal saline,               wound cleanser, or mild soap and water.               Ask the physician or nurse before getting the wound(s) wet in a shower                          Wound Care Notes:                               Orders for this week:  4/15/2024                  Right Buttock wound - Wash with soap and water, pat dry.   Apply desitin, Antifungal powder, and clobetasol to excoriated areas.   Qwick (Drawtex) to wound bed  Cover with silicone border gauze   Change daily     Dispense 30 day quantity when ordering supplies.        Nurse Visit:   Follow Up Instructions: At the Wound Care Center : 1 week  Primary Wound Care Provider: Nancy Delarosa CNP   Call  for any questions or concerns.  Central [phone] for imaging and lab work

## 2024-04-19 NOTE — PROGRESS NOTES
Change in Wound Size % (l*w) 85 04/15/24 1014   Wound Volume (cm^3) 0.015 cm^3 04/15/24 1014   Wound Healing % 85 04/15/24 1014   Post-Procedure Length (cm) 0.5 cm 04/15/24 1031   Post-Procedure Width (cm) 0.3 cm 04/15/24 1031   Post-Procedure Depth (cm) 0.1 cm 04/15/24 1031   Post-Procedure Surface Area (cm^2) 0.15 cm^2 04/15/24 1031   Post-Procedure Volume (cm^3) 0.015 cm^3 04/15/24 1031   Distance Tunneling (cm) 0 cm 04/15/24 1014   Tunneling Position ___ O'Clock 0 04/15/24 1014   Undermining Starts ___ O'Clock 0 04/15/24 1014   Undermining Ends___ O'Clock 0 04/15/24 1014   Undermining Maxium Distance (cm) 0 04/15/24 1014   Wound Assessment Pink/red 04/15/24 1014   Drainage Amount Moderate (25-50%) 04/15/24 1014   Drainage Description Yellow 04/15/24 1014   Odor None 04/15/24 1014   Dyan-wound Assessment Excoriated;Fragile 04/15/24 1014   Margins Defined edges 04/15/24 1014   Wound Thickness Description not for Pressure Injury Partial thickness 02/19/24 1015   Number of days: 101     Total  Area  Debrided:  0.15 sq cm     Bleeding:  Minimal    Hemostasis Achieved:  by pressure    Procedural Pain:  0  / 10     Post Procedural Pain:  0 / 10     Response to treatment:  Well tolerated by patient.      Status of wound progress and description from last visit: The wound was debrided today, no S&S infection.  Regimen discussed and established with patient as below, Home Health in place. Discussed pressure and moisture management. The patients records were reviewed and discussed. Time was given for questions. All questions were answered to the patients satisfaction. A total time of 20 minutes was spent with at least 50% related to face to face counseling and coordination of care.    Plan:     Discharge instructions:    Patient Instructions   PHYSICIAN ORDERS AND DISCHARGE INSTRUCTIONS     Wound cleansing:               Do not scrub or use excessive force.              Wash hands with soap and water before and after

## 2024-04-22 ENCOUNTER — HOSPITAL ENCOUNTER (OUTPATIENT)
Dept: WOUND CARE | Age: 34
Discharge: HOME | End: 2024-04-22
Payer: COMMERCIAL

## 2024-04-22 VITALS
RESPIRATION RATE: 16 BRPM | HEART RATE: 89 BPM | TEMPERATURE: 97 F | SYSTOLIC BLOOD PRESSURE: 118 MMHG | DIASTOLIC BLOOD PRESSURE: 70 MMHG

## 2024-04-22 DIAGNOSIS — L89.313 PRESSURE INJURY OF RIGHT ISCHIUM, STAGE 3 (HCC): ICD-10-CM

## 2024-04-22 DIAGNOSIS — T14.8XXA EXCORIATION: ICD-10-CM

## 2024-04-22 DIAGNOSIS — N39.45 CONTINUOUS LEAKAGE OF URINE: Primary | ICD-10-CM

## 2024-04-22 DIAGNOSIS — G82.20 PARAPLEGIA (HCC): ICD-10-CM

## 2024-04-22 PROCEDURE — 11042 DBRDMT SUBQ TIS 1ST 20SQCM/<: CPT

## 2024-04-22 PROCEDURE — 11042 DBRDMT SUBQ TIS 1ST 20SQCM/<: CPT | Performed by: NURSE PRACTITIONER

## 2024-04-22 NOTE — PATIENT INSTRUCTIONS
PHYSICIAN ORDERS AND DISCHARGE INSTRUCTIONS     Wound cleansing:               Do not scrub or use excessive force.              Wash hands with soap and water before and after dressing changes.              Prior to applying a clean dressing, cleanse wound with normal saline,               wound cleanser, or mild soap and water.               Ask the physician or nurse before getting the wound(s) wet in a shower                          Wound Care Notes:                               Orders for this week:  2024                  Right Buttock wound - Wash with soap and water, pat dry.   Apply desitin, Antifungal powder, 5% Lidocaine ontment , and clobetasol to excoriated areas.   Qwick (Drawtex) to wound bed  Cover with silicone border gauze   Change daily     Dispense 30 day quantity when ordering supplies.        Nurse Visit:   Follow Up Instructions: At the Wound Care Center : 4 week: Monday  Primary Wound Care Provider: Nancy Delarosa CNP   Call  for any questions or concerns.  Central [phone] for imaging and lab work

## 2024-04-22 NOTE — PROGRESS NOTES
Wound Care Center Progress Visit      Kalen Levy  AGE: 33 y.o.   GENDER: female  : 1990  EPISODE DATE:  2024   Referred by: Kenya Valera CNP    Subjective:     CHIEF COMPLAINT  WOUND   Problem List Items Addressed This Visit          Other    WD-Paraplegia (HCC)    WD-Excoriation    Pressure injury of right ischium, stage 3 (HCC)    Continuous leakage of urine - Primary     Chief Complaint   Patient presents with    Wound Check        HISTORY of PRESENT ILLNESS      Kalen Levy is a 33 y.o. 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 The condition is of moderate severity. The area is recurrent in nature and they have working on it on and off for several months, she was last at the wound clinic 24.  The underlying cause is thought to be pressure and moisture. The patients care to date has included foam dressings. The patient has significant underlying medical conditions as below.     Wound Pain Timing/Severity: intermittent, mild  Quality of pain: tender  Severity of pain:  1 / 10   Modifying Factors: chronic pressure, decreased mobility, shear force, and obesity  Associated Signs/Symptoms: drainage and pain    KEVIN: as indicated base on wound location and assessment    Wound infection: wound culture will be obtained as needed for symptoms of infection     Culture   Date Value Ref Range Status   2024 Final Report No anaerobes isolated  Final   2024 PSEUDOMONAS AERUGINOSA Light growth (A)  Final   2024 ENTEROCOCCUS FAECALIS Light growth (A)  Final      Arterial evaluation: if indicated based on wound, location, symptoms and healing    Venous Evaluation: if indicated based on wound, location, symptoms and healing    Radiology:    XR ABDOMEN (KUB) (SINGLE AP VIEW)  Narrative: EXAMINATION:  ONE SUPINE XRAY VIEW(S) OF THE ABDOMEN    2022 8:50

## 2024-05-20 ENCOUNTER — HOSPITAL ENCOUNTER (OUTPATIENT)
Dept: WOUND CARE | Age: 34
Discharge: HOME | End: 2024-05-20
Payer: COMMERCIAL

## 2024-05-20 VITALS
DIASTOLIC BLOOD PRESSURE: 77 MMHG | SYSTOLIC BLOOD PRESSURE: 104 MMHG | TEMPERATURE: 97.5 F | HEART RATE: 90 BPM | RESPIRATION RATE: 16 BRPM

## 2024-05-20 DIAGNOSIS — N39.45 CONTINUOUS LEAKAGE OF URINE: Primary | ICD-10-CM

## 2024-05-20 DIAGNOSIS — L89.313 PRESSURE INJURY OF RIGHT ISCHIUM, STAGE 3 (HCC): ICD-10-CM

## 2024-05-20 DIAGNOSIS — G82.20 PARAPLEGIA (HCC): ICD-10-CM

## 2024-05-20 DIAGNOSIS — T14.8XXA EXCORIATION: ICD-10-CM

## 2024-05-20 PROCEDURE — 11042 DBRDMT SUBQ TIS 1ST 20SQCM/<: CPT | Performed by: NURSE PRACTITIONER

## 2024-05-20 PROCEDURE — 11042 DBRDMT SUBQ TIS 1ST 20SQCM/<: CPT

## 2024-05-20 NOTE — PATIENT INSTRUCTIONS
PHYSICIAN ORDERS AND DISCHARGE INSTRUCTIONS     Wound cleansing:               Do not scrub or use excessive force.              Wash hands with soap and water before and after dressing changes.              Prior to applying a clean dressing, cleanse wound with normal saline,               wound cleanser, or mild soap and water.               Ask the physician or nurse before getting the wound(s) wet in a shower                          Wound Care Notes:                               Orders for this week:  2024                  Right Buttock wound - Wash with soap and water, pat dry.   In Clinic Apply Puracol Ag, Cover with Sacral Border    At Home--Apply desitin, Antifungal powder, 5% Lidocaine ontment , and clobetasol to excoriated areas.   Qwick (Drawtex) to wound bed  Cover with silicone border gauze   Change daily     Dispense 30 day quantity when ordering supplies.        Nurse Visit:   Follow Up Instructions: At the Wound Care Center : 4 week: Monday  Primary Wound Care Provider: Nancy Delarosa CNP   Call  for any questions or concerns.  Central [phone] for imaging and lab work

## 2024-05-21 NOTE — PROGRESS NOTES
04/19/2022    CO2 26 04/19/2022    BUN 9 04/19/2022    CREATININE 0.4 (L) 04/19/2022    GLUCOSE 97 04/19/2022    CALCIUM 8.9 04/19/2022    PROT 7.5 10/27/2012    BILITOT 0.1 04/19/2022    ALKPHOS 93 04/19/2022    AST 17 04/19/2022    ALT 14 04/19/2022    LABGLOM >60 04/19/2022    GFRAA >60 04/19/2022    AGRATIO 1.4 08/29/2019    GLOB 3.1 08/29/2019        Off Loading  Offloading or minimizing or removing weight placed on an area with poor circulation such as diabetic wounds or pressure. This can be achieved with crutches, wheel chair, knee walker etc. Minimizing pressure through partial weight bearing (minimizing the amount of  pressure applied and or the amount of time on the area of pressure) or maintaining a non-weight bearing status can be used to promote and often can be essential for thee wound to heal. Off loading may also need to be achieved for non-weight bearing wounds such as pressure ulcers to the torso. Turning and changing positions frequently, at least every two hours. Use of pressure cushion if sitting up in chair.     Skin Care  Keep skin clean and well moisturized , moisturize routinely with ointments for heavier moisturizer needs for extremely dry skin or cracks such as A&D ointment and lotions for a light moisturizer such as CeraVe or Eucerin. If incontinent change incontinence garments as soon as soiled and keeping skin clean and use barrier cream to protect the skin.    Reduce Salt and Sodium  Choose low- or reduced- sodium, or no-salt-added versions of foods and condiments when available. Buy fresh, plain frozen, or canned with “no-added-salt” vegetables. Use fresh poultry, fish and lean meat, rather than canned, smoked or processed types. Choose ready-to-eat breakfast cereals that are lower in sodium. Limit cured foods (such as barnes and ham), foods packed in brine (such as pickled foods) and condiments (such as MSG, mustard, horseradish, and catsup). Limit even lower sodium versions of soy

## 2024-06-10 ENCOUNTER — HOSPITAL ENCOUNTER (OUTPATIENT)
Dept: WOUND CARE | Age: 34
Discharge: HOME | End: 2024-06-10
Payer: COMMERCIAL

## 2024-06-10 VITALS
SYSTOLIC BLOOD PRESSURE: 106 MMHG | RESPIRATION RATE: 18 BRPM | HEART RATE: 88 BPM | TEMPERATURE: 97.7 F | DIASTOLIC BLOOD PRESSURE: 60 MMHG

## 2024-06-10 DIAGNOSIS — N39.45 CONTINUOUS LEAKAGE OF URINE: Primary | ICD-10-CM

## 2024-06-10 DIAGNOSIS — L89.313 PRESSURE INJURY OF RIGHT ISCHIUM, STAGE 3 (HCC): ICD-10-CM

## 2024-06-10 DIAGNOSIS — T14.8XXA EXCORIATION: ICD-10-CM

## 2024-06-10 DIAGNOSIS — G82.20 PARAPLEGIA (HCC): ICD-10-CM

## 2024-06-10 PROCEDURE — 11042 DBRDMT SUBQ TIS 1ST 20SQCM/<: CPT

## 2024-06-10 PROCEDURE — 11042 DBRDMT SUBQ TIS 1ST 20SQCM/<: CPT | Performed by: NURSE PRACTITIONER

## 2024-06-10 NOTE — PROGRESS NOTES
paraplegic.Change diaper every 4 hours. 120 each 12     No current facility-administered medications on file prior to encounter.       PROBLEM LIST    Patient Active Problem List   Diagnosis    Nevus    Major depressive disorder    Hallucinations    WD-Paraplegia (HCC)    Altered mental status    Other insomnia    Anxiety    Acute gastroenteritis    WD-Excoriation    Pressure injury of right ischium, stage 3 (HCC)    Continuous leakage of urine       REVIEW OF SYSTEMS      Constitutional: negative for anorexia, chills, fatigue, fevers, malaise, and sweats  Respiratory: negative for cough, hemoptysis, pleurisy/chest pain, sputum, and stridor  Cardiovascular: negative for chest pain, chest pressure/discomfort, exertional chest pressure/discomfort, near-syncope, orthopnea, palpitations, paroxysmal nocturnal dyspnea, syncope, and tachypnea  Integument/breast: positive for skin lesion(s)        Objective:      /60   Pulse 88   Temp 97.7 °F (36.5 °C) (Temporal)   Resp 18     BP Readings from Last 3 Encounters:   06/10/24 106/60   05/20/24 104/77   04/22/24 118/70        PHYSICAL EXAM  General Appearance:   Alert, cooperative, no distress, obese   Head:   Normocephalic, without obvious abnormality, atraumatic    Eyes:   PERRL, conjunctiva/corneas clear    Ears:   Normal external appearance, hearing grossly intact    Nose:  Nares normal, mucosa normal, no drainage    Throat:  Lips, mucosa, and tongue normal    Neck:  Supple, trachea midline    Respiratory:   Equal chest rise, respirations unlabored, no wheezing   Cardiovascular:   Regular rate and rhythm    Abdomen, GI:   Soft, non-tender, no rebound or guarding    Musculoskeletal:  Atraumatic, no cyanosis or edema   Neurologic:  Nonfocal, strength & sensation grossly intact   Psychiatric: Oriented x3, grossly appropriate judgement and insight      Dermatologic exam: Visual inspection of the periwound reveals the skin to be moist  Wound exam: see wound description

## 2024-06-10 NOTE — PATIENT INSTRUCTIONS
PHYSICIAN ORDERS AND DISCHARGE INSTRUCTIONS     Wound cleansing:               Do not scrub or use excessive force.              Wash hands with soap and water before and after dressing changes.              Prior to applying a clean dressing, cleanse wound with normal saline,               wound cleanser, or mild soap and water.               Ask the physician or nurse before getting the wound(s) wet in a shower                          Wound Care Notes:                               Orders for this week:  6/10/2024             Fax to [facility]          Right Buttock wound - Wash with soap and water, pat dry.   Apply Puracol Ag, Cover with Silocone Border and Secure with Medipore tape.  Change Monday, Wednesday and Friday      Turn and reposition every 2 hours.     Dispense 30 day quantity when ordering supplies.        Nurse Visit:   Follow Up Instructions: At the Wound Care Center : 4 week: Monday  Primary Wound Care Provider: Nancy Delarosa CNP   Call  for any questions or concerns.  Central [phone] for imaging and lab work

## 2024-07-08 ENCOUNTER — HOSPITAL ENCOUNTER (OUTPATIENT)
Dept: WOUND CARE | Age: 34
Discharge: HOME | End: 2024-07-08

## 2024-07-08 NOTE — PATIENT INSTRUCTIONS
PHYSICIAN ORDERS AND DISCHARGE INSTRUCTIONS     Wound cleansing:               Do not scrub or use excessive force.              Wash hands with soap and water before and after dressing changes.              Prior to applying a clean dressing, cleanse wound with normal saline,               wound cleanser, or mild soap and water.               Ask the physician or nurse before getting the wound(s) wet in a shower                          Wound Care Notes:                               Orders for this week:  2024             Fax to [facility]          Right Buttock wound - Wash with soap and water, pat dry.   Apply Puracol Ag, Cover with Silocone Border and Secure with Medipore tape.  Change Monday, Wednesday and Friday      Turn and reposition every 2 hours.     Dispense 30 day quantity when ordering supplies.        Nurse Visit:   Follow Up Instructions: At the Wound Care Center : 61 days   Primary Wound Care Provider: Nancy Delarosa CNP   Call  for any questions or concerns.  Central [phone] for imaging and lab work

## 2024-08-06 ENCOUNTER — HOSPITAL ENCOUNTER (OUTPATIENT)
Dept: WOUND CARE | Age: 34
Discharge: HOME | End: 2024-08-06
Payer: COMMERCIAL

## 2024-08-06 VITALS
TEMPERATURE: 97.9 F | RESPIRATION RATE: 18 BRPM | HEART RATE: 78 BPM | SYSTOLIC BLOOD PRESSURE: 109 MMHG | DIASTOLIC BLOOD PRESSURE: 76 MMHG

## 2024-08-06 DIAGNOSIS — S70.322A: ICD-10-CM

## 2024-08-06 DIAGNOSIS — L89.313 PRESSURE INJURY OF RIGHT ISCHIUM, STAGE 3 (HCC): Primary | ICD-10-CM

## 2024-08-06 DIAGNOSIS — N39.45 CONTINUOUS LEAKAGE OF URINE: ICD-10-CM

## 2024-08-06 DIAGNOSIS — G82.20 PARAPLEGIA (HCC): ICD-10-CM

## 2024-08-06 PROCEDURE — 11042 DBRDMT SUBQ TIS 1ST 20SQCM/<: CPT

## 2024-08-06 PROCEDURE — 11045 DBRDMT SUBQ TISS EACH ADDL: CPT | Performed by: NURSE PRACTITIONER

## 2024-08-06 PROCEDURE — 11045 DBRDMT SUBQ TISS EACH ADDL: CPT

## 2024-08-06 PROCEDURE — 11042 DBRDMT SUBQ TIS 1ST 20SQCM/<: CPT | Performed by: NURSE PRACTITIONER

## 2024-08-06 NOTE — PROGRESS NOTES
water, avoid soda. Continue to increase level of physical activity. Refer to weight management as indicated and requested by patient.          PAST MEDICAL HISTORY        Diagnosis Date    Anxiety     Back pain     Depression     Encephalitis     Hypertension     Meningitis     Paraplegia (HCC)     Suicidal ideation     Urinary incontinence        PAST SURGICAL HISTORY    Past Surgical History:   Procedure Laterality Date    PRE-MALIGNANT / BENIGN SKIN LESION EXCISION      2015 left shoulder       FAMILY HISTORY    Family History   Family history unknown: Yes       SOCIAL HISTORY    Social History     Tobacco Use    Smoking status: Never    Smokeless tobacco: Never   Vaping Use    Vaping Use: Never used   Substance Use Topics    Alcohol use: No    Drug use: No       ALLERGIES    Allergies   Allergen Reactions    Fish-Derived Products     Lemon Extract [Flavoring Agent]     Milk-Related Compounds     Trazodone And Nefazodone      Causes nightmares         MEDICATIONS    Current Outpatient Medications on File Prior to Encounter   Medication Sig Dispense Refill    clobetasol (TEMOVATE) 0.05 % ointment Apply topically 2 times daily. 60 g 0    busPIRone (BUSPAR) 5 MG tablet Take 1 tablet by mouth 3 times daily      nystatin (MYCOSTATIN) 507600 UNIT/ML suspension Take 5 mLs by mouth 4 times daily Swish and swallow 5 ml, 4 times a day, 8am, 12pm, 4pm, 8pm. 140 mL 0    citalopram (CELEXA) 20 MG tablet Take 1 tablet by mouth daily (Patient taking differently: Take 1.5 tablets by mouth daily) 30 tablet 0    hydrOXYzine (VISTARIL) 25 MG capsule Take 1 capsule by mouth 2 times daily 60 capsule 3    white petrolatum (VASELINE) GEL Apply 28 g topically as needed for Dry Skin 212 g 3    ibuprofen (ADVIL;MOTRIN) 800 MG tablet Take 1 tablet by mouth every 6 hours as needed for Pain 120 tablet 1    Diapers & Supplies MISC XL diapers. Patient prefers ones that has sticky tabs on sides since she is a paraplegic.Change diaper every 4

## 2024-08-06 NOTE — PATIENT INSTRUCTIONS
PHYSICIAN ORDERS AND DISCHARGE INSTRUCTIONS     Wound cleansing:               Do not scrub or use excessive force.              Wash hands with soap and water before and after dressing changes.              Prior to applying a clean dressing, cleanse wound with normal saline,               wound cleanser, or mild soap and water.               Ask the physician or nurse before getting the wound(s) wet in a shower                          Wound Care Notes:                               Orders for this week:  2024             Fax to [facility]          Right and Left Buttock wound - Wash with soap and water, pat dry.   Apply stimulen powder and saline damp hydraferra blue cut to fit wound bed   Cover with Silocone Border and Secure with Medipore tape.  Change Monday, Wednesday and Friday      Turn and reposition every 2 hours.     Dispense 30 day quantity when ordering supplies.        Nurse Visit:   Follow Up Instructions: At the Wound Care Center : 61 days   Primary Wound Care Provider: Nancy Delarosa CNP   Call  for any questions or concerns.  Central [phone] for imaging and lab work

## 2024-08-27 ENCOUNTER — HOSPITAL ENCOUNTER (OUTPATIENT)
Dept: WOUND CARE | Age: 34
Discharge: HOME | End: 2024-08-27
Attending: NURSE PRACTITIONER
Payer: COMMERCIAL

## 2024-08-27 VITALS — HEART RATE: 80 BPM | TEMPERATURE: 97.2 F | RESPIRATION RATE: 16 BRPM

## 2024-08-27 DIAGNOSIS — N39.45 CONTINUOUS LEAKAGE OF URINE: ICD-10-CM

## 2024-08-27 DIAGNOSIS — T14.8XXA EXCORIATION: ICD-10-CM

## 2024-08-27 DIAGNOSIS — L89.313 PRESSURE INJURY OF RIGHT ISCHIUM, STAGE 3 (HCC): ICD-10-CM

## 2024-08-27 DIAGNOSIS — L89.212 DECUBITUS ULCER OF RIGHT THIGH, STAGE 2 (HCC): ICD-10-CM

## 2024-08-27 DIAGNOSIS — G82.20 PARAPLEGIA (HCC): Primary | ICD-10-CM

## 2024-08-27 PROCEDURE — 11042 DBRDMT SUBQ TIS 1ST 20SQCM/<: CPT

## 2024-08-27 PROCEDURE — 11045 DBRDMT SUBQ TISS EACH ADDL: CPT

## 2024-08-27 NOTE — PROGRESS NOTES
hair growth lower legs, thickening, discolored toenails,rubor, burning, edema and pain with ambulation L60.2. No primary care provider on file..   [] Apply for medical grade compression garments  [] Apply for lymphedema pumps  [] Referral to other healthcare providers  [] Review of  prior external notes  [] Review of the results of each unique test  [] Communication with other healthcare providers  [] Discussion regarding hospitalization  [] Referral to Home Health for assistance with wound care in the home  [] Independently interpreting results   [] Communicating results to the patient/family/caregiver  [] Coordinate of care   [] Counseling and education to patient/family/caregiver    Follow up scheduled:  [] One week  [x] Four weeks  [] 61 days  [] As needed  [] Discharged     Discussed:  [] Diabetic management  [] Edema management  [] Smoking cessation  [x] Weight management  [x] Pressure reduction  [] Incontinence management and skin care  [] Local wound care  [] Infection Prevention  [] S&S worsening wound status and symptoms that require further evaluation per PCP and/or emergency department  [] Use, action and side effects of medication  [] Bleeding precautions    Nurse visit at the wound clinic: [] Yes [x]  No    [] The patient will need a nurse visit at the wound clinic for additional dressing change at this time related to:     Frequency of nursing visit needed:    []  One time per week  []  Two times a week  [] Three times weekly    Etiology for Nurse Visit:    [] Heavy drainage, unable to get adequate supplies to manage drainage in the home  [] Complexity of wound regimen unable to be achieved in the home environment  [] Unable to obtain wound care supplies in the home related to insurance coverage or financial inability  [] Unable to obtain Home Health coverage for the patient related to payor source or staffing issues  [] Patient not homebound thus Home Health is not an option  [] Dressing that can't

## 2024-08-27 NOTE — PATIENT INSTRUCTIONS
PHYSICIAN ORDERS AND DISCHARGE INSTRUCTIONS     Wound cleansing:               Do not scrub or use excessive force.              Wash hands with soap and water before and after dressing changes.              Prior to applying a clean dressing, cleanse wound with normal saline,               wound cleanser, or mild soap and water.               Ask the physician or nurse before getting the wound(s) wet in a shower                          Wound Care Notes:                               Orders for this week:  2024             Fax to [facility]          Right medial upper thigh, Right and Left Buttock wound - Wash with soap and water, pat dry.   Apply stimulen powder and saline damp hydrofera blue cut to fit wound bed   Cover with Silocone Border and Secure with Medipore tape.  Change Monday, Wednesday and Friday      Turn and reposition every 2 hours.     Dispense  day quantity when ordering supplies.        Nurse Visit:   Follow Up Instructions: At the Wound Care Center : 1 month  Primary Wound Care Provider: Nancy Delarosa CNP   Call  for any questions or concerns.  Central [phone] for imaging and lab work

## 2024-09-30 ENCOUNTER — HOSPITAL ENCOUNTER (OUTPATIENT)
Dept: WOUND CARE | Age: 34
Discharge: HOME | End: 2024-09-30
Attending: NURSE PRACTITIONER
Payer: COMMERCIAL

## 2024-09-30 VITALS
TEMPERATURE: 97.5 F | SYSTOLIC BLOOD PRESSURE: 133 MMHG | HEART RATE: 95 BPM | RESPIRATION RATE: 18 BRPM | DIASTOLIC BLOOD PRESSURE: 75 MMHG

## 2024-09-30 DIAGNOSIS — L89.313 PRESSURE INJURY OF RIGHT ISCHIUM, STAGE 3 (HCC): ICD-10-CM

## 2024-09-30 DIAGNOSIS — N39.45 CONTINUOUS LEAKAGE OF URINE: Primary | ICD-10-CM

## 2024-09-30 DIAGNOSIS — S70.322A: ICD-10-CM

## 2024-09-30 PROCEDURE — 11042 DBRDMT SUBQ TIS 1ST 20SQCM/<: CPT

## 2024-09-30 PROCEDURE — 11042 DBRDMT SUBQ TIS 1ST 20SQCM/<: CPT | Performed by: NURSE PRACTITIONER

## 2024-09-30 ASSESSMENT — PAIN DESCRIPTION - ONSET: ONSET: ON-GOING

## 2024-09-30 ASSESSMENT — PAIN DESCRIPTION - LOCATION: LOCATION: BUTTOCKS

## 2024-09-30 ASSESSMENT — PAIN - FUNCTIONAL ASSESSMENT: PAIN_FUNCTIONAL_ASSESSMENT: PREVENTS OR INTERFERES SOME ACTIVE ACTIVITIES AND ADLS

## 2024-09-30 ASSESSMENT — PAIN DESCRIPTION - DESCRIPTORS: DESCRIPTORS: STABBING

## 2024-09-30 ASSESSMENT — PAIN SCALES - GENERAL: PAINLEVEL_OUTOF10: 2

## 2024-09-30 ASSESSMENT — PAIN DESCRIPTION - ORIENTATION: ORIENTATION: MID

## 2024-09-30 ASSESSMENT — PAIN DESCRIPTION - FREQUENCY: FREQUENCY: INTERMITTENT

## 2024-09-30 ASSESSMENT — PAIN DESCRIPTION - PAIN TYPE: TYPE: CHRONIC PAIN

## 2024-09-30 NOTE — PROGRESS NOTES
other healthcare providers  [] Review of  prior external notes  [] Review of the results of each unique test  [] Communication with other healthcare providers  [] Discussion regarding hospitalization  [] Referral to Home Health for assistance with wound care in the home  [] Independently interpreting results   [] Communicating results to the patient/family/caregiver  [x] Coordinate of care   [x] Counseling and education to patient/family/caregiver    Follow up scheduled:  [] One week  [x] Two weeks  [] 61 days  [] As needed  [] Discharged     Discussed:  [] Diabetic management  [] Edema management  [] Smoking cessation  [x] Weight management  [x] Pressure reduction  [x] Incontinence management and skin care  [] Local wound care  [] Infection Prevention  [] S&S worsening wound status and symptoms that require further evaluation per PCP and/or emergency department  [] Use, action and side effects of medication  [] Bleeding precautions    Nurse visit at the wound clinic: [] Yes [x]  No    [] The patient will need a nurse visit at the wound clinic for additional dressing change at this time related to:     Frequency of nursing visit needed:    []  One time per week  []  Two times a week  [] Three times weekly    Etiology for Nurse Visit:    [] Heavy drainage, unable to get adequate supplies to manage drainage in the home  [] Complexity of wound regimen unable to be achieved in the home environment  [] Unable to obtain wound care supplies in the home related to insurance coverage or financial inability  [] Unable to obtain Home Health coverage for the patient related to payor source or staffing issues  [] Patient not homebound thus Home Health is not an option  [] Dressing that can't be completed in the home by Home Health or by the patient ( [] Liquid silver nitrate, [] Total contact cast)  [] Supplies not available in the home to complete wound care as ordered by the provider     Plan:     Discharge 
Home

## 2024-09-30 NOTE — PATIENT INSTRUCTIONS
PHYSICIAN ORDERS AND DISCHARGE INSTRUCTIONS     Wound cleansing:               Do not scrub or use excessive force.              Wash hands with soap and water before and after dressing changes.              Prior to applying a clean dressing, cleanse wound with normal saline,               wound cleanser, or mild soap and water.               Ask the physician or nurse before getting the wound(s) wet in a shower                          Wound Care Notes:                               Orders for this week:  2024             Fax to [facility]          Right medial upper thigh, Right and Left Buttock wound - Wash with soap and water, pat dry.   Apply Desitin, Clobetasol, stimulen powder to the wound bed.   Cover with Sacral Silicone Island Border and Secure with Tegaderm and Medipore tape  Change Monday, Wednesday and Friday      Turn and reposition every 2 hours.     Dispense  day quantity when ordering supplies.        Follow Up Instructions: At the Wound Care Center : 2 weeks  Primary Wound Care Provider: Nancy Delarosa CNP   Call  for any questions or concerns.  Central [phone] for imaging and lab work

## 2024-10-14 ENCOUNTER — HOSPITAL ENCOUNTER (OUTPATIENT)
Dept: WOUND CARE | Age: 34
Discharge: HOME | End: 2024-10-14
Attending: NURSE PRACTITIONER
Payer: COMMERCIAL

## 2024-10-14 VITALS
RESPIRATION RATE: 16 BRPM | DIASTOLIC BLOOD PRESSURE: 74 MMHG | TEMPERATURE: 96.5 F | HEART RATE: 86 BPM | SYSTOLIC BLOOD PRESSURE: 113 MMHG

## 2024-10-14 DIAGNOSIS — L89.313 PRESSURE INJURY OF RIGHT ISCHIUM, STAGE 3 (HCC): Primary | ICD-10-CM

## 2024-10-14 DIAGNOSIS — G82.20 PARAPLEGIA (HCC): ICD-10-CM

## 2024-10-14 DIAGNOSIS — N39.45 CONTINUOUS LEAKAGE OF URINE: ICD-10-CM

## 2024-10-14 PROCEDURE — 11042 DBRDMT SUBQ TIS 1ST 20SQCM/<: CPT

## 2024-10-14 PROCEDURE — 11042 DBRDMT SUBQ TIS 1ST 20SQCM/<: CPT | Performed by: NURSE PRACTITIONER

## 2024-10-14 ASSESSMENT — PAIN SCALES - GENERAL: PAINLEVEL_OUTOF10: 0

## 2024-10-14 NOTE — PROGRESS NOTES
[phone] for any questions or concerns.  Central [phone] for imaging and lab work      Treatment Note Wound 24 Right #1 right buttocks cluster-Dressing/Treatment:  (Applied Stimulen and anasept dampened Hydrofera blue cut to fit wound.   Covered with  Silicone Island Border and Secure with Large Tegaderm.)    Written Patient Dismissal Instructions Given            Electronically signed by PRECIOUS Echols CNP on 10/14/2024 at 1:30 PM

## 2024-10-14 NOTE — PATIENT INSTRUCTIONS
PHYSICIAN ORDERS AND DISCHARGE INSTRUCTIONS     Wound cleansing:               Do not scrub or use excessive force.              Wash hands with soap and water before and after dressing changes.              Prior to applying a clean dressing, cleanse wound with normal saline,               wound cleanser, or mild soap and water.               Ask the physician or nurse before getting the wound(s) wet in a shower                          Wound Care Notes:                               Orders for this week:  10/14/2024             Fax to [facility]          Right medial upper thigh, Right and Left Buttock wound - Wash with soap and water, pat dry.   Apply Stimulen and saline (or anasept spray) dampened Hydrofera blue cut to fit wound.   Cover with Sacral Silicone Island Border and Secure with Large Tegaderm.  Change Monday, Wednesday and Friday    Turn and reposition every 2 hours.     Dispense  day quantity when ordering supplies.        Follow Up Instructions: At the Wound Care Center : 2 weeks  Primary Wound Care Provider: Nancy Delarosa CNP   Call  for any questions or concerns.  Central [phone] for imaging and lab work

## 2024-10-14 NOTE — PLAN OF CARE
Demographics info for supply order 10/14/24.  Electronically signed by Phillip Gale RN on 10/14/2024 at 1:36 PM      Patient Info:    Kalen Wing  [address]  [id number]  [place] [facility] [place] [address]  [phone]  : 1990  Age: 34 y.o.  Gender: female  Todays Date: 10/14/2024    Insurance Info:    Plan: [employer]  Coverage: [employer]) PLAN  Effective Date: 2020  Group Number: [unfilled]  Subscriber Number: [id number] - (Medicaid Managed)    Payer/Plan Subscr  Sex Relation Sub. Ins. ID Effective Group Num   1. [place] [facility]* LE WING* 1990 Female Self [id number]                                    [address]

## 2024-10-22 ENCOUNTER — HOSPITAL ENCOUNTER (OUTPATIENT)
Dept: WOUND CARE | Age: 34
Discharge: HOME | End: 2024-10-22
Attending: NURSE PRACTITIONER

## 2024-10-22 NOTE — PATIENT INSTRUCTIONS
PHYSICIAN ORDERS AND DISCHARGE INSTRUCTIONS     Wound cleansing:               Do not scrub or use excessive force.              Wash hands with soap and water before and after dressing changes.              Prior to applying a clean dressing, cleanse wound with normal saline,               wound cleanser, or mild soap and water.               Ask the physician or nurse before getting the wound(s) wet in a shower                          Wound Care Notes:                               Orders for this week:  10/22/2024             Fax to [facility]          Right medial upper thigh, Right and Left Buttock wound - Wash with soap and water, pat dry.   Apply Stimulen and saline (or anasept spray) dampened Hydrofera blue cut to fit wound.   Cover with Sacral Silicone Island Border and Secure with Large Tegaderm.  Change Monday, Wednesday and Friday    Turn and reposition every 2 hours.     Dispense  day quantity when ordering supplies.        Follow Up Instructions: At the Wound Care Center : 2 weeks  Primary Wound Care Provider: Nancy Delarosa CNP   Call  for any questions or concerns.  Central [phone] for imaging and lab work

## 2024-11-04 ENCOUNTER — HOSPITAL ENCOUNTER (OUTPATIENT)
Dept: WOUND CARE | Age: 34
Discharge: HOME | End: 2024-11-04
Attending: NURSE PRACTITIONER
Payer: COMMERCIAL

## 2024-11-04 VITALS
TEMPERATURE: 96 F | DIASTOLIC BLOOD PRESSURE: 80 MMHG | SYSTOLIC BLOOD PRESSURE: 124 MMHG | HEART RATE: 94 BPM | RESPIRATION RATE: 18 BRPM

## 2024-11-04 DIAGNOSIS — T14.8XXA EXCORIATION: ICD-10-CM

## 2024-11-04 DIAGNOSIS — L89.313 PRESSURE INJURY OF RIGHT ISCHIUM, STAGE 3 (HCC): ICD-10-CM

## 2024-11-04 DIAGNOSIS — G82.20 PARAPLEGIA (HCC): ICD-10-CM

## 2024-11-04 DIAGNOSIS — N39.45 CONTINUOUS LEAKAGE OF URINE: Primary | ICD-10-CM

## 2024-11-04 PROBLEM — L89.212 DECUBITUS ULCER OF RIGHT THIGH, STAGE 2 (HCC): Status: RESOLVED | Noted: 2024-08-27 | Resolved: 2024-11-04

## 2024-11-04 PROCEDURE — 11042 DBRDMT SUBQ TIS 1ST 20SQCM/<: CPT

## 2024-11-04 PROCEDURE — 6370000000 HC RX 637 (ALT 250 FOR IP): Performed by: NURSE PRACTITIONER

## 2024-11-04 PROCEDURE — 11042 DBRDMT SUBQ TIS 1ST 20SQCM/<: CPT | Performed by: NURSE PRACTITIONER

## 2024-11-04 RX ORDER — SILVER SULFADIAZINE 10 MG/G
CREAM TOPICAL ONCE
OUTPATIENT
Start: 2024-11-04 | End: 2024-11-04

## 2024-11-04 RX ORDER — CLOBETASOL PROPIONATE 0.5 MG/G
OINTMENT TOPICAL ONCE
OUTPATIENT
Start: 2024-11-04 | End: 2024-11-04

## 2024-11-04 RX ORDER — MUPIROCIN 20 MG/G
OINTMENT TOPICAL ONCE
OUTPATIENT
Start: 2024-11-04 | End: 2024-11-04

## 2024-11-04 RX ORDER — GENTAMICIN SULFATE 1 MG/G
OINTMENT TOPICAL ONCE
OUTPATIENT
Start: 2024-11-04 | End: 2024-11-04

## 2024-11-04 RX ORDER — BACITRACIN ZINC 500 [USP'U]/G
OINTMENT TOPICAL ONCE
OUTPATIENT
Start: 2024-11-04 | End: 2024-11-04

## 2024-11-04 RX ORDER — BACITRACIN ZINC AND POLYMYXIN B SULFATE 500; 1000 [USP'U]/G; [USP'U]/G
OINTMENT TOPICAL ONCE
OUTPATIENT
Start: 2024-11-04 | End: 2024-11-04

## 2024-11-04 RX ORDER — BETAMETHASONE DIPROPIONATE 0.5 MG/G
CREAM TOPICAL ONCE
OUTPATIENT
Start: 2024-11-04 | End: 2024-11-04

## 2024-11-04 RX ORDER — NEOMYCIN/BACITRACIN/POLYMYXINB 3.5-400-5K
OINTMENT (GRAM) TOPICAL ONCE
OUTPATIENT
Start: 2024-11-04 | End: 2024-11-04

## 2024-11-04 RX ORDER — LIDOCAINE 40 MG/G
CREAM TOPICAL ONCE
OUTPATIENT
Start: 2024-11-04 | End: 2024-11-04

## 2024-11-04 RX ORDER — CLOBETASOL PROPIONATE 0.5 MG/G
OINTMENT TOPICAL ONCE
Status: COMPLETED | OUTPATIENT
Start: 2024-11-04 | End: 2024-11-04

## 2024-11-04 RX ORDER — TRIAMCINOLONE ACETONIDE 1 MG/G
OINTMENT TOPICAL ONCE
OUTPATIENT
Start: 2024-11-04 | End: 2024-11-04

## 2024-11-04 RX ORDER — LIDOCAINE HYDROCHLORIDE 40 MG/ML
SOLUTION TOPICAL ONCE
OUTPATIENT
Start: 2024-11-04 | End: 2024-11-04

## 2024-11-04 RX ORDER — LIDOCAINE HYDROCHLORIDE 20 MG/ML
JELLY TOPICAL ONCE
OUTPATIENT
Start: 2024-11-04 | End: 2024-11-04

## 2024-11-04 RX ORDER — LIDOCAINE 50 MG/G
OINTMENT TOPICAL ONCE
OUTPATIENT
Start: 2024-11-04 | End: 2024-11-04

## 2024-11-04 RX ORDER — SODIUM CHLOR/HYPOCHLOROUS ACID 0.033 %
SOLUTION, IRRIGATION IRRIGATION ONCE
OUTPATIENT
Start: 2024-11-04 | End: 2024-11-04

## 2024-11-04 RX ADMIN — CLOBETASOL PROPIONATE: 0.5 OINTMENT TOPICAL at 13:38

## 2024-11-04 ASSESSMENT — PAIN SCALES - GENERAL: PAINLEVEL_OUTOF10: 0

## 2024-11-04 NOTE — PATIENT INSTRUCTIONS
PHYSICIAN ORDERS AND DISCHARGE INSTRUCTIONS     Wound cleansing:               Do not scrub or use excessive force.              Wash hands with soap and water before and after dressing changes.              Prior to applying a clean dressing, cleanse wound with normal saline,               wound cleanser, or mild soap and water.               Ask the physician or nurse before getting the wound(s) wet in a shower                          Wound Care Notes:                               Orders for this week:  2024             Fax to [facility]          Right  Buttock cluster - Wash with soap and water, pat dry.   Apply Desitin clobetasol and Stimulen to wound.   Cover with Sacral Silicone Island Border and Secure with Large Tegaderm.  Change Monday, Wednesday and Friday    Turn and reposition every 2 hours.     Dispense 30 day quantity when ordering supplies.        Follow Up Instructions: At the Wound Care Center : 2 weeks  Primary Wound Care Provider: Nancy Delarosa CNP   Call  for any questions or concerns.  Central [phone] for imaging and lab work

## 2024-11-04 NOTE — PROGRESS NOTES
Wound Care Center Progress Visit      Kalen Levy  AGE: 34 y.o.   GENDER: female  : 1990  EPISODE DATE:  2024   Referred by: Kenya Valera CNP    Subjective:     CHIEF COMPLAINT  WOUND   Problem List Items Addressed This Visit          Other    WD-Paraplegia (HCC)    Relevant Orders    Initiate Outpatient Wound Care Protocol    WD-Excoriation    Relevant Orders    Initiate Outpatient Wound Care Protocol    Pressure injury of right ischium, stage 3 (HCC)    Relevant Orders    Initiate Outpatient Wound Care Protocol    Continuous leakage of urine - Primary    Relevant Orders    Initiate Outpatient Wound Care Protocol     Chief Complaint   Patient presents with    Wound Check        HISTORY of PRESENT ILLNESS      Kalen Levy is a 34 y.o. 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 The condition is of moderate severity. The area is recurrent in nature and they have working on it on and off for several months, she was last at the wound clinic 24.  The underlying cause is thought to be pressure and moisture. The patients care to date has included foam dressings. The patient has significant underlying medical conditions as below.     Wound Pain Timing/Severity: intermittent, mild  Quality of pain: tender  Severity of pain:  1 / 10   Modifying Factors: chronic pressure, decreased mobility, shear force, and obesity  Associated Signs/Symptoms: drainage and pain    KEVIN: as indicated base on wound location and assessment    Wound infection: wound culture will be obtained as needed for symptoms of infection     Culture   Date Value Ref Range Status   2024 Final Report No anaerobes isolated  Final   2024 PSEUDOMONAS AERUGINOSA Light growth (A)  Final   2024 ENTEROCOCCUS FAECALIS Light growth (A)  Final      Arterial evaluation: if indicated based on wound, location, symptoms and

## 2024-11-18 ENCOUNTER — HOSPITAL ENCOUNTER (OUTPATIENT)
Dept: WOUND CARE | Age: 34
Discharge: HOME | End: 2024-11-18
Attending: NURSE PRACTITIONER
Payer: COMMERCIAL

## 2024-11-18 VITALS
TEMPERATURE: 97.1 F | HEART RATE: 93 BPM | SYSTOLIC BLOOD PRESSURE: 108 MMHG | DIASTOLIC BLOOD PRESSURE: 74 MMHG | RESPIRATION RATE: 18 BRPM

## 2024-11-18 DIAGNOSIS — T14.8XXA SKIN EXCORIATION: ICD-10-CM

## 2024-11-18 DIAGNOSIS — N39.45 CONTINUOUS LEAKAGE OF URINE: ICD-10-CM

## 2024-11-18 DIAGNOSIS — G82.20 PARAPLEGIA (HCC): ICD-10-CM

## 2024-11-18 DIAGNOSIS — L89.313 PRESSURE INJURY OF RIGHT ISCHIUM, STAGE 3 (HCC): Primary | ICD-10-CM

## 2024-11-18 PROCEDURE — 11042 DBRDMT SUBQ TIS 1ST 20SQCM/<: CPT

## 2024-11-18 PROCEDURE — 11042 DBRDMT SUBQ TIS 1ST 20SQCM/<: CPT | Performed by: NURSE PRACTITIONER

## 2024-11-18 PROCEDURE — 6370000000 HC RX 637 (ALT 250 FOR IP): Performed by: NURSE PRACTITIONER

## 2024-11-18 RX ORDER — CLOBETASOL PROPIONATE 0.5 MG/G
OINTMENT TOPICAL ONCE
OUTPATIENT
Start: 2024-11-18 | End: 2024-11-18

## 2024-11-18 RX ORDER — GENTAMICIN SULFATE 1 MG/G
OINTMENT TOPICAL ONCE
OUTPATIENT
Start: 2024-11-18 | End: 2024-11-18

## 2024-11-18 RX ORDER — BETAMETHASONE DIPROPIONATE 0.5 MG/G
CREAM TOPICAL ONCE
OUTPATIENT
Start: 2024-11-18 | End: 2024-11-18

## 2024-11-18 RX ORDER — BACITRACIN ZINC AND POLYMYXIN B SULFATE 500; 1000 [USP'U]/G; [USP'U]/G
OINTMENT TOPICAL ONCE
OUTPATIENT
Start: 2024-11-18 | End: 2024-11-18

## 2024-11-18 RX ORDER — BACITRACIN ZINC 500 [USP'U]/G
OINTMENT TOPICAL ONCE
OUTPATIENT
Start: 2024-11-18 | End: 2024-11-18

## 2024-11-18 RX ORDER — SILVER SULFADIAZINE 10 MG/G
CREAM TOPICAL ONCE
OUTPATIENT
Start: 2024-11-18 | End: 2024-11-18

## 2024-11-18 RX ORDER — LIDOCAINE HYDROCHLORIDE 20 MG/ML
JELLY TOPICAL ONCE
OUTPATIENT
Start: 2024-11-18 | End: 2024-11-18

## 2024-11-18 RX ORDER — LIDOCAINE HYDROCHLORIDE 40 MG/ML
SOLUTION TOPICAL ONCE
OUTPATIENT
Start: 2024-11-18 | End: 2024-11-18

## 2024-11-18 RX ORDER — SODIUM CHLOR/HYPOCHLOROUS ACID 0.033 %
SOLUTION, IRRIGATION IRRIGATION ONCE
OUTPATIENT
Start: 2024-11-18 | End: 2024-11-18

## 2024-11-18 RX ORDER — MUPIROCIN 20 MG/G
OINTMENT TOPICAL ONCE
OUTPATIENT
Start: 2024-11-18 | End: 2024-11-18

## 2024-11-18 RX ORDER — LIDOCAINE 50 MG/G
OINTMENT TOPICAL ONCE
OUTPATIENT
Start: 2024-11-18 | End: 2024-11-18

## 2024-11-18 RX ORDER — LIDOCAINE 40 MG/G
CREAM TOPICAL ONCE
OUTPATIENT
Start: 2024-11-18 | End: 2024-11-18

## 2024-11-18 RX ORDER — NEOMYCIN/BACITRACIN/POLYMYXINB 3.5-400-5K
OINTMENT (GRAM) TOPICAL ONCE
OUTPATIENT
Start: 2024-11-18 | End: 2024-11-18

## 2024-11-18 RX ORDER — CLOBETASOL PROPIONATE 0.5 MG/G
OINTMENT TOPICAL ONCE
Status: COMPLETED | OUTPATIENT
Start: 2024-11-18 | End: 2024-11-18

## 2024-11-18 RX ORDER — TRIAMCINOLONE ACETONIDE 1 MG/G
OINTMENT TOPICAL ONCE
OUTPATIENT
Start: 2024-11-18 | End: 2024-11-18

## 2024-11-18 RX ADMIN — CLOBETASOL PROPIONATE: 0.5 OINTMENT TOPICAL at 13:36

## 2024-11-18 ASSESSMENT — PAIN SCALES - GENERAL: PAINLEVEL_OUTOF10: 0

## 2024-11-18 NOTE — PATIENT INSTRUCTIONS
PHYSICIAN ORDERS AND DISCHARGE INSTRUCTIONS     Wound cleansing:               Do not scrub or use excessive force.              Wash hands with soap and water before and after dressing changes.              Prior to applying a clean dressing, cleanse wound with normal saline,               wound cleanser, or mild soap and water.               Ask the physician or nurse before getting the wound(s) wet in a shower                          Wound Care Notes:                               Orders for this week:  2024             Fax to [facility]          Right  Buttock cluster - Wash with soap and water, pat dry.   Apply Desitin clobetasol and Stimulen to wound.   Cover with Sacral Silicone Island Border and Secure with Large Tegaderm.  Change Monday, Wednesday and Friday    Turn and reposition every 2 hours.     Dispense 30 day quantity when ordering supplies.        Follow Up Instructions: At the Wound Care Center : 4 weeks  Primary Wound Care Provider: Nancy Delarosa CNP   Call  for any questions or concerns.  Central [phone] for imaging and lab work

## 2024-11-19 PROBLEM — T14.8XXA SKIN EXCORIATION: Status: ACTIVE | Noted: 2024-11-19

## 2024-11-19 NOTE — PROGRESS NOTES
Wound Care Center Progress Visit      Kalen Levy  AGE: 34 y.o.   GENDER: female  : 1990  EPISODE DATE:  2024   Referred by: Kenya Valera CNP    Subjective:     CHIEF COMPLAINT  WOUND   Problem List Items Addressed This Visit          Other    WD-Paraplegia (HCC)    Pressure injury of right ischium, stage 3 (HCC) - Primary    Continuous leakage of urine    Skin excoriation     Chief Complaint   Patient presents with    Wound Check        HISTORY of PRESENT ILLNESS      Kalen Levy is a 34 y.o. 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 The condition is of moderate severity. The area is recurrent in nature and they have working on it on and off for several months, she was last at the wound clinic 24.  The underlying cause is thought to be pressure and moisture. The patients care to date has included foam dressings. The patient has significant underlying medical conditions as below.     Wound Pain Timing/Severity: intermittent, mild  Quality of pain: tender  Severity of pain:  1 / 10   Modifying Factors: chronic pressure, decreased mobility, shear force, and obesity  Associated Signs/Symptoms: drainage and pain      Wound status: 24     Regimen discussed and established with patient/family as below. The patients records were reviewed and discussed.  Time was given for questions. All questions were answered to the patients satisfaction.      [x] Stable [] Improved [] Worse [] Initial visit [x]  Revisit []  Healed    [] Adjustments made to regimen of care  [x] Off loading regimen  [] Compression regimen  [] Wound Vac Therapy  [] Increased frequency of dressing change  [] Revaluation of the wound in a shorter interval to assess effectiveness of changes in care  [] Wound culture obtained  [] Antibiotic therapy added based on current and/or previous culture results  [] Medication(s)

## 2024-12-16 ENCOUNTER — HOSPITAL ENCOUNTER (OUTPATIENT)
Dept: WOUND CARE | Age: 34
Discharge: HOME | End: 2024-12-16
Attending: NURSE PRACTITIONER

## 2024-12-16 NOTE — PATIENT INSTRUCTIONS
PHYSICIAN ORDERS AND DISCHARGE INSTRUCTIONS     Wound cleansing:               Do not scrub or use excessive force.              Wash hands with soap and water before and after dressing changes.              Prior to applying a clean dressing, cleanse wound with normal saline,               wound cleanser, or mild soap and water.               Ask the physician or nurse before getting the wound(s) wet in a shower                          Wound Care Notes:                               Orders for this week:  2024             Fax to [facility]          Right  Buttock cluster - Wash with soap and water, pat dry.   Apply Desitin clobetasol and Stimulen to wound.   Cover with Sacral Silicone [place] Border and Secure with Large Tegaderm.  Change Monday, Wednesday and Friday    Turn and reposition every 2 hours.     Dispense 30 day quantity when ordering supplies.        Follow Up Instructions: At the Wound Care Center : 4 weeks  Primary Wound Care Provider: Nancy Delarosa CNP   Call  for any questions or concerns.  Central [phone] for imaging and lab work

## 2025-01-06 ENCOUNTER — HOSPITAL ENCOUNTER (OUTPATIENT)
Dept: WOUND CARE | Age: 35
Discharge: HOME | End: 2025-01-06
Attending: NURSE PRACTITIONER

## 2025-01-06 NOTE — PATIENT INSTRUCTIONS
PHYSICIAN ORDERS AND DISCHARGE INSTRUCTIONS     Wound cleansing:               Do not scrub or use excessive force.              Wash hands with soap and water before and after dressing changes.              Prior to applying a clean dressing, cleanse wound with normal saline,               wound cleanser, or mild soap and water.               Ask the physician or nurse before getting the wound(s) wet in a shower                          Wound Care Notes:                               Orders for this week:  2025             Fax to [facility]          Right  Buttock cluster - Wash with soap and water, pat dry.   Apply Desitin clobetasol and Stimulen to wound.   Cover with Sacral Silicone [place] Border and Secure with Large Tegaderm.  Change Monday, Wednesday and Friday    Turn and reposition every 2 hours.     Dispense 30 day quantity when ordering supplies.        Follow Up Instructions: At the Wound Care Center : 4 weeks  Primary Wound Care Provider: Nancy Delarosa CNP   Call  for any questions or concerns.  Central [phone] for imaging and lab work

## 2025-01-13 ENCOUNTER — HOSPITAL ENCOUNTER (OUTPATIENT)
Dept: WOUND CARE | Age: 35
Discharge: HOME | End: 2025-01-13
Attending: NURSE PRACTITIONER
Payer: COMMERCIAL

## 2025-01-13 VITALS
RESPIRATION RATE: 18 BRPM | SYSTOLIC BLOOD PRESSURE: 107 MMHG | DIASTOLIC BLOOD PRESSURE: 59 MMHG | HEART RATE: 85 BPM | TEMPERATURE: 97 F

## 2025-01-13 DIAGNOSIS — L89.313 PRESSURE INJURY OF RIGHT ISCHIUM, STAGE 3 (HCC): ICD-10-CM

## 2025-01-13 DIAGNOSIS — T14.8XXA SKIN EXCORIATION: ICD-10-CM

## 2025-01-13 DIAGNOSIS — G82.20 PARAPLEGIA (HCC): ICD-10-CM

## 2025-01-13 DIAGNOSIS — T14.8XXA EXCORIATION: ICD-10-CM

## 2025-01-13 DIAGNOSIS — N39.45 CONTINUOUS LEAKAGE OF URINE: Primary | ICD-10-CM

## 2025-01-13 PROCEDURE — 11042 DBRDMT SUBQ TIS 1ST 20SQCM/<: CPT

## 2025-01-13 PROCEDURE — 11042 DBRDMT SUBQ TIS 1ST 20SQCM/<: CPT | Performed by: NURSE PRACTITIONER

## 2025-01-13 RX ORDER — GENTAMICIN SULFATE 1 MG/G
OINTMENT TOPICAL ONCE
OUTPATIENT
Start: 2025-01-13 | End: 2025-01-13

## 2025-01-13 RX ORDER — LIDOCAINE HYDROCHLORIDE 20 MG/ML
JELLY TOPICAL ONCE
OUTPATIENT
Start: 2025-01-13 | End: 2025-01-13

## 2025-01-13 RX ORDER — NEOMYCIN/BACITRACIN/POLYMYXINB 3.5-400-5K
OINTMENT (GRAM) TOPICAL ONCE
OUTPATIENT
Start: 2025-01-13 | End: 2025-01-13

## 2025-01-13 RX ORDER — SODIUM CHLOR/HYPOCHLOROUS ACID 0.033 %
SOLUTION, IRRIGATION IRRIGATION ONCE
OUTPATIENT
Start: 2025-01-13 | End: 2025-01-13

## 2025-01-13 RX ORDER — LIDOCAINE 40 MG/G
CREAM TOPICAL ONCE
OUTPATIENT
Start: 2025-01-13 | End: 2025-01-13

## 2025-01-13 RX ORDER — TRIAMCINOLONE ACETONIDE 1 MG/G
OINTMENT TOPICAL ONCE
OUTPATIENT
Start: 2025-01-13 | End: 2025-01-13

## 2025-01-13 RX ORDER — CLOBETASOL PROPIONATE 0.5 MG/G
OINTMENT TOPICAL ONCE
OUTPATIENT
Start: 2025-01-13 | End: 2025-01-13

## 2025-01-13 RX ORDER — MUPIROCIN 20 MG/G
OINTMENT TOPICAL ONCE
OUTPATIENT
Start: 2025-01-13 | End: 2025-01-13

## 2025-01-13 RX ORDER — LIDOCAINE HYDROCHLORIDE 40 MG/ML
SOLUTION TOPICAL ONCE
OUTPATIENT
Start: 2025-01-13 | End: 2025-01-13

## 2025-01-13 RX ORDER — BETAMETHASONE DIPROPIONATE 0.5 MG/G
CREAM TOPICAL ONCE
OUTPATIENT
Start: 2025-01-13 | End: 2025-01-13

## 2025-01-13 RX ORDER — SILVER SULFADIAZINE 10 MG/G
CREAM TOPICAL ONCE
OUTPATIENT
Start: 2025-01-13 | End: 2025-01-13

## 2025-01-13 RX ORDER — LIDOCAINE 50 MG/G
OINTMENT TOPICAL ONCE
OUTPATIENT
Start: 2025-01-13 | End: 2025-01-13

## 2025-01-13 RX ORDER — GINSENG 100 MG
CAPSULE ORAL ONCE
OUTPATIENT
Start: 2025-01-13 | End: 2025-01-13

## 2025-01-13 RX ORDER — BACITRACIN ZINC AND POLYMYXIN B SULFATE 500; 1000 [USP'U]/G; [USP'U]/G
OINTMENT TOPICAL ONCE
OUTPATIENT
Start: 2025-01-13 | End: 2025-01-13

## 2025-01-13 NOTE — PATIENT INSTRUCTIONS
PHYSICIAN ORDERS AND DISCHARGE INSTRUCTIONS     Wound cleansing:               Do not scrub or use excessive force.              Wash hands with soap and water before and after dressing changes.              Prior to applying a clean dressing, cleanse wound with normal saline,               wound cleanser, or mild soap and water.               Ask the physician or nurse before getting the wound(s) wet in a shower                          Wound Care Notes:                               Orders for this week:  2025             Fax to [facility]          Right  Buttock cluster - Wash with soap and water, pat dry.   Apply Desitin clobetasol and Stimulen to wound.   Cover with Sacral Silicone [place] Border and Secure with Large Tegaderm.  Change Monday, Wednesday and Friday    Turn and reposition every 2 hours.     Dispense 30 day quantity when ordering supplies.        Follow Up Instructions: At the Wound Care Center : 4 weeks  Primary Wound Care Provider: Nancy Delarosa CNP   Call  for any questions or concerns.  Central [phone] for imaging and lab work

## 2025-01-13 NOTE — PROGRESS NOTES
obvious abnormality, atraumatic    Eyes:   PERRL, conjunctiva/corneas clear    Ears:   Normal external appearance, hearing grossly intact    Nose:  Nares normal, mucosa normal, no drainage    Throat:  Lips, mucosa, and tongue normal    Neck:  Supple, trachea midline    Respiratory:   Equal chest rise, respirations unlabored, no wheezing   Cardiovascular:   Regular rate and rhythm    Abdomen, GI:   Soft, non-tender, no rebound or guarding    Musculoskeletal:  Atraumatic, no cyanosis or edema   Neurologic:  Nonfocal, strength & sensation grossly intact   Psychiatric: Oriented x3, grossly appropriate judgement and insight      Dermatologic exam: Visual inspection of the periwound reveals the skin to be moist  Wound exam: see wound description below in procedure note      Assessment:       Kalen Levy  appears to have a non-healing wound/excoriation of the right ischium. The etiology of the wound is felt to be pressure and moisture . There are multiple complicating factors including chronic pressure, decreased mobility, shear force, and obesity.  A comprehensive wound management program would be helpful to heal this wound. Assessments completed include fall risk and nutritional, functional,and psychological status.  At this time appropriate care would include: periodic debridement and wound care as below.     Problem List Items Addressed This Visit          Other    WD-Paraplegia (HCC)    Relevant Orders    Initiate Outpatient Wound Care Protocol    WD-Excoriation    Relevant Orders    Initiate Outpatient Wound Care Protocol    Pressure injury of right ischium, stage 3 (HCC)    Relevant Orders    Initiate Outpatient Wound Care Protocol    Continuous leakage of urine - Primary    Relevant Orders    Initiate Outpatient Wound Care Protocol    Skin excoriation     Procedure Note    Indications:  Based on my examination of this patient's wound(s) today, sharp excision into subcutaneous tissue is required to promote

## 2025-02-10 ENCOUNTER — HOSPITAL ENCOUNTER (OUTPATIENT)
Dept: WOUND CARE | Age: 35
Discharge: HOME | End: 2025-02-10
Attending: NURSE PRACTITIONER
Payer: COMMERCIAL

## 2025-02-10 VITALS
TEMPERATURE: 97.2 F | SYSTOLIC BLOOD PRESSURE: 118 MMHG | HEART RATE: 87 BPM | DIASTOLIC BLOOD PRESSURE: 79 MMHG | RESPIRATION RATE: 18 BRPM

## 2025-02-10 DIAGNOSIS — T14.8XXA EXCORIATION: ICD-10-CM

## 2025-02-10 DIAGNOSIS — L89.313 PRESSURE INJURY OF RIGHT ISCHIUM, STAGE 3 (HCC): ICD-10-CM

## 2025-02-10 DIAGNOSIS — G82.20 PARAPLEGIA (HCC): ICD-10-CM

## 2025-02-10 DIAGNOSIS — N39.45 CONTINUOUS LEAKAGE OF URINE: Primary | ICD-10-CM

## 2025-02-10 PROCEDURE — 11042 DBRDMT SUBQ TIS 1ST 20SQCM/<: CPT | Performed by: NURSE PRACTITIONER

## 2025-02-10 PROCEDURE — 6370000000 HC RX 637 (ALT 250 FOR IP): Performed by: NURSE PRACTITIONER

## 2025-02-10 PROCEDURE — 11042 DBRDMT SUBQ TIS 1ST 20SQCM/<: CPT

## 2025-02-10 RX ORDER — SODIUM CHLOR/HYPOCHLOROUS ACID 0.033 %
SOLUTION, IRRIGATION IRRIGATION ONCE
OUTPATIENT
Start: 2025-02-10 | End: 2025-02-10

## 2025-02-10 RX ORDER — BACITRACIN ZINC AND POLYMYXIN B SULFATE 500; 1000 [USP'U]/G; [USP'U]/G
OINTMENT TOPICAL ONCE
OUTPATIENT
Start: 2025-02-10 | End: 2025-02-10

## 2025-02-10 RX ORDER — GENTAMICIN SULFATE 1 MG/G
OINTMENT TOPICAL ONCE
OUTPATIENT
Start: 2025-02-10 | End: 2025-02-10

## 2025-02-10 RX ORDER — NEOMYCIN/BACITRACIN/POLYMYXINB 3.5-400-5K
OINTMENT (GRAM) TOPICAL ONCE
OUTPATIENT
Start: 2025-02-10 | End: 2025-02-10

## 2025-02-10 RX ORDER — CLOBETASOL PROPIONATE 0.5 MG/G
OINTMENT TOPICAL ONCE
OUTPATIENT
Start: 2025-02-10 | End: 2025-02-10

## 2025-02-10 RX ORDER — GINSENG 100 MG
CAPSULE ORAL ONCE
OUTPATIENT
Start: 2025-02-10 | End: 2025-02-10

## 2025-02-10 RX ORDER — BETAMETHASONE DIPROPIONATE 0.5 MG/G
CREAM TOPICAL ONCE
OUTPATIENT
Start: 2025-02-10 | End: 2025-02-10

## 2025-02-10 RX ORDER — LIDOCAINE 40 MG/G
CREAM TOPICAL ONCE
OUTPATIENT
Start: 2025-02-10 | End: 2025-02-10

## 2025-02-10 RX ORDER — MUPIROCIN 20 MG/G
OINTMENT TOPICAL ONCE
OUTPATIENT
Start: 2025-02-10 | End: 2025-02-10

## 2025-02-10 RX ORDER — CLOBETASOL PROPIONATE 0.5 MG/G
OINTMENT TOPICAL ONCE
Status: COMPLETED | OUTPATIENT
Start: 2025-02-10 | End: 2025-02-10

## 2025-02-10 RX ORDER — TRIAMCINOLONE ACETONIDE 1 MG/G
OINTMENT TOPICAL ONCE
OUTPATIENT
Start: 2025-02-10 | End: 2025-02-10

## 2025-02-10 RX ORDER — LIDOCAINE 50 MG/G
OINTMENT TOPICAL ONCE
OUTPATIENT
Start: 2025-02-10 | End: 2025-02-10

## 2025-02-10 RX ORDER — LIDOCAINE HYDROCHLORIDE 20 MG/ML
JELLY TOPICAL ONCE
OUTPATIENT
Start: 2025-02-10 | End: 2025-02-10

## 2025-02-10 RX ORDER — LIDOCAINE HYDROCHLORIDE 40 MG/ML
SOLUTION TOPICAL ONCE
OUTPATIENT
Start: 2025-02-10 | End: 2025-02-10

## 2025-02-10 RX ORDER — SILVER SULFADIAZINE 10 MG/G
CREAM TOPICAL ONCE
OUTPATIENT
Start: 2025-02-10 | End: 2025-02-10

## 2025-02-10 RX ADMIN — CLOBETASOL PROPIONATE: 0.5 OINTMENT TOPICAL at 13:28

## 2025-02-10 NOTE — PATIENT INSTRUCTIONS
PHYSICIAN ORDERS AND DISCHARGE INSTRUCTIONS     Wound cleansing:               Do not scrub or use excessive force.              Wash hands with soap and water before and after dressing changes.              Prior to applying a clean dressing, cleanse wound with normal saline,               wound cleanser, or mild soap and water.               Ask the physician or nurse before getting the wound(s) wet in a shower                          Wound Care Notes:                               Orders for this week:  2/10/2025             Fax to [facility]          Right  Buttock cluster - Wash with soap and water, pat dry.   Apply Desitin clobetasol and Stimulen to wound.   Cover with Sacral Silicone [place] Border and Secure with Large Tegaderm.  Change Monday, Wednesday and Friday    Turn and reposition every 2 hours.     Dispense 30 day quantity when ordering supplies.        Follow Up Instructions: At the Wound Care Center : 4 weeks  Primary Wound Care Provider: Nancy Delarosa CNP   Call  for any questions or concerns.  Central [phone] for imaging and lab work   No

## 2025-02-10 NOTE — PROGRESS NOTES
evaluate the extent of previous healing.    Performed by: PRECIOUS Echols CNP    Consent obtained: Yes    Time out taken:  Yes    Pain Control: 2% Lidocaine topical spray    Debridement:Excisional Debridement    Using curette the wound(s) was/were sharply debrided down through and including the removal of subcutaneous tissue.        Devitalized Tissue Debrided:  fibrin, biofilm, slough, and exudate    Pre Debridement Measurements:  Are located in the Wound Documentation Flow Sheet    All active wounds listed below with today's date are evaluated  Wound(s)    debrided this date include # : 1     Post  Debridement Measurements:  Wound 02/11/19 WOUND #1 RIGHT POSTERIOR THIGH pressure 1 (Active)   Number of days: 2191       Wound 05/21/21 Buttocks Right Right Ischium  (Active)   Number of days: 1361       Wound 01/08/24 Right #1 right buttocks cluster (Active)   Wound Image   02/10/25 1254   Wound Etiology Pressure Stage 3 01/13/25 1252   Dressing Status New dressing applied;Clean;Dry 02/10/25 1325   Wound Cleansed Wound cleanser;Soap and water 02/10/25 1254   Dressing/Treatment Other (comment) 02/10/25 1325   Offloading for Diabetic Foot Ulcers Other (comment) 02/10/25 1325   Wound Length (cm) 2.5 cm 02/10/25 1254   Wound Width (cm) 3.5 cm 02/10/25 1254   Wound Depth (cm) 1.1 cm 02/10/25 1254   Wound Surface Area (cm^2) 8.75 cm^2 02/10/25 1254   Change in Wound Size % (l*w) -775 02/10/25 1254   Wound Volume (cm^3) 9.625 cm^3 02/10/25 1254   Wound Healing % -9525 02/10/25 1254   Post-Procedure Length (cm) 2.5 cm 02/10/25 1310   Post-Procedure Width (cm) 3.5 cm 02/10/25 1310   Post-Procedure Depth (cm) 1.1 cm 02/10/25 1310   Post-Procedure Surface Area (cm^2) 8.75 cm^2 02/10/25 1310   Post-Procedure Volume (cm^3) 9.625 cm^3 02/10/25 1310   Distance Tunneling (cm) 0 cm 02/10/25 1254   Tunneling Position ___ O'Clock 0 02/10/25 1254   Undermining Starts ___ O'Clock 0 02/10/25 1254   Undermining Ends___ O'Clock 0

## 2025-03-03 ENCOUNTER — HOSPITAL ENCOUNTER (OUTPATIENT)
Dept: WOUND CARE | Age: 35
Discharge: HOME | End: 2025-03-03
Attending: NURSE PRACTITIONER
Payer: COMMERCIAL

## 2025-03-03 VITALS
SYSTOLIC BLOOD PRESSURE: 138 MMHG | DIASTOLIC BLOOD PRESSURE: 65 MMHG | RESPIRATION RATE: 20 BRPM | TEMPERATURE: 96.7 F | HEART RATE: 92 BPM

## 2025-03-03 DIAGNOSIS — G82.20 PARAPLEGIA (HCC): ICD-10-CM

## 2025-03-03 DIAGNOSIS — T14.8XXA EXCORIATION: ICD-10-CM

## 2025-03-03 DIAGNOSIS — N39.45 CONTINUOUS LEAKAGE OF URINE: ICD-10-CM

## 2025-03-03 DIAGNOSIS — L89.313 PRESSURE INJURY OF RIGHT ISCHIUM, STAGE 3 (HCC): Primary | ICD-10-CM

## 2025-03-03 PROCEDURE — 11042 DBRDMT SUBQ TIS 1ST 20SQCM/<: CPT | Performed by: NURSE PRACTITIONER

## 2025-03-03 PROCEDURE — 11042 DBRDMT SUBQ TIS 1ST 20SQCM/<: CPT

## 2025-03-03 RX ORDER — LIDOCAINE HYDROCHLORIDE 40 MG/ML
SOLUTION TOPICAL ONCE
OUTPATIENT
Start: 2025-03-03 | End: 2025-03-03

## 2025-03-03 RX ORDER — NEOMYCIN/BACITRACIN/POLYMYXINB 3.5-400-5K
OINTMENT (GRAM) TOPICAL ONCE
OUTPATIENT
Start: 2025-03-03 | End: 2025-03-03

## 2025-03-03 RX ORDER — GENTAMICIN SULFATE 1 MG/G
OINTMENT TOPICAL ONCE
OUTPATIENT
Start: 2025-03-03 | End: 2025-03-03

## 2025-03-03 RX ORDER — MUPIROCIN 20 MG/G
OINTMENT TOPICAL ONCE
OUTPATIENT
Start: 2025-03-03 | End: 2025-03-03

## 2025-03-03 RX ORDER — GINSENG 100 MG
CAPSULE ORAL ONCE
OUTPATIENT
Start: 2025-03-03 | End: 2025-03-03

## 2025-03-03 RX ORDER — LIDOCAINE HYDROCHLORIDE 20 MG/ML
JELLY TOPICAL ONCE
OUTPATIENT
Start: 2025-03-03 | End: 2025-03-03

## 2025-03-03 RX ORDER — TRIAMCINOLONE ACETONIDE 1 MG/G
OINTMENT TOPICAL ONCE
OUTPATIENT
Start: 2025-03-03 | End: 2025-03-03

## 2025-03-03 RX ORDER — LIDOCAINE 40 MG/G
CREAM TOPICAL ONCE
OUTPATIENT
Start: 2025-03-03 | End: 2025-03-03

## 2025-03-03 RX ORDER — BETAMETHASONE DIPROPIONATE 0.5 MG/G
CREAM TOPICAL ONCE
OUTPATIENT
Start: 2025-03-03 | End: 2025-03-03

## 2025-03-03 RX ORDER — SILVER SULFADIAZINE 10 MG/G
CREAM TOPICAL ONCE
OUTPATIENT
Start: 2025-03-03 | End: 2025-03-03

## 2025-03-03 RX ORDER — CLOBETASOL PROPIONATE 0.5 MG/G
OINTMENT TOPICAL ONCE
OUTPATIENT
Start: 2025-03-03 | End: 2025-03-03

## 2025-03-03 RX ORDER — LIDOCAINE 50 MG/G
OINTMENT TOPICAL ONCE
OUTPATIENT
Start: 2025-03-03 | End: 2025-03-03

## 2025-03-03 RX ORDER — BACITRACIN ZINC AND POLYMYXIN B SULFATE 500; 1000 [USP'U]/G; [USP'U]/G
OINTMENT TOPICAL ONCE
OUTPATIENT
Start: 2025-03-03 | End: 2025-03-03

## 2025-03-03 RX ORDER — SODIUM CHLOR/HYPOCHLOROUS ACID 0.033 %
SOLUTION, IRRIGATION IRRIGATION ONCE
OUTPATIENT
Start: 2025-03-03 | End: 2025-03-03

## 2025-03-03 NOTE — PROGRESS NOTES
25 1414   Undermining Maxium Distance (cm) 0.5 25 1414   Wound Assessment Pink/red 25 1414   Drainage Amount Moderate (25-50%) 25 1414   Drainage Description Serosanguinous 25 1414   Odor None 25 1414   Dyan-wound Assessment Intact 25 1414   Margins Defined edges 25 141   Wound Thickness Description not for Pressure Injury Full thickness 25 1414   Number of days: 420     Total  Area  Debrided:  4 sq cm     Bleeding:  Minimal    Hemostasis Achieved:  by pressure    Procedural Pain:  0  / 10     Post Procedural Pain:  0 / 10     Response to treatment:  Well tolerated by patient.       Plan:     Discharge instructions:    Patient Instructions   PHYSICIAN ORDERS AND DISCHARGE INSTRUCTIONS     Wound cleansing:               Do not scrub or use excessive force.              Wash hands with soap and water before and after dressing changes.              Prior to applying a clean dressing, cleanse wound with normal saline,               wound cleanser, or mild soap and water.               Ask the physician or nurse before getting the wound(s) wet in a shower                          Wound Care Notes:                               Orders for this week:  3/3/2025             Fax to [facility]          Right  Buttock cluster - Wash with soap and water, pat dry.   Apply Puracol ag to depth of wound   Cover with Sacral Silicone [place] Border  Change Monday, Wednesday and Friday    Turn and reposition every 2 hours.     Dispense 30 day quantity when ordering supplies.        Follow Up Instructions: At the Wound Care Center : 4 weeks  Primary Wound Care Provider: Nancy Delarosa CNP   Call  for any questions or concerns.  Central [phone] for imaging and lab work      Treatment Note Wound 24 Right #1 right buttocks cluster-Dressing/Treatment:  (puracol sacral border)    Written Patient Dismissal Instructions Given

## 2025-03-03 NOTE — PATIENT INSTRUCTIONS
PHYSICIAN ORDERS AND DISCHARGE INSTRUCTIONS     Wound cleansing:               Do not scrub or use excessive force.              Wash hands with soap and water before and after dressing changes.              Prior to applying a clean dressing, cleanse wound with normal saline,               wound cleanser, or mild soap and water.               Ask the physician or nurse before getting the wound(s) wet in a shower                          Wound Care Notes:                               Orders for this week:  3/3/2025             Fax to [facility]          Right  Buttock cluster - Wash with soap and water, pat dry.   Apply Puracol ag to depth of wound   Cover with Sacral Silicone [place] Border  Change Monday, Wednesday and Friday    Turn and reposition every 2 hours.     Dispense 30 day quantity when ordering supplies.        Follow Up Instructions: At the Wound Care Center : 4 weeks  Primary Wound Care Provider: Nancy Delarosa CNP   Call  for any questions or concerns.  Central [phone] for imaging and lab work

## 2025-03-10 ENCOUNTER — HOSPITAL ENCOUNTER (OUTPATIENT)
Dept: WOUND CARE | Age: 35
Discharge: HOME | End: 2025-03-10
Attending: NURSE PRACTITIONER
Payer: COMMERCIAL

## 2025-03-10 VITALS — RESPIRATION RATE: 16 BRPM | SYSTOLIC BLOOD PRESSURE: 115 MMHG | DIASTOLIC BLOOD PRESSURE: 38 MMHG | HEART RATE: 80 BPM

## 2025-03-10 DIAGNOSIS — N39.45 CONTINUOUS LEAKAGE OF URINE: ICD-10-CM

## 2025-03-10 DIAGNOSIS — T14.8XXA SKIN EXCORIATION: ICD-10-CM

## 2025-03-10 DIAGNOSIS — L89.212 PRESSURE INJURY OF RIGHT THIGH, STAGE 2 (HCC): ICD-10-CM

## 2025-03-10 DIAGNOSIS — L89.312 PRESSURE INJURY OF RIGHT BUTTOCK, STAGE 2 (HCC): ICD-10-CM

## 2025-03-10 DIAGNOSIS — L89.313 PRESSURE INJURY OF RIGHT ISCHIUM, STAGE 3 (HCC): Primary | ICD-10-CM

## 2025-03-10 PROCEDURE — 11045 DBRDMT SUBQ TISS EACH ADDL: CPT | Performed by: NURSE PRACTITIONER

## 2025-03-10 PROCEDURE — 11045 DBRDMT SUBQ TISS EACH ADDL: CPT

## 2025-03-10 PROCEDURE — 99213 OFFICE O/P EST LOW 20 MIN: CPT | Performed by: NURSE PRACTITIONER

## 2025-03-10 PROCEDURE — 11042 DBRDMT SUBQ TIS 1ST 20SQCM/<: CPT

## 2025-03-10 PROCEDURE — 11042 DBRDMT SUBQ TIS 1ST 20SQCM/<: CPT | Performed by: NURSE PRACTITIONER

## 2025-03-10 NOTE — PATIENT INSTRUCTIONS
PHYSICIAN ORDERS AND DISCHARGE INSTRUCTIONS     Wound cleansing:               Do not scrub or use excessive force.              Wash hands with soap and water before and after dressing changes.              Prior to applying a clean dressing, cleanse wound with normal saline,               wound cleanser, or mild soap and water.               Ask the physician or nurse before getting the wound(s) wet in a shower                          Wound Care Notes:                               Orders for this week:  3/10/2025             Fax to [facility]          Right  Ischium - Wash with soap and water, pat dry.   Layer Puracol ag to depth of wound  Qwick to periwound secured with medipore tape  Cover with Sacral Silicone [place] Border  Change Monday, Wednesday and Friday    Right Buttock/ Right Medial Thigh   Apply puracol ag to wound bed   Cover with gentac   Change Monday. Wednesday and Friday     Turn and reposition every 2 hours.     Dispense 30 day quantity when ordering supplies.        Follow Up Instructions: At the Wound Care Center : 2 weeks  Primary Wound Care Provider: Nancy Delarosa CNP   Call  for any questions or concerns.  Central [phone] for imaging and lab work

## 2025-03-11 PROBLEM — L89.312 PRESSURE INJURY OF RIGHT BUTTOCK, STAGE 2 (HCC): Status: ACTIVE | Noted: 2025-03-11

## 2025-03-11 PROBLEM — L89.212 PRESSURE INJURY OF RIGHT THIGH, STAGE 2 (HCC): Status: ACTIVE | Noted: 2025-03-11

## 2025-03-11 NOTE — PROGRESS NOTES
Wound Care Center Progress Visit      Kalen Levy  AGE: 34 y.o.   GENDER: female  : 1990  EPISODE DATE:  3/10/2025   Referred by: Kenya Valera CNP    Subjective:     CHIEF COMPLAINT  WOUND   Problem List Items Addressed This Visit          Musculoskeletal and Integument    Skin excoriation       Other    Pressure injury of right ischium, stage 3 (HCC) - Primary    Continuous leakage of urine    Pressure injury of right buttock, stage 2 (HCC)    Pressure injury of right thigh, stage 2 (HCC)     Chief Complaint   Patient presents with    Wound Check        HISTORY of PRESENT ILLNESS      Kalen Levy is a 34 y.o. 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 The condition is of moderate severity. The area is recurrent in nature and they have working on it on and off for several months, she was last at the wound clinic 24.  The underlying cause is thought to be pressure and moisture. The patients care to date has included foam dressings. The patient has significant underlying medical conditions as below.     Wound Pain Timing/Severity: intermittent, mild  Quality of pain: tender  Severity of pain:  1 / 10   Modifying Factors: chronic pressure, decreased mobility, shear force, and obesity  Associated Signs/Symptoms: drainage and pain      Wound status:  3/10/2025    SIGNIFICANT, SEPARATELY IDENTIFIABLE EVALUATION AND MANAGEMENT SERVICE BY THE SAME PROVIDER ON THE SAME DAY OF THE PROCEDURE OR OTHER SERVICE:    Diagnosis & Medically appropriate history/examination & Medical Decision Making:   New stage 2 pressure wounds to right buttock and right medial thigh of moderate severity with treatment regimen as below.    Reinforced turning and repositioning to minimize pressure to right ischium/hip wound. [facility] assists with dressing changes 3 times weekly. Added Qwick to right ischium ginger wound for additional

## 2025-03-12 NOTE — PLAN OF CARE
Demographics info for supply order 3/12/25.  Electronically signed by PRECIOUS Reza CNP on 3/12/2025 at 1:33 PM      Patient Info:    Kalen Wing  [address]  [id number]  [place] [facility] [address]  [phone]  : 1990  Age: 34 y.o.  Gender: female  Todays Date: 3/12/2025    Insurance Info:    Plan: [employer]  Coverage: [employer] PLAN  Effective Date: 2020  Group Number: [unfilled]  Subscriber Number: [id number] - (Medicaid Managed)    Payer/Plan Subscr  Sex Relation Sub. Ins. ID Effective Group Num   1. [place] COMM[employer]* LE WING* 1990 Female Self [id number]                                    [address]

## 2025-03-24 ENCOUNTER — HOSPITAL ENCOUNTER (OUTPATIENT)
Dept: WOUND CARE | Age: 35
Discharge: HOME | End: 2025-03-24
Attending: NURSE PRACTITIONER
Payer: COMMERCIAL

## 2025-03-24 VITALS
SYSTOLIC BLOOD PRESSURE: 120 MMHG | DIASTOLIC BLOOD PRESSURE: 86 MMHG | RESPIRATION RATE: 16 BRPM | HEART RATE: 88 BPM | TEMPERATURE: 97.6 F

## 2025-03-24 DIAGNOSIS — G82.20 PARAPLEGIA: ICD-10-CM

## 2025-03-24 DIAGNOSIS — T14.8XXA SKIN EXCORIATION: ICD-10-CM

## 2025-03-24 DIAGNOSIS — N39.45 CONTINUOUS LEAKAGE OF URINE: Primary | ICD-10-CM

## 2025-03-24 DIAGNOSIS — L89.212 PRESSURE INJURY OF RIGHT THIGH, STAGE 2 (HCC): ICD-10-CM

## 2025-03-24 DIAGNOSIS — L89.313 PRESSURE INJURY OF RIGHT ISCHIUM, STAGE 3 (HCC): ICD-10-CM

## 2025-03-24 DIAGNOSIS — L89.312 PRESSURE INJURY OF RIGHT BUTTOCK, STAGE 2 (HCC): ICD-10-CM

## 2025-03-24 DIAGNOSIS — T14.8XXA EXCORIATION: ICD-10-CM

## 2025-03-24 PROCEDURE — 11042 DBRDMT SUBQ TIS 1ST 20SQCM/<: CPT | Performed by: NURSE PRACTITIONER

## 2025-03-24 PROCEDURE — 11042 DBRDMT SUBQ TIS 1ST 20SQCM/<: CPT

## 2025-03-24 RX ORDER — GINSENG 100 MG
CAPSULE ORAL ONCE
OUTPATIENT
Start: 2025-03-24 | End: 2025-03-24

## 2025-03-24 RX ORDER — NEOMYCIN/BACITRACIN/POLYMYXINB 3.5-400-5K
OINTMENT (GRAM) TOPICAL ONCE
OUTPATIENT
Start: 2025-03-24 | End: 2025-03-24

## 2025-03-24 RX ORDER — MUPIROCIN 20 MG/G
OINTMENT TOPICAL ONCE
OUTPATIENT
Start: 2025-03-24 | End: 2025-03-24

## 2025-03-24 RX ORDER — BACITRACIN ZINC AND POLYMYXIN B SULFATE 500; 1000 [USP'U]/G; [USP'U]/G
OINTMENT TOPICAL ONCE
OUTPATIENT
Start: 2025-03-24 | End: 2025-03-24

## 2025-03-24 RX ORDER — GENTAMICIN SULFATE 1 MG/G
OINTMENT TOPICAL ONCE
OUTPATIENT
Start: 2025-03-24 | End: 2025-03-24

## 2025-03-24 RX ORDER — LIDOCAINE 50 MG/G
OINTMENT TOPICAL ONCE
OUTPATIENT
Start: 2025-03-24 | End: 2025-03-24

## 2025-03-24 RX ORDER — LIDOCAINE HYDROCHLORIDE 40 MG/ML
SOLUTION TOPICAL ONCE
OUTPATIENT
Start: 2025-03-24 | End: 2025-03-24

## 2025-03-24 RX ORDER — TRIAMCINOLONE ACETONIDE 1 MG/G
OINTMENT TOPICAL ONCE
OUTPATIENT
Start: 2025-03-24 | End: 2025-03-24

## 2025-03-24 RX ORDER — SODIUM CHLOR/HYPOCHLOROUS ACID 0.033 %
SOLUTION, IRRIGATION IRRIGATION ONCE
OUTPATIENT
Start: 2025-03-24 | End: 2025-03-24

## 2025-03-24 RX ORDER — LIDOCAINE HYDROCHLORIDE 20 MG/ML
JELLY TOPICAL ONCE
OUTPATIENT
Start: 2025-03-24 | End: 2025-03-24

## 2025-03-24 RX ORDER — SILVER SULFADIAZINE 10 MG/G
CREAM TOPICAL ONCE
OUTPATIENT
Start: 2025-03-24 | End: 2025-03-24

## 2025-03-24 RX ORDER — LIDOCAINE 40 MG/G
CREAM TOPICAL ONCE
OUTPATIENT
Start: 2025-03-24 | End: 2025-03-24

## 2025-03-24 RX ORDER — BETAMETHASONE DIPROPIONATE 0.5 MG/G
CREAM TOPICAL ONCE
OUTPATIENT
Start: 2025-03-24 | End: 2025-03-24

## 2025-03-24 RX ORDER — CLOBETASOL PROPIONATE 0.5 MG/G
OINTMENT TOPICAL ONCE
OUTPATIENT
Start: 2025-03-24 | End: 2025-03-24

## 2025-03-24 NOTE — PROGRESS NOTES
Stage 3 01/13/25 1252   Dressing Status New dressing applied 03/10/25 1403   Wound Cleansed Wound cleanser 03/24/25 1314   Dressing/Treatment Other (comment) 03/24/25 1333   Offloading for Diabetic Foot Ulcers Other (comment) 03/10/25 1403   Wound Length (cm) 2 cm 03/24/25 1314   Wound Width (cm) 2.1 cm 03/24/25 1314   Wound Depth (cm) 0.3 cm 03/24/25 1314   Wound Surface Area (cm^2) 4.2 cm^2 03/24/25 1314   Change in Wound Size % (l*w) -320 03/24/25 1314   Wound Volume (cm^3) 1.26 cm^3 03/24/25 1314   Wound Healing % -1160 03/24/25 1314   Post-Procedure Length (cm) 2 cm 03/24/25 1331   Post-Procedure Width (cm) 2.1 cm 03/24/25 1331   Post-Procedure Depth (cm) 0.3 cm 03/24/25 1331   Post-Procedure Surface Area (cm^2) 4.2 cm^2 03/24/25 1331   Post-Procedure Volume (cm^3) 1.26 cm^3 03/24/25 1331   Distance Tunneling (cm) 0 cm 03/24/25 1314   Tunneling Position ___ O'Clock 0 03/24/25 1314   Undermining Starts ___ O'Clock 0 03/24/25 1314   Undermining Ends___ O'Clock 0 03/24/25 1314   Undermining Maxium Distance (cm) 0 03/24/25 1314   Wound Assessment Pink/red;Slough 03/24/25 1314   Drainage Amount Moderate (25-50%) 03/24/25 1314   Drainage Description Serosanguinous 03/24/25 1314   Odor None 03/24/25 1314   Dyan-wound Assessment Intact 03/24/25 1314   Margins Defined edges 03/24/25 1314   Wound Thickness Description not for Pressure Injury Full thickness 03/24/25 1314   Number of days: 441     Total  Area  Debrided:  4.2 sq cm     Bleeding:  Minimal    Hemostasis Achieved:  by pressure    Procedural Pain:  0  / 10     Post Procedural Pain:  0 / 10     Response to treatment:  Well tolerated by patient.       Plan:     Discharge instructions:    Patient Instructions   PHYSICIAN ORDERS AND DISCHARGE INSTRUCTIONS     Wound cleansing:               Do not scrub or use excessive force.              Wash hands with soap and water before and after dressing changes.              Prior to applying a clean dressing, cleanse wound

## 2025-03-24 NOTE — PATIENT INSTRUCTIONS
PHYSICIAN ORDERS AND DISCHARGE INSTRUCTIONS     Wound cleansing:               Do not scrub or use excessive force.              Wash hands with soap and water before and after dressing changes.              Prior to applying a clean dressing, cleanse wound with normal saline,               wound cleanser, or mild soap and water.               Ask the physician or nurse before getting the wound(s) wet in a shower                          Wound Care Notes:                               Orders for this week:  3/24/2025             Fax to [facility]          Right  Ischium - Wash with soap and water, pat dry.   Layer Puracol ag to depth of wound  Apply lightly dampened hydrafera blue to wound   Cover with calcium alginate  Apply mastisol to periwound  Cover with Sacral Silicone [place] Border and tegaderm   Change Monday, Wednesday and Friday    Turn and reposition every 2 hours.     Dispense 30 day quantity when ordering supplies.  Supplies from [employer] delivered 3/17/25        Follow Up Instructions: At the Wound Care Center : 2 weeks  Primary Wound Care Provider: Nancy Delarosa CNP   Call  for any questions or concerns.  Central [phone] for imaging and lab work

## 2025-04-14 ENCOUNTER — HOSPITAL ENCOUNTER (OUTPATIENT)
Dept: WOUND CARE | Age: 35
Discharge: HOME | End: 2025-04-14
Attending: NURSE PRACTITIONER
Payer: COMMERCIAL

## 2025-04-14 DIAGNOSIS — L89.313 PRESSURE INJURY OF RIGHT ISCHIUM, STAGE 3 (HCC): ICD-10-CM

## 2025-04-14 DIAGNOSIS — G82.20 PARAPLEGIA: ICD-10-CM

## 2025-04-14 DIAGNOSIS — N39.45 CONTINUOUS LEAKAGE OF URINE: Primary | ICD-10-CM

## 2025-04-14 DIAGNOSIS — T14.8XXA EXCORIATION: ICD-10-CM

## 2025-04-14 PROCEDURE — 11042 DBRDMT SUBQ TIS 1ST 20SQCM/<: CPT

## 2025-04-14 PROCEDURE — 11042 DBRDMT SUBQ TIS 1ST 20SQCM/<: CPT | Performed by: NURSE PRACTITIONER

## 2025-04-14 RX ORDER — LIDOCAINE HYDROCHLORIDE 20 MG/ML
JELLY TOPICAL ONCE
OUTPATIENT
Start: 2025-04-14 | End: 2025-04-14

## 2025-04-14 RX ORDER — GENTAMICIN SULFATE 1 MG/G
OINTMENT TOPICAL ONCE
OUTPATIENT
Start: 2025-04-14 | End: 2025-04-14

## 2025-04-14 RX ORDER — LIDOCAINE 50 MG/G
OINTMENT TOPICAL ONCE
OUTPATIENT
Start: 2025-04-14 | End: 2025-04-14

## 2025-04-14 RX ORDER — GINSENG 100 MG
CAPSULE ORAL ONCE
OUTPATIENT
Start: 2025-04-14 | End: 2025-04-14

## 2025-04-14 RX ORDER — BACITRACIN ZINC AND POLYMYXIN B SULFATE 500; 1000 [USP'U]/G; [USP'U]/G
OINTMENT TOPICAL ONCE
OUTPATIENT
Start: 2025-04-14 | End: 2025-04-14

## 2025-04-14 RX ORDER — TRIAMCINOLONE ACETONIDE 1 MG/G
OINTMENT TOPICAL ONCE
OUTPATIENT
Start: 2025-04-14 | End: 2025-04-14

## 2025-04-14 RX ORDER — NEOMYCIN/BACITRACIN/POLYMYXINB 3.5-400-5K
OINTMENT (GRAM) TOPICAL ONCE
OUTPATIENT
Start: 2025-04-14 | End: 2025-04-14

## 2025-04-14 RX ORDER — CLOBETASOL PROPIONATE 0.5 MG/G
OINTMENT TOPICAL ONCE
OUTPATIENT
Start: 2025-04-14 | End: 2025-04-14

## 2025-04-14 RX ORDER — LIDOCAINE HYDROCHLORIDE 40 MG/ML
SOLUTION TOPICAL ONCE
OUTPATIENT
Start: 2025-04-14 | End: 2025-04-14

## 2025-04-14 RX ORDER — MUPIROCIN 20 MG/G
OINTMENT TOPICAL ONCE
OUTPATIENT
Start: 2025-04-14 | End: 2025-04-14

## 2025-04-14 RX ORDER — SODIUM CHLOR/HYPOCHLOROUS ACID 0.033 %
SOLUTION, IRRIGATION IRRIGATION ONCE
OUTPATIENT
Start: 2025-04-14 | End: 2025-04-14

## 2025-04-14 RX ORDER — SILVER SULFADIAZINE 10 MG/G
CREAM TOPICAL ONCE
OUTPATIENT
Start: 2025-04-14 | End: 2025-04-14

## 2025-04-14 RX ORDER — BETAMETHASONE DIPROPIONATE 0.5 MG/G
CREAM TOPICAL ONCE
OUTPATIENT
Start: 2025-04-14 | End: 2025-04-14

## 2025-04-14 RX ORDER — LIDOCAINE 40 MG/G
CREAM TOPICAL ONCE
OUTPATIENT
Start: 2025-04-14 | End: 2025-04-14

## 2025-04-14 NOTE — PROGRESS NOTES
Wound Care Center Progress Visit      Kalen Levy  AGE: 34 y.o.   GENDER: female  : 1990  EPISODE DATE:  2025   Referred by: Kenay Valera CNP    Subjective:     CHIEF COMPLAINT  WOUND   Problem List Items Addressed This Visit          Nervous and Auditory    WD-Paraplegia (HCC)    Relevant Orders    Initiate Outpatient Wound Care Protocol       Musculoskeletal and Integument    WD-Excoriation    Relevant Orders    Initiate Outpatient Wound Care Protocol       Other    Pressure injury of right ischium, stage 3 (HCC)    Relevant Orders    Initiate Outpatient Wound Care Protocol    Continuous leakage of urine - Primary    Relevant Orders    Initiate Outpatient Wound Care Protocol     Chief Complaint   Patient presents with    Wound Check        HISTORY of PRESENT ILLNESS      Kalen Levy is a 34 y.o. 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 The condition is of moderate severity. The area is recurrent in nature and they have working on it on and off for several months, she was last at the wound clinic 24.  The underlying cause is thought to be pressure and moisture. The patients care to date has included foam dressings. The patient has significant underlying medical conditions as below.     Wound Pain Timing/Severity: intermittent, mild  Quality of pain: tender  Severity of pain:  1 / 10   Modifying Factors: chronic pressure, decreased mobility, shear force, and obesity  Associated Signs/Symptoms: drainage and pain      Wound status:  2025      Right thigh wound continues to be a concern but much improved this visit, keeping dressing on is difficult with urinary incontinence, would benefit from a Purewick, RX provided.    Reinforced turning and repositioning to minimize pressure to right ischium/hip wound. [facility] assists with dressing changes 3 times weekly. Added Qwick to right ischium

## 2025-04-14 NOTE — PATIENT INSTRUCTIONS
PHYSICIAN ORDERS AND DISCHARGE INSTRUCTIONS     Wound cleansing:               Do not scrub or use excessive force.              Wash hands with soap and water before and after dressing changes.              Prior to applying a clean dressing, cleanse wound with normal saline,               wound cleanser, or mild soap and water.               Ask the physician or nurse before getting the wound(s) wet in a shower                          Wound Care Notes:                               Orders for this week:  2025             Fax to [employer]          Right  Ischium - Wash with soap and water, pat dry.   Layer Puracol ag to depth of wound  Apply lightly dampened hydrafera blue to wound   Cover with calcium alginate  Apply mastisol to periwound  Cover with Sacral Silicone [place] Border and tegaderm   Change Monday, Wednesday and Friday    Turn and reposition every 2 hours.     Dispense  day quantity when ordering supplies.  Supplies from [employer] delivered 3/17/25        Follow Up Instructions: At the Wound Care Center : 3 weeks  Primary Wound Care Provider: Nancy Delarosa CNP   Call  for any questions or concerns.  Central [phone] for imaging and lab work  
18

## 2025-04-14 NOTE — PLAN OF CARE
Wound Care Supplies      Supply Company:     [employer]  [address], [place] [address] p: [phone] f: [phone]     Ordering Center:     [facility]  [address]  [facility] [address] [phone]  WOUND CARE Dept: [phone]   FAX NUMBER [phone]    Patient Information:      Kalen Wing  [address]  [address]  [facility] [address]   [phone]   : 1990  AGE: 34 y.o.     GENDER: female   EPISODE DATE: 2025    Insurance:      PRIMARY INSURANCE:  Plan: [employer]  Coverage: [employer]  Effective Date: 2020  Group Number: [unfilled]  Subscriber Number: [id number] - (Medicaid Managed)    Payer/Plan Subscr  Sex Relation Sub. Ins. ID Effective Group Num   1. MARK COMMU* LE WING* 1990 Female Self [id number] 20                                    [address]       Patient Wound Information:      Problem List Items Addressed This Visit          Nervous and Auditory    WD-Paraplegia (HCC)    Relevant Orders    Initiate Outpatient Wound Care Protocol       Musculoskeletal and Integument    WD-Excoriation    Relevant Orders    Initiate Outpatient Wound Care Protocol       Other    Pressure injury of right ischium, stage 3 (HCC)    Relevant Orders    Initiate Outpatient Wound Care Protocol    Continuous leakage of urine - Primary    Relevant Orders    Initiate Outpatient Wound Care Protocol       WOUNDS REQUIRING DRESSING SUPPLIES:     Wound 19 WOUND #1 RIGHT POSTERIOR THIGH pressure 1 (Active)   Number of days: 2254       Wound 24 #1 right Ischium (Active)   Wound Image   25 1314   Wound Etiology Pressure Stage 3 25 1253   Dressing Status New dressing applied 03/10/25 1403   Wound Cleansed Wound cleanser 25 1253   Dressing/Treatment Other (comment) 25 1253   Offloading for Diabetic Foot Ulcers Other (comment) 03/10/25 1403   Wound Length (cm)

## 2025-05-12 ENCOUNTER — HOSPITAL ENCOUNTER (OUTPATIENT)
Dept: WOUND CARE | Age: 35
Discharge: HOME | End: 2025-05-12
Attending: NURSE PRACTITIONER
Payer: COMMERCIAL

## 2025-05-12 VITALS
TEMPERATURE: 97.6 F | HEART RATE: 105 BPM | SYSTOLIC BLOOD PRESSURE: 131 MMHG | DIASTOLIC BLOOD PRESSURE: 75 MMHG | RESPIRATION RATE: 16 BRPM

## 2025-05-12 DIAGNOSIS — L89.313 PRESSURE INJURY OF RIGHT ISCHIUM, STAGE 3 (HCC): Primary | ICD-10-CM

## 2025-05-12 PROCEDURE — 11042 DBRDMT SUBQ TIS 1ST 20SQCM/<: CPT

## 2025-05-12 PROCEDURE — 11042 DBRDMT SUBQ TIS 1ST 20SQCM/<: CPT | Performed by: NURSE PRACTITIONER

## 2025-05-12 ASSESSMENT — PAIN SCALES - GENERAL: PAINLEVEL_OUTOF10: 0

## 2025-05-12 NOTE — PROGRESS NOTES
Wound Care Center Progress Visit      Kalen Levy  AGE: 34 y.o.   GENDER: female  : 1990  EPISODE DATE:  2025   Referred by: Kenya Valera CNP    Subjective:     CHIEF COMPLAINT  WOUND   Problem List Items Addressed This Visit          Other    Pressure injury of right ischium, stage 3 (HCC) - Primary       Chief Complaint   Patient presents with    Wound Check        HISTORY of PRESENT ILLNESS      Kalen Levy is a 34 y.o. 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 The condition is of moderate severity. The area is recurrent in nature and they have working on it on and off for several months, she was last at the wound clinic 24.  The underlying cause is thought to be pressure and moisture. The patients care to date has included foam dressings. The patient has significant underlying medical conditions as below.     Wound Pain Timing/Severity: intermittent, mild  Quality of pain: tender  Severity of pain:  1 / 10   Modifying Factors: chronic pressure, decreased mobility, shear force, and obesity  Associated Signs/Symptoms: drainage and pain      Wound status:  2025      Right thigh wound is much improved this visit, site is almost closed over.     Reinforced turning and repositioning to minimize pressure to right ischium/hip wound. [facility] assists with dressing changes 3 times weekly. Wound is almost closed over, switching to Desitin and cover dressing for protection of skin. Client will follow up in 4 weeks. Client will call sooner if site worsens. Regimen discussed and established with patient/family as below. The patients records were reviewed and discussed.  Time was given for questions. All questions were answered to the patients satisfaction.      [] Stable [x] Improved [] Worse [] Initial visit []  Revisit []  Healed    [x] Adjustments made to regimen of care  [x] Off loading

## 2025-05-12 NOTE — PLAN OF CARE
Problem: Wound:  Goal: Will show signs of wound healing; wound closure and no evidence of infection  Description: Will show signs of wound healing; wound closure and no evidence of infection  Outcome: Progressing     
cm^3 05/12/25 1254   Distance Tunneling (cm) 0 cm 05/12/25 1225   Tunneling Position ___ O'Clock 0 05/12/25 1225   Undermining Starts ___ O'Clock 0 05/12/25 1225   Undermining Ends___ O'Clock 0 05/12/25 1225   Undermining Maxium Distance (cm) 0 05/12/25 1225   Wound Assessment Pink/red 05/12/25 1225   Drainage Amount Small (< 25%) 05/12/25 1225   Drainage Description Serosanguinous;Yellow 05/12/25 1225   Odor None 05/12/25 1225   Ginger-wound Assessment Fragile 05/12/25 1225   Margins Undefined edges 05/12/25 1225   Wound Thickness Description not for Pressure Injury Full thickness 05/12/25 1225   Number of days: 490          Supplies Requested :      Right  Ischium - Wash with soap and water, pat dry.   Apply Desitin to wound bed and ginger wound  Cover with Sacral Silicone [place] Border and tegaderm   Change Monday, Wednesday and Friday    ADDITIONAL ITEMS:  [] Gloves Small  [] Gloves Medium [] Gloves Large [] Gloves XLarge  [] Tape 1\" [] Tape 2\" [] Tape 3\"  [] Medipore Tape  [] Saline  [] Vashe  [] Skin Prep   [] Adhesive Remover   [] Cotton Tip Applicators   [] Other:    Patient Wound(s) Debrided: [x] Yes if yes please add date 5/12/25   [] No    Debribement Type: Excisional/Sharp    Is the patient currently on an antibiotic for their Wound(s): [] Yes if yes please add name and dose    [x] No    Patient currently being seen by Home Health: [x] Yes   [] No    Duration for needed supplies:  []15  [x]30  []60  []90 Days    Electronically signed by Demetrius Nash RN on 5/12/2025 at 12:57 PM     Provider Information:      PROVIDER'S NAME: Nancy Delarosa CNP    NPI: [id number]

## 2025-05-12 NOTE — PATIENT INSTRUCTIONS
PHYSICIAN ORDERS AND DISCHARGE INSTRUCTIONS     Wound cleansing:               Do not scrub or use excessive force.              Wash hands with soap and water before and after dressing changes.              Prior to applying a clean dressing, cleanse wound with normal saline,               wound cleanser, or mild soap and water.               Ask the physician or nurse before getting the wound(s) wet in a shower                          Wound Care Notes:                               Orders for this week:  2025             Fax to [employer]          Right  Ischium - Wash with soap and water, pat dry.   Apply Desitin to wound bed and ginger wound  Cover with Sacral Silicone [place] Border and tegaderm   Change Monday, Wednesday and Friday    Turn and reposition every 2 hours.     Dispense 30 day quantity when ordering supplies.  Supplies ordered 25        Follow Up Instructions: At the Wound Care Center : 3 weeks  Primary Wound Care Provider: Nancy Delarosa CNP or Lynnette CNP  Call  for any questions or concerns.  Central [phone] for imaging and lab work

## 2025-06-02 ENCOUNTER — HOSPITAL ENCOUNTER (OUTPATIENT)
Dept: WOUND CARE | Age: 35
Discharge: HOME | End: 2025-06-02
Attending: NURSE PRACTITIONER
Payer: COMMERCIAL

## 2025-06-02 VITALS
RESPIRATION RATE: 16 BRPM | DIASTOLIC BLOOD PRESSURE: 92 MMHG | TEMPERATURE: 97.2 F | SYSTOLIC BLOOD PRESSURE: 117 MMHG | HEART RATE: 87 BPM

## 2025-06-02 DIAGNOSIS — L89.212 PRESSURE INJURY OF RIGHT THIGH, STAGE 2 (HCC): Primary | ICD-10-CM

## 2025-06-02 DIAGNOSIS — T14.8XXA SKIN EXCORIATION: ICD-10-CM

## 2025-06-02 DIAGNOSIS — L89.313 PRESSURE INJURY OF RIGHT ISCHIUM, STAGE 3 (HCC): ICD-10-CM

## 2025-06-02 PROCEDURE — 11042 DBRDMT SUBQ TIS 1ST 20SQCM/<: CPT

## 2025-06-02 PROCEDURE — 97597 DBRDMT OPN WND 1ST 20 CM/<: CPT | Performed by: NURSE PRACTITIONER

## 2025-06-02 PROCEDURE — 97597 DBRDMT OPN WND 1ST 20 CM/<: CPT

## 2025-06-02 ASSESSMENT — PAIN SCALES - GENERAL: PAINLEVEL_OUTOF10: 0

## 2025-06-02 NOTE — PROGRESS NOTES
Wound Care Center Progress Visit      Kalen Levy  AGE: 34 y.o.   GENDER: female  : 1990  EPISODE DATE:  2025   Referred by: Kenya Valera CNP    Subjective:     CHIEF COMPLAINT  WOUND   Problem List Items Addressed This Visit          Musculoskeletal and Integument    Skin excoriation       Other    Pressure injury of right ischium, stage 3 (HCC)    Pressure injury of right thigh, stage 2 (HCC) - Primary       Chief Complaint   Patient presents with    Wound Check        HISTORY of PRESENT ILLNESS      Kalen Levy is a 34 y.o. 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 The condition is of moderate severity. The area is recurrent in nature and they have working on it on and off for several months, she was last at the wound clinic 24.  The underlying cause is thought to be pressure and moisture. The patients care to date has included foam dressings. The patient has significant underlying medical conditions as below.     Wound Pain Timing/Severity: intermittent, mild  Quality of pain: tender  Severity of pain:  1 / 10   Modifying Factors: chronic pressure, decreased mobility, shear force, and obesity  Associated Signs/Symptoms: drainage and pain      Wound status:  2025      Right thigh wound is superficially closed. Patient would like to call the wound care center if needed. Explained they would need continued orders for home care to continue to come out. Home care is to drop of 1-2x weekly now. Patient and aid state they would like to be d/c'd and call if needed.      Regimen discussed and established with patient/family as below. The patients records were reviewed and discussed.  Time was given for questions. All questions were answered to the patients satisfaction.      [] Stable [] Improved [] Worse [] Initial visit []  Revisit [x]  Healed    [x] Adjustments made to regimen of

## 2025-06-02 NOTE — PATIENT INSTRUCTIONS
PHYSICIAN ORDERS AND DISCHARGE INSTRUCTIONS     Wound cleansing:               Do not scrub or use excessive force.              Wash hands with soap and water before and after dressing changes.              Prior to applying a clean dressing, cleanse wound with normal saline,               wound cleanser, or mild soap and water.               Ask the physician or nurse before getting the wound(s) wet in a shower                          Wound Care Notes:                               Orders for this week:  2025             Fax to [employer]          Right  Ischium - Wash with soap and water, pat dry.   Apply Desitin to wound bed and ginger wound  Cover with Sacral Silicone [place] Border   Change Wednesday and Friday     Turn and reposition every 2 hours.     Dispense 30 day quantity when ordering supplies.  Supplies ordered 25        Follow Up Instructions: At the Wound Care Center : Discharged   Primary Wound Care Provider: Nancy Delarosa CNP or Lynnette CNP  Call  for any questions or concerns.  Central [phone] for imaging and lab work

## 2025-07-16 ENCOUNTER — HOSPITAL ENCOUNTER (OUTPATIENT)
Age: 35
Setting detail: SPECIMEN
Discharge: HOME | End: 2025-07-16
Payer: COMMERCIAL

## 2025-07-16 LAB
ALBUMIN SERPL-MCNC: 4.1 G/DL (ref 3.4–5)
ALBUMIN/GLOB SERPL: 1.3 {RATIO} (ref 1.1–2.2)
ALP SERPL-CCNC: 101 U/L (ref 40–129)
ALT SERPL-CCNC: 61 U/L (ref 10–40)
ANION GAP SERPL CALCULATED.3IONS-SCNC: 17 MMOL/L (ref 9–17)
AST SERPL-CCNC: 40 U/L (ref 15–37)
BILIRUB SERPL-MCNC: 0.4 MG/DL (ref 0–1)
BUN SERPL-MCNC: 19 MG/DL (ref 7–20)
CALCIUM SERPL-MCNC: 9.6 MG/DL (ref 8.3–10.6)
CHLORIDE SERPL-SCNC: 103 MMOL/L (ref 99–110)
CO2 SERPL-SCNC: 21 MMOL/L (ref 21–32)
CREAT SERPL-MCNC: 0.6 MG/DL (ref 0.6–1.1)
FSH SERPL-ACNC: 68.5 MIU/ML
GFR, ESTIMATED: >90 ML/MIN/1.73M2
GLUCOSE SERPL-MCNC: 114 MG/DL (ref 74–99)
HCV AB SERPL QL IA: NONREACTIVE
LH SERPL-ACNC: 47.3 MIU/ML
POTASSIUM SERPL-SCNC: 4 MMOL/L (ref 3.5–5.1)
PROT SERPL-MCNC: 7.3 G/DL (ref 6.4–8.2)
SODIUM SERPL-SCNC: 141 MMOL/L (ref 136–145)

## 2025-07-16 PROCEDURE — 82670 ASSAY OF TOTAL ESTRADIOL: CPT

## 2025-07-16 PROCEDURE — 83002 ASSAY OF GONADOTROPIN (LH): CPT

## 2025-07-16 PROCEDURE — 86803 HEPATITIS C AB TEST: CPT

## 2025-07-16 PROCEDURE — 80061 LIPID PANEL: CPT

## 2025-07-16 PROCEDURE — 83001 ASSAY OF GONADOTROPIN (FSH): CPT

## 2025-07-16 PROCEDURE — 80053 COMPREHEN METABOLIC PANEL: CPT

## 2025-07-17 LAB
CHOLEST SERPL-MCNC: 199 MG/DL (ref 125–199)
ESTRADIOL LEVEL: 15 PG/ML
HDLC SERPL-MCNC: 30 MG/DL
LDLC SERPL CALC-MCNC: 112 MG/DL
TRIGL SERPL-MCNC: 283 MG/DL